# Patient Record
Sex: MALE | Race: WHITE | NOT HISPANIC OR LATINO | Employment: OTHER | URBAN - METROPOLITAN AREA
[De-identification: names, ages, dates, MRNs, and addresses within clinical notes are randomized per-mention and may not be internally consistent; named-entity substitution may affect disease eponyms.]

---

## 2017-08-18 ENCOUNTER — HOSPITAL ENCOUNTER (OUTPATIENT)
Dept: CARDIOLOGY | Facility: HOSPITAL | Age: 57
Discharge: HOME | End: 2017-08-18
Attending: PODIATRIST | Admitting: PODIATRIST

## 2019-07-08 PROBLEM — R07.9 CHEST PAIN: Status: ACTIVE | Noted: 2017-02-20

## 2019-07-08 PROBLEM — F31.9 BIPOLAR AFFECTIVE DISORDER (HCC): Status: ACTIVE | Noted: 2019-07-08

## 2019-07-08 RX ORDER — ROPINIROLE 0.5 MG/1
TABLET, FILM COATED ORAL
Status: ON HOLD | COMMUNITY
Start: 2015-09-23 | End: 2020-07-14

## 2019-07-08 RX ORDER — LORATADINE 10 MG/1
TABLET ORAL
Status: ON HOLD | COMMUNITY
Start: 2015-09-23 | End: 2020-07-14

## 2019-07-08 RX ORDER — ESCITALOPRAM OXALATE 10 MG/1
TABLET ORAL
Status: ON HOLD | COMMUNITY
Start: 2017-02-20 | End: 2020-07-14

## 2019-07-08 RX ORDER — HYDROXYZINE HYDROCHLORIDE 25 MG/1
TABLET, FILM COATED ORAL
Status: ON HOLD | COMMUNITY
Start: 2016-12-01 | End: 2020-07-14

## 2019-07-08 RX ORDER — PREGABALIN 50 MG/1
150 CAPSULE ORAL 3 TIMES DAILY
COMMUNITY
Start: 2016-05-04 | End: 2021-03-01 | Stop reason: SDUPTHER

## 2019-07-08 RX ORDER — GABAPENTIN 600 MG/1
TABLET ORAL
Status: ON HOLD | COMMUNITY
Start: 2015-07-15 | End: 2020-07-14

## 2019-07-08 RX ORDER — CLOPIDOGREL BISULFATE 75 MG/1
75 TABLET ORAL DAILY
COMMUNITY
Start: 2015-09-23

## 2019-07-08 RX ORDER — PANTOPRAZOLE SODIUM 40 MG/1
40 TABLET, DELAYED RELEASE ORAL DAILY
COMMUNITY
Start: 2017-02-20

## 2019-07-08 RX ORDER — ZOLPIDEM TARTRATE 10 MG/1
TABLET ORAL
Status: ON HOLD | COMMUNITY
Start: 2016-12-01 | End: 2020-07-14

## 2019-07-08 RX ORDER — HYDROCODONE BITARTRATE AND ACETAMINOPHEN 10; 325 MG/1; MG/1
1 TABLET ORAL EVERY 6 HOURS PRN
Status: ON HOLD | COMMUNITY
Start: 2016-05-04 | End: 2020-07-17 | Stop reason: SDUPTHER

## 2019-07-08 RX ORDER — CLONIDINE HYDROCHLORIDE 0.2 MG/1
0.1 TABLET ORAL 3 TIMES DAILY PRN
Status: ON HOLD | COMMUNITY
Start: 2015-07-15 | End: 2022-01-28

## 2019-07-08 RX ORDER — ASPIRIN 81 MG/1
TABLET ORAL
Status: ON HOLD | COMMUNITY
Start: 2017-07-19 | End: 2020-07-14

## 2019-07-08 RX ORDER — ATORVASTATIN CALCIUM 80 MG/1
80 TABLET, FILM COATED ORAL NIGHTLY
COMMUNITY
Start: 2017-07-19

## 2019-07-08 RX ORDER — LISINOPRIL 10 MG/1
TABLET ORAL
Status: ON HOLD | COMMUNITY
Start: 2015-07-15 | End: 2020-07-14

## 2019-07-08 RX ORDER — NITROGLYCERIN 400 UG/1
SPRAY ORAL
Status: ON HOLD | COMMUNITY
Start: 2018-03-14 | End: 2022-01-28

## 2019-07-08 RX ORDER — FENOFIBRATE 145 MG/1
145 TABLET, COATED ORAL DAILY
COMMUNITY
Start: 2015-09-23

## 2019-07-08 RX ORDER — PENTOXIFYLLINE 400 MG/1
TABLET, EXTENDED RELEASE ORAL
Status: ON HOLD | COMMUNITY
Start: 2018-03-14 | End: 2020-07-14

## 2019-07-08 RX ORDER — LANOLIN ALCOHOL/MO/W.PET/CERES
CREAM (GRAM) TOPICAL
Status: ON HOLD | COMMUNITY
Start: 2015-07-15 | End: 2020-07-14

## 2019-07-08 RX ORDER — OXCARBAZEPINE 300 MG/1
150 TABLET, FILM COATED ORAL NIGHTLY
COMMUNITY
Start: 2015-09-23

## 2019-07-08 RX ORDER — DOCUSATE SODIUM 100 MG/1
100 CAPSULE, LIQUID FILLED ORAL 2 TIMES DAILY
Status: ON HOLD | COMMUNITY
Start: 2015-09-23 | End: 2022-01-28

## 2019-07-08 RX ORDER — GUAIFENESIN/DEXTROMETHORPHAN 100-10MG/5
SYRUP ORAL
Status: ON HOLD | COMMUNITY
Start: 2016-12-01 | End: 2020-07-14

## 2019-07-08 RX ORDER — CYCLOBENZAPRINE HCL 10 MG
TABLET ORAL
Status: ON HOLD | COMMUNITY
Start: 2016-12-01 | End: 2020-07-14

## 2019-07-08 RX ORDER — LEVOCETIRIZINE DIHYDROCHLORIDE 5 MG/1
TABLET, FILM COATED ORAL
Status: ON HOLD | COMMUNITY
Start: 2018-03-14 | End: 2020-07-14

## 2019-07-31 ENCOUNTER — OFFICE VISIT (OUTPATIENT)
Dept: CARDIOLOGY | Facility: CLINIC | Age: 59
End: 2019-07-31

## 2019-07-31 VITALS
HEART RATE: 53 BPM | BODY MASS INDEX: 28.09 KG/M2 | HEIGHT: 67 IN | OXYGEN SATURATION: 90 % | SYSTOLIC BLOOD PRESSURE: 107 MMHG | DIASTOLIC BLOOD PRESSURE: 67 MMHG | WEIGHT: 179 LBS

## 2019-07-31 DIAGNOSIS — I10 ESSENTIAL HYPERTENSION: ICD-10-CM

## 2019-07-31 DIAGNOSIS — E78.00 PURE HYPERCHOLESTEROLEMIA: ICD-10-CM

## 2019-07-31 DIAGNOSIS — E11.9 TYPE 2 DIABETES MELLITUS WITHOUT COMPLICATION, WITHOUT LONG-TERM CURRENT USE OF INSULIN (HCC): ICD-10-CM

## 2019-07-31 DIAGNOSIS — I25.118 CORONARY ARTERY DISEASE OF NATIVE ARTERY OF NATIVE HEART WITH STABLE ANGINA PECTORIS (HCC): Primary | ICD-10-CM

## 2019-07-31 DIAGNOSIS — I73.9 PERIPHERAL ARTERIAL DISEASE (HCC): ICD-10-CM

## 2019-07-31 PROCEDURE — 93000 ELECTROCARDIOGRAM COMPLETE: CPT | Performed by: INTERNAL MEDICINE

## 2019-07-31 PROCEDURE — 99214 OFFICE O/P EST MOD 30 MIN: CPT | Performed by: INTERNAL MEDICINE

## 2019-07-31 RX ORDER — METOPROLOL SUCCINATE 25 MG/1
25 TABLET, EXTENDED RELEASE ORAL DAILY
COMMUNITY

## 2019-07-31 RX ORDER — RISPERIDONE 0.25 MG/1
0.25 TABLET ORAL 2 TIMES DAILY
COMMUNITY

## 2019-07-31 RX ORDER — AMLODIPINE BESYLATE 5 MG/1
5 TABLET ORAL DAILY
COMMUNITY

## 2019-07-31 RX ORDER — DULOXETIN HYDROCHLORIDE 20 MG/1
20 CAPSULE, DELAYED RELEASE ORAL DAILY
COMMUNITY
End: 2022-01-29 | Stop reason: HOSPADM

## 2019-07-31 RX ORDER — TRAZODONE HYDROCHLORIDE 50 MG/1
100 TABLET ORAL NIGHTLY
COMMUNITY

## 2019-07-31 RX ORDER — LOPERAMIDE HYDROCHLORIDE 2 MG/1
2 CAPSULE ORAL EVERY 4 HOURS PRN
COMMUNITY

## 2019-07-31 RX ORDER — PREDNISONE 1 MG/1
5 TABLET ORAL DAILY
COMMUNITY
End: 2021-03-01

## 2019-07-31 RX ORDER — SODIUM CHLORIDE 1000 MG
1 TABLET, SOLUBLE MISCELLANEOUS 3 TIMES DAILY
COMMUNITY

## 2019-07-31 NOTE — PROGRESS NOTES
"    Subjective:     Encounter Date:07/31/2019      Patient ID: Emery Nesbitt is a 59 y.o. male.    Chief Complaint:  History of Present Illness 59-year-old white male with history of coronary artery disease peripheral artery disease diabetes hypertension hyperlipidemia presents to the office for follow-up.  Patient is currently stable without any symptoms of chest pain or shortness of breath at rest or exertion.  He uses a wheelchair most of the time.  No complaints of any PND orthopnea.  No palpitations dizziness syncope or swelling of the feet.  He lives in a nursing home and takes his medicines regularly.  He is on a strict cardiac diet.  He does not smoke.    The following portions of the patient's history were reviewed and updated as appropriate: allergies, current medications, past family history, past medical history, past social history, past surgical history and problem list.  Past Medical History:   Diagnosis Date   • Coronary artery disease    • Hyperlipidemia    • Hypertension      History reviewed. No pertinent surgical history.  /67   Pulse 53   Ht 170.2 cm (67\")   Wt 81.2 kg (179 lb)   SpO2 90%   BMI 28.04 kg/m²   Family History   Adopted: Yes       Current Outpatient Medications:   •  amLODIPine (NORVASC) 5 MG tablet, Take 5 mg by mouth Daily., Disp: , Rfl:   •  atorvastatin (LIPITOR) 80 MG tablet, ATORVASTATIN CALCIUM 80 MG TABS, Disp: , Rfl:   •  Chlorpheniramine-Acetaminophen (CORICIDIN) 2-325 MG tablet, Take  by mouth., Disp: , Rfl:   •  CloNIDine (CATAPRES) 0.2 MG tablet, CATAPRES 0.2 MG TABS, Disp: , Rfl:   •  clopidogrel (PLAVIX) 75 MG tablet, PLAVIX 75 MG TABS, Disp: , Rfl:   •  docusate sodium (COLACE) 100 MG capsule, COLACE 100 MG CAPS, Disp: , Rfl:   •  DULoxetine (CYMBALTA) 20 MG capsule, Take 20 mg by mouth Daily., Disp: , Rfl:   •  fenofibrate (TRICOR) 145 MG tablet, TRICOR 145 MG TABS, Disp: , Rfl:   •  HYDROcodone-acetaminophen (NORCO)  MG per tablet, NORCO  MG " TABS, Disp: , Rfl:   •  loperamide (IMODIUM) 2 MG capsule, Take 2 mg by mouth 4 (Four) Times a Day As Needed for Diarrhea., Disp: , Rfl:   •  metFORMIN (GLUCOPHAGE) 500 MG tablet, METFORMIN  MG TABS, Disp: , Rfl:   •  metoprolol succinate XL (TOPROL-XL) 25 MG 24 hr tablet, Take 25 mg by mouth Daily., Disp: , Rfl:   •  nitroglycerin (NITROLINGUAL) 0.4 MG/SPRAY spray, NITROLINGUAL 0.4 MG/SPRAY SOLN, Disp: , Rfl:   •  OXcarbazepine (TRILEPTAL) 300 MG tablet, OXCARBAZEPINE 300 MG TABS, Disp: , Rfl:   •  pantoprazole (PROTONIX) 40 MG EC tablet, PANTOPRAZOLE SODIUM 40 MG TBEC, Disp: , Rfl:   •  predniSONE (DELTASONE) 5 MG tablet, Take 5 mg by mouth Daily., Disp: , Rfl:   •  pregabalin (LYRICA) 50 MG capsule, LYRICA 50 MG CAPS, Disp: , Rfl:   •  risperiDONE (risperDAL) 0.5 MG tablet, Take 0.5 mg by mouth 2 (Two) Times a Day., Disp: , Rfl:   •  sodium chloride 1 g tablet, Take 1 g by mouth 3 (Three) Times a Day., Disp: , Rfl:   •  traZODone (DESYREL) 50 MG tablet, Take 100 mg by mouth Every Night., Disp: , Rfl:   •  aspirin (ASPIR-LOW) 81 MG EC tablet, ASPIR-LOW 81 MG TBEC, Disp: , Rfl:   •  cyclobenzaprine (FLEXERIL) 10 MG tablet, CYCLOBENZAPRINE HCL 10 MG TABS, Disp: , Rfl:   •  escitalopram (LEXAPRO) 10 MG tablet, ESCITALOPRAM OXALATE 10 MG TABS, Disp: , Rfl:   •  ferrous sulfate (FE TABS) 325 (65 FE) MG EC tablet, FE TABS 325 (65 Fe) MG TBEC, Disp: , Rfl:   •  gabapentin (NEURONTIN) 600 MG tablet, GABAPENTIN 600 MG TABS, Disp: , Rfl:   •  guaifenesin-dextromethorphan (ROBITUSSIN DM) 100-10 MG/5ML syrup, GUAIFENESIN--10 MG/5ML SYRP, Disp: , Rfl:   •  hydrOXYzine (ATARAX) 25 MG tablet, HYDROXYZINE HCL 25 MG TABS, Disp: , Rfl:   •  levocetirizine (XYZAL) 5 MG tablet, XYZAL 5 MG ORAL TABLET, Disp: , Rfl:   •  lisinopril (PRINIVIL,ZESTRIL) 10 MG tablet, LISINOPRIL 10 MG TABS, Disp: , Rfl:   •  loratadine (CLARITIN) 10 MG tablet, CLARITIN 10 MG TABS, Disp: , Rfl:   •  magnesium oxide (MAGNESIUM-OXIDE) 400 (241.3  Mg) MG tablet tablet, MAGNESIUM-OXIDE 400 (241.3 Mg) MG TABS, Disp: , Rfl:   •  pentoxifylline (TRENtal) 400 MG CR tablet, PENTOXIFYLLINE  MG CR-TABS, Disp: , Rfl:   •  Polyethylene Glycol 3350 (MIRALAX PO), MIRALAX POWD, Disp: , Rfl:   •  rOPINIRole (REQUIP) 0.5 MG tablet, REQUIP 0.5 MG TABS, Disp: , Rfl:   •  zolpidem (AMBIEN) 10 MG tablet, AMBIEN 10 MG TABS, Disp: , Rfl:   No Known Allergies  Social History     Socioeconomic History   • Marital status: Single     Spouse name: Not on file   • Number of children: Not on file   • Years of education: Not on file   • Highest education level: Not on file   Tobacco Use   • Smoking status: Former Smoker     Review of Systems   Constitution: Negative for fever and malaise/fatigue.   HENT: Negative for ear pain and nosebleeds.    Eyes: Negative for blurred vision and double vision.   Cardiovascular: Negative for chest pain, dyspnea on exertion, leg swelling and palpitations.   Respiratory: Negative for cough and shortness of breath.    Skin: Negative for rash.   Musculoskeletal: Negative for joint pain.   Gastrointestinal: Negative for abdominal pain, nausea and vomiting.   Neurological: Negative for focal weakness, headaches, light-headedness and numbness.   Psychiatric/Behavioral: Negative for depression. The patient is not nervous/anxious.    All other systems reviewed and are negative.             Objective:     Physical Exam   Constitutional: He appears well-developed and well-nourished.   HENT:   Head: Normocephalic and atraumatic.   Eyes: Conjunctivae and EOM are normal. Pupils are equal, round, and reactive to light. No scleral icterus.   Neck: Normal range of motion. Neck supple. No JVD present. Carotid bruit is not present.   Cardiovascular: Normal rate, regular rhythm, S1 normal, S2 normal, normal heart sounds and intact distal pulses. PMI is not displaced.   Pulmonary/Chest: Effort normal and breath sounds normal. He has no wheezes. He has no rales.    Abdominal: Soft. Bowel sounds are normal.   Musculoskeletal: Normal range of motion.   Neurological: He is alert. He has normal strength.   No focal deficits   Skin: Skin is warm and dry. No rash noted.   Psychiatric: He has a normal mood and affect.       ECG 12 Lead  Date/Time: 7/31/2019 10:17 AM  Performed by: Sharath Conklin MD  Authorized by: Sharath Conklin MD   Comments: Sinus rhythm with nonspecific ST segment normality  No previous EKGs to compare with            Lab Review:       Assessment:          Diagnosis Plan   1. Coronary artery disease of native artery of native heart with stable angina pectoris (CMS/[facility])     2. Essential hypertension     3. Pure hypercholesterolemia     4. Type 2 diabetes mellitus without complication, without long-term current use of insulin (CMS/[facility])     5. Peripheral arterial disease (CMS/[facility])            Plan:       Patient has nonobstructive arteries now but had a stent placement the past and is currently stable on medications per  Patient's sugar levels and lipid levels are followed by the primary care doctor.  Patient's blood pressure and heart rate are stable now.  Patient has significant diabetic neuropathy and is followed by a endocrinologist.  Patient has peripheral vascular disease and used to have significant claudication but is not ambulating very well at this time.  He is followed by vascular surgeon.  I will continue him on current medicines and follow in 6 months.

## 2020-05-14 ENCOUNTER — OFFICE VISIT (OUTPATIENT)
Dept: CARDIOLOGY | Facility: CLINIC | Age: 60
End: 2020-05-14

## 2020-05-14 VITALS — DIASTOLIC BLOOD PRESSURE: 68 MMHG | HEART RATE: 76 BPM | OXYGEN SATURATION: 96 % | SYSTOLIC BLOOD PRESSURE: 116 MMHG

## 2020-05-14 DIAGNOSIS — I25.118 CORONARY ARTERY DISEASE OF NATIVE ARTERY OF NATIVE HEART WITH STABLE ANGINA PECTORIS (HCC): ICD-10-CM

## 2020-05-14 DIAGNOSIS — I10 ESSENTIAL HYPERTENSION: ICD-10-CM

## 2020-05-14 DIAGNOSIS — E78.00 PURE HYPERCHOLESTEROLEMIA: ICD-10-CM

## 2020-05-14 DIAGNOSIS — I73.9 PERIPHERAL VASCULAR DISEASE (HCC): Primary | ICD-10-CM

## 2020-05-14 DIAGNOSIS — E11.9 TYPE 2 DIABETES MELLITUS WITHOUT COMPLICATION, WITHOUT LONG-TERM CURRENT USE OF INSULIN (HCC): ICD-10-CM

## 2020-05-14 PROCEDURE — 99214 OFFICE O/P EST MOD 30 MIN: CPT | Performed by: INTERNAL MEDICINE

## 2020-05-14 RX ORDER — HYDRALAZINE HYDROCHLORIDE 50 MG/1
50 TABLET, FILM COATED ORAL 3 TIMES DAILY
COMMUNITY
Start: 2020-05-04

## 2020-05-14 RX ORDER — ACETAMINOPHEN 325 MG/1
650 TABLET ORAL EVERY 6 HOURS PRN
COMMUNITY
Start: 2020-05-05 | End: 2021-03-01

## 2020-05-14 NOTE — PROGRESS NOTES
Subjective:     Encounter Date:2020      Patient ID: Emery Nesbitt is a 60 y.o. male.    Chief Complaint:  History of Present Illness 60-year-old white male with history of coronary artery disease history of peripheral vascular disease history of diabetes hypertension hyperlipidemia presents to my office for follow-up.  Patient is currently stable without evidence of chest pain or shortness of breath at rest on exertion.  No complains of any PND orthopnea.  No palpitation dizziness syncope or swelling of the feet.  He does not walk very well because of claudication and uses wheelchair.  He follows a good diet.  He is currently at a nursing home facility.  He is taking his medicines regularly.      .  /68   Pulse 76   SpO2 96%     The following portions of the patient's history were reviewed and updated as appropriate: allergies, current medications, past family history, past medical history, past social history, past surgical history and problem list.  Past Medical History:   Diagnosis Date   • Coronary artery disease    • Diabetes mellitus (CMS/HCC)    • Hyperlipidemia    • Hypertension    • Peripheral vascular disease (CMS/[facility])      Past Surgical History:   Procedure Laterality Date   • CARDIAC CATHETERIZATION     • CORONARY ANGIOPLASTY       Social History     Socioeconomic History   • Marital status: Single     Spouse name: Not on file   • Number of children: Not on file   • Years of education: Not on file   • Highest education level: Not on file   Tobacco Use   • Smoking status: Former Smoker     Last attempt to quit: 2016     Years since quittin.3   • Smokeless tobacco: Never Used   Substance and Sexual Activity   • Alcohol use: Never     Frequency: Never   • Drug use: Never     Family History   Adopted: Yes       Current Outpatient Medications:   •  amLODIPine (NORVASC) 5 MG tablet, Take 5 mg by mouth Daily., Disp: , Rfl:   •  atorvastatin (LIPITOR) 80 MG tablet, ATORVASTATIN CALCIUM 80  MG TABS, Disp: , Rfl:   •  CloNIDine (CATAPRES) 0.2 MG tablet, Take 0.1 mg by mouth 3 (Three) Times a Day As Needed., Disp: , Rfl:   •  clopidogrel (PLAVIX) 75 MG tablet, PLAVIX 75 MG TABS, Disp: , Rfl:   •  docusate sodium (COLACE) 100 MG capsule, COLACE 100 MG CAPS, Disp: , Rfl:   •  DULoxetine (CYMBALTA) 20 MG capsule, Take 20 mg by mouth Daily., Disp: , Rfl:   •  fenofibrate (TRICOR) 145 MG tablet, TRICOR 145 MG TABS, Disp: , Rfl:   •  HYDROcodone-acetaminophen (NORCO)  MG per tablet, NORCO  MG TABS, Disp: , Rfl:   •  levocetirizine (XYZAL) 5 MG tablet, XYZAL 5 MG ORAL TABLET, Disp: , Rfl:   •  magnesium oxide (MAGNESIUM-OXIDE) 400 (241.3 Mg) MG tablet tablet, MAGNESIUM-OXIDE 400 (241.3 Mg) MG TABS, Disp: , Rfl:   •  metFORMIN (GLUCOPHAGE) 500 MG tablet, METFORMIN  MG TABS, Disp: , Rfl:   •  metoprolol succinate XL (TOPROL-XL) 25 MG 24 hr tablet, Take 25 mg by mouth Daily., Disp: , Rfl:   •  nitroglycerin (NITROLINGUAL) 0.4 MG/SPRAY spray, NITROLINGUAL 0.4 MG/SPRAY SOLN, Disp: , Rfl:   •  OXcarbazepine (TRILEPTAL) 300 MG tablet, OXCARBAZEPINE 300 MG TABS, Disp: , Rfl:   •  pantoprazole (PROTONIX) 40 MG EC tablet, PANTOPRAZOLE SODIUM 40 MG TBEC, Disp: , Rfl:   •  Polyethylene Glycol 3350 (MIRALAX PO), MIRALAX POWD, Disp: , Rfl:   •  predniSONE (DELTASONE) 5 MG tablet, Take 5 mg by mouth Daily., Disp: , Rfl:   •  pregabalin (LYRICA) 50 MG capsule, 3 (Three) Times a Day., Disp: , Rfl:   •  risperiDONE (risperDAL) 0.5 MG tablet, Take 0.5 mg by mouth 2 (Two) Times a Day., Disp: , Rfl:   •  traZODone (DESYREL) 50 MG tablet, Take 100 mg by mouth Every Night., Disp: , Rfl:   •  acetaminophen (TYLENOL) 325 MG tablet, , Disp: , Rfl:   •  aspirin (ASPIR-LOW) 81 MG EC tablet, ASPIR-LOW 81 MG TBEC, Disp: , Rfl:   •  Chlorpheniramine-Acetaminophen (CORICIDIN) 2-325 MG tablet, Take  by mouth., Disp: , Rfl:   •  cyclobenzaprine (FLEXERIL) 10 MG tablet, CYCLOBENZAPRINE HCL 10 MG TABS, Disp: , Rfl:   •   escitalopram (LEXAPRO) 10 MG tablet, ESCITALOPRAM OXALATE 10 MG TABS, Disp: , Rfl:   •  ferrous sulfate (FE TABS) 325 (65 FE) MG EC tablet, FE TABS 325 (65 Fe) MG TBEC, Disp: , Rfl:   •  gabapentin (NEURONTIN) 600 MG tablet, GABAPENTIN 600 MG TABS, Disp: , Rfl:   •  guaifenesin-dextromethorphan (ROBITUSSIN DM) 100-10 MG/5ML syrup, GUAIFENESIN--10 MG/5ML SYRP, Disp: , Rfl:   •  hydrALAZINE (APRESOLINE) 50 MG tablet, 3 (Three) Times a Day., Disp: , Rfl:   •  hydrOXYzine (ATARAX) 25 MG tablet, HYDROXYZINE HCL 25 MG TABS, Disp: , Rfl:   •  lisinopril (PRINIVIL,ZESTRIL) 10 MG tablet, LISINOPRIL 10 MG TABS, Disp: , Rfl:   •  loperamide (IMODIUM) 2 MG capsule, Take 2 mg by mouth 4 (Four) Times a Day As Needed for Diarrhea., Disp: , Rfl:   •  loratadine (CLARITIN) 10 MG tablet, CLARITIN 10 MG TABS, Disp: , Rfl:   •  pentoxifylline (TRENtal) 400 MG CR tablet, PENTOXIFYLLINE  MG CR-TABS, Disp: , Rfl:   •  rOPINIRole (REQUIP) 0.5 MG tablet, REQUIP 0.5 MG TABS, Disp: , Rfl:   •  sodium chloride 1 g tablet, Take 1 g by mouth 3 (Three) Times a Day., Disp: , Rfl:   •  zolpidem (AMBIEN) 10 MG tablet, AMBIEN 10 MG TABS, Disp: , Rfl:   No Known Allergies    Review of Systems   Constitution: Negative for fever and malaise/fatigue.   HENT: Negative for congestion and hearing loss.    Eyes: Negative for double vision and visual disturbance.   Cardiovascular: Negative for chest pain, claudication, dyspnea on exertion, leg swelling, palpitations and syncope.   Respiratory: Negative for cough and shortness of breath.    Endocrine: Negative for cold intolerance.   Skin: Negative for color change and rash.   Musculoskeletal: Negative for arthritis and joint pain.   Gastrointestinal: Negative for abdominal pain and heartburn.   Genitourinary: Negative for hematuria.   Neurological: Negative for excessive daytime sleepiness and dizziness.   Psychiatric/Behavioral: Negative for depression. The patient is not nervous/anxious.    All  other systems reviewed and are negative.             Objective:     Physical Exam   Constitutional: He appears well-developed and well-nourished.   HENT:   Head: Normocephalic and atraumatic.   Eyes: Pupils are equal, round, and reactive to light. Conjunctivae and EOM are normal. No scleral icterus.   Neck: Normal range of motion. Neck supple. No JVD present. Carotid bruit is not present.   Cardiovascular: Normal rate, regular rhythm, S1 normal, S2 normal and intact distal pulses. PMI is not displaced.   Murmur heard.  Pulmonary/Chest: Effort normal and breath sounds normal. He has no wheezes. He has no rales.   Abdominal: Soft. Bowel sounds are normal.   Musculoskeletal: Normal range of motion.   Neurological: He is alert. He has normal strength.   No focal deficits   Skin: Skin is warm and dry. No rash noted.   Psychiatric: He has a normal mood and affect.       Procedures    Lab Review:       Assessment:          Diagnosis Plan   1. Peripheral vascular disease (CMS/[facility])     2. Essential hypertension     3. Pure hypercholesterolemia     4. Coronary artery disease of native artery of native heart with stable angina pectoris (CMS/[facility])     5. Type 2 diabetes mellitus without complication, without long-term current use of insulin (CMS/[facility])            Plan:     Denies history of coronary disease and is currently stable on medical therapy without any symptoms  Patient has history of peripheral vascular disease and has claudication and does not ambulate very well  Patient blood pressure and heart rate are stable  Patient's lipid levels are followed by the primary care doctor  Patient has diabetes and is on medical therapy  Continue current medicines and follow him in 6 months

## 2020-07-14 ENCOUNTER — APPOINTMENT (OUTPATIENT)
Dept: CARDIOLOGY | Facility: HOSPITAL | Age: 60
End: 2020-07-14

## 2020-07-14 ENCOUNTER — APPOINTMENT (OUTPATIENT)
Dept: CT IMAGING | Facility: HOSPITAL | Age: 60
End: 2020-07-14

## 2020-07-14 ENCOUNTER — HOSPITAL ENCOUNTER (INPATIENT)
Facility: HOSPITAL | Age: 60
LOS: 3 days | Discharge: NURSING, REHAB OR LONG-TERM CARE | End: 2020-07-17
Attending: INTERNAL MEDICINE | Admitting: PODIATRIST

## 2020-07-14 ENCOUNTER — APPOINTMENT (OUTPATIENT)
Dept: VASCULAR SURGERY | Facility: HOSPITAL | Age: 60
End: 2020-07-14

## 2020-07-14 ENCOUNTER — APPOINTMENT (OUTPATIENT)
Dept: GENERAL RADIOLOGY | Facility: HOSPITAL | Age: 60
End: 2020-07-14

## 2020-07-14 ENCOUNTER — APPOINTMENT (OUTPATIENT)
Dept: MRI IMAGING | Facility: HOSPITAL | Age: 60
End: 2020-07-14

## 2020-07-14 DIAGNOSIS — E11.621 DIABETIC ULCER OF TOE OF RIGHT FOOT ASSOCIATED WITH TYPE 2 DIABETES MELLITUS, WITH BONE INVOLVEMENT WITHOUT EVIDENCE OF NECROSIS (HCC): ICD-10-CM

## 2020-07-14 DIAGNOSIS — I73.9 PERIPHERAL VASCULAR DISEASE (HCC): Primary | ICD-10-CM

## 2020-07-14 DIAGNOSIS — L97.516 DIABETIC ULCER OF TOE OF RIGHT FOOT ASSOCIATED WITH TYPE 2 DIABETES MELLITUS, WITH BONE INVOLVEMENT WITHOUT EVIDENCE OF NECROSIS (HCC): ICD-10-CM

## 2020-07-14 PROBLEM — F31.9 BIPOLAR AFFECTIVE DISORDER (HCC): Chronic | Status: ACTIVE | Noted: 2019-07-08

## 2020-07-14 PROBLEM — G25.81 RLS (RESTLESS LEGS SYNDROME): Chronic | Status: ACTIVE | Noted: 2020-07-14

## 2020-07-14 PROBLEM — Z86.73 HISTORY OF CVA (CEREBROVASCULAR ACCIDENT): Chronic | Status: ACTIVE | Noted: 2020-07-14

## 2020-07-14 PROBLEM — E83.42 HYPOMAGNESEMIA: Chronic | Status: ACTIVE | Noted: 2020-07-14

## 2020-07-14 PROBLEM — G62.9 PERIPHERAL NEUROPATHY: Chronic | Status: ACTIVE | Noted: 2020-07-14

## 2020-07-14 PROBLEM — E87.1 HYPONATREMIA: Chronic | Status: ACTIVE | Noted: 2020-07-14

## 2020-07-14 PROBLEM — F41.9 ANXIETY DISORDER: Chronic | Status: ACTIVE | Noted: 2020-07-14

## 2020-07-14 PROBLEM — J30.2 SEASONAL ALLERGIES: Chronic | Status: ACTIVE | Noted: 2020-07-14

## 2020-07-14 PROBLEM — L03.031 CELLULITIS OF RIGHT TOE: Status: ACTIVE | Noted: 2020-07-14

## 2020-07-14 LAB
ALBUMIN SERPL-MCNC: 4 G/DL (ref 3.5–5.2)
ALBUMIN/GLOB SERPL: 2.1 G/DL
ALP SERPL-CCNC: 43 U/L (ref 39–117)
ALT SERPL W P-5'-P-CCNC: 21 U/L (ref 1–41)
ANION GAP SERPL CALCULATED.3IONS-SCNC: 10 MMOL/L (ref 5–15)
AST SERPL-CCNC: 19 U/L (ref 1–40)
BASOPHILS # BLD AUTO: 0 10*3/MM3 (ref 0–0.2)
BASOPHILS NFR BLD AUTO: 0.5 % (ref 0–1.5)
BH CV LOWER ARTERIAL LEFT ABI RATIO: 0.63
BH CV LOWER ARTERIAL LEFT DORSALIS PEDIS SYS MAX: 87 MMHG
BH CV LOWER ARTERIAL LEFT GREAT TOE SYS MAX: 75 MMHG
BH CV LOWER ARTERIAL LEFT POST TIBIAL SYS MAX: 95 MMHG
BH CV LOWER ARTERIAL LEFT TBI RATIO: 0.5
BH CV LOWER ARTERIAL RIGHT ABI RATIO: 0.73
BH CV LOWER ARTERIAL RIGHT DORSALIS PEDIS SYS MAX: 76 MMHG
BH CV LOWER ARTERIAL RIGHT GREAT TOE SYS MAX: 71 MMHG
BH CV LOWER ARTERIAL RIGHT POST TIBIAL SYS MAX: 110 MMHG
BH CV LOWER ARTERIAL RIGHT TBI RATIO: 0.47
BILIRUB SERPL-MCNC: 0.2 MG/DL (ref 0–1.2)
BUN SERPL-MCNC: 16 MG/DL (ref 8–23)
BUN SERPL-MCNC: ABNORMAL MG/DL
BUN/CREAT SERPL: ABNORMAL
CALCIUM SPEC-SCNC: 9.2 MG/DL (ref 8.6–10.5)
CHLORIDE SERPL-SCNC: 95 MMOL/L (ref 98–107)
CO2 SERPL-SCNC: 26 MMOL/L (ref 22–29)
CREAT SERPL-MCNC: 0.73 MG/DL (ref 0.76–1.27)
CRP SERPL-MCNC: 0.69 MG/DL (ref 0–0.5)
D-LACTATE SERPL-SCNC: 0.7 MMOL/L (ref 0.5–2)
DEPRECATED RDW RBC AUTO: 50.3 FL (ref 37–54)
EOSINOPHIL # BLD AUTO: 0.1 10*3/MM3 (ref 0–0.4)
EOSINOPHIL NFR BLD AUTO: 1.2 % (ref 0.3–6.2)
ERYTHROCYTE [DISTWIDTH] IN BLOOD BY AUTOMATED COUNT: 16.7 % (ref 12.3–15.4)
ERYTHROCYTE [SEDIMENTATION RATE] IN BLOOD: 10 MM/HR (ref 0–20)
GFR SERPL CREATININE-BSD FRML MDRD: 109 ML/MIN/1.73
GLOBULIN UR ELPH-MCNC: 1.9 GM/DL
GLUCOSE SERPL-MCNC: 80 MG/DL (ref 65–99)
HBA1C MFR BLD: 5.4 % (ref 3.5–5.6)
HCT VFR BLD AUTO: 31.8 % (ref 37.5–51)
HGB BLD-MCNC: 11 G/DL (ref 13–17.7)
LYMPHOCYTES # BLD AUTO: 1.6 10*3/MM3 (ref 0.7–3.1)
LYMPHOCYTES NFR BLD AUTO: 22 % (ref 19.6–45.3)
MAGNESIUM SERPL-MCNC: 1.2 MG/DL (ref 1.6–2.4)
MCH RBC QN AUTO: 29.6 PG (ref 26.6–33)
MCHC RBC AUTO-ENTMCNC: 34.7 G/DL (ref 31.5–35.7)
MCV RBC AUTO: 85.3 FL (ref 79–97)
MONOCYTES # BLD AUTO: 0.8 10*3/MM3 (ref 0.1–0.9)
MONOCYTES NFR BLD AUTO: 10.6 % (ref 5–12)
NEUTROPHILS NFR BLD AUTO: 4.9 10*3/MM3 (ref 1.7–7)
NEUTROPHILS NFR BLD AUTO: 65.7 % (ref 42.7–76)
NRBC BLD AUTO-RTO: 0 /100 WBC (ref 0–0.2)
PLATELET # BLD AUTO: 157 10*3/MM3 (ref 140–450)
PMV BLD AUTO: 9.2 FL (ref 6–12)
POTASSIUM SERPL-SCNC: 4 MMOL/L (ref 3.5–5.2)
PROT SERPL-MCNC: 5.9 G/DL (ref 6–8.5)
RBC # BLD AUTO: 3.72 10*6/MM3 (ref 4.14–5.8)
SODIUM SERPL-SCNC: 131 MMOL/L (ref 136–145)
UPPER ARTERIAL LEFT ARM BRACHIAL SYS MAX: 150 MMHG
UPPER ARTERIAL RIGHT ARM BRACHIAL SYS MAX: 140 MMHG
WBC # BLD AUTO: 7.5 10*3/MM3 (ref 3.4–10.8)

## 2020-07-14 PROCEDURE — G0463 HOSPITAL OUTPT CLINIC VISIT: HCPCS

## 2020-07-14 PROCEDURE — 83735 ASSAY OF MAGNESIUM: CPT | Performed by: NURSE PRACTITIONER

## 2020-07-14 PROCEDURE — A9579 GAD-BASE MR CONTRAST NOS,1ML: HCPCS | Performed by: INTERNAL MEDICINE

## 2020-07-14 PROCEDURE — 0 IOPAMIDOL PER 1 ML: Performed by: INTERNAL MEDICINE

## 2020-07-14 PROCEDURE — 75635 CT ANGIO ABDOMINAL ARTERIES: CPT

## 2020-07-14 PROCEDURE — 86140 C-REACTIVE PROTEIN: CPT | Performed by: INTERNAL MEDICINE

## 2020-07-14 PROCEDURE — 25010000002 VANCOMYCIN 10 G RECONSTITUTED SOLUTION: Performed by: NURSE PRACTITIONER

## 2020-07-14 PROCEDURE — 83605 ASSAY OF LACTIC ACID: CPT | Performed by: INTERNAL MEDICINE

## 2020-07-14 PROCEDURE — 93005 ELECTROCARDIOGRAM TRACING: CPT | Performed by: INTERNAL MEDICINE

## 2020-07-14 PROCEDURE — 80053 COMPREHEN METABOLIC PANEL: CPT | Performed by: INTERNAL MEDICINE

## 2020-07-14 PROCEDURE — 25010000002 GADOTERIDOL PER 1 ML: Performed by: INTERNAL MEDICINE

## 2020-07-14 PROCEDURE — 99223 1ST HOSP IP/OBS HIGH 75: CPT | Performed by: INTERNAL MEDICINE

## 2020-07-14 PROCEDURE — 83036 HEMOGLOBIN GLYCOSYLATED A1C: CPT | Performed by: NURSE PRACTITIONER

## 2020-07-14 PROCEDURE — 87205 SMEAR GRAM STAIN: CPT | Performed by: NURSE PRACTITIONER

## 2020-07-14 PROCEDURE — 93922 UPR/L XTREMITY ART 2 LEVELS: CPT

## 2020-07-14 PROCEDURE — 71045 X-RAY EXAM CHEST 1 VIEW: CPT

## 2020-07-14 PROCEDURE — 87076 CULTURE ANAEROBE IDENT EACH: CPT | Performed by: NURSE PRACTITIONER

## 2020-07-14 PROCEDURE — 87070 CULTURE OTHR SPECIMN AEROBIC: CPT | Performed by: NURSE PRACTITIONER

## 2020-07-14 PROCEDURE — 85652 RBC SED RATE AUTOMATED: CPT | Performed by: INTERNAL MEDICINE

## 2020-07-14 PROCEDURE — 25010000002 CEFEPIME PER 500 MG: Performed by: NURSE PRACTITIONER

## 2020-07-14 PROCEDURE — 85025 COMPLETE CBC W/AUTO DIFF WBC: CPT | Performed by: INTERNAL MEDICINE

## 2020-07-14 PROCEDURE — 73720 MRI LWR EXTREMITY W/O&W/DYE: CPT

## 2020-07-14 RX ORDER — OXCARBAZEPINE 150 MG/1
300 TABLET, FILM COATED ORAL
Status: DISCONTINUED | OUTPATIENT
Start: 2020-07-14 | End: 2020-07-17 | Stop reason: HOSPADM

## 2020-07-14 RX ORDER — HYDRALAZINE HYDROCHLORIDE 25 MG/1
50 TABLET, FILM COATED ORAL 3 TIMES DAILY
Status: DISCONTINUED | OUTPATIENT
Start: 2020-07-14 | End: 2020-07-17 | Stop reason: HOSPADM

## 2020-07-14 RX ORDER — VANCOMYCIN 1.75 GRAM/500 ML IN 0.9 % SODIUM CHLORIDE INTRAVENOUS
1750 ONCE
Status: COMPLETED | OUTPATIENT
Start: 2020-07-14 | End: 2020-07-14

## 2020-07-14 RX ORDER — OXCARBAZEPINE 150 MG/1
150 TABLET, FILM COATED ORAL NIGHTLY
Status: DISCONTINUED | OUTPATIENT
Start: 2020-07-14 | End: 2020-07-17 | Stop reason: HOSPADM

## 2020-07-14 RX ORDER — RISPERIDONE 0.25 MG/1
0.5 TABLET ORAL 2 TIMES DAILY
Status: DISCONTINUED | OUTPATIENT
Start: 2020-07-14 | End: 2020-07-17 | Stop reason: HOSPADM

## 2020-07-14 RX ORDER — CLOPIDOGREL BISULFATE 75 MG/1
75 TABLET ORAL DAILY
Status: DISCONTINUED | OUTPATIENT
Start: 2020-07-15 | End: 2020-07-17 | Stop reason: HOSPADM

## 2020-07-14 RX ORDER — PROMETHAZINE HYDROCHLORIDE 12.5 MG/1
25 TABLET ORAL EVERY 6 HOURS PRN
COMMUNITY

## 2020-07-14 RX ORDER — CETIRIZINE HYDROCHLORIDE 10 MG/1
10 TABLET ORAL NIGHTLY
Status: DISCONTINUED | OUTPATIENT
Start: 2020-07-14 | End: 2020-07-17 | Stop reason: HOSPADM

## 2020-07-14 RX ORDER — HYDROCODONE BITARTRATE AND ACETAMINOPHEN 10; 325 MG/1; MG/1
1 TABLET ORAL EVERY 6 HOURS PRN
Status: DISCONTINUED | OUTPATIENT
Start: 2020-07-14 | End: 2020-07-17 | Stop reason: HOSPADM

## 2020-07-14 RX ORDER — METOPROLOL SUCCINATE 25 MG/1
25 TABLET, EXTENDED RELEASE ORAL DAILY
Status: DISCONTINUED | OUTPATIENT
Start: 2020-07-15 | End: 2020-07-17 | Stop reason: HOSPADM

## 2020-07-14 RX ORDER — MULTIVITAMIN,THERAPEUTIC
1 TABLET ORAL DAILY
Status: DISCONTINUED | OUTPATIENT
Start: 2020-07-14 | End: 2020-07-17 | Stop reason: HOSPADM

## 2020-07-14 RX ORDER — CETIRIZINE HYDROCHLORIDE 10 MG/1
10 TABLET ORAL NIGHTLY
COMMUNITY

## 2020-07-14 RX ORDER — SODIUM CHLORIDE 1000 MG
1 TABLET, SOLUBLE MISCELLANEOUS 2 TIMES DAILY
Status: DISCONTINUED | OUTPATIENT
Start: 2020-07-14 | End: 2020-07-17 | Stop reason: HOSPADM

## 2020-07-14 RX ORDER — MAGNESIUM SULFATE 1 G/100ML
1 INJECTION INTRAVENOUS AS NEEDED
Status: DISCONTINUED | OUTPATIENT
Start: 2020-07-14 | End: 2020-07-17 | Stop reason: HOSPADM

## 2020-07-14 RX ORDER — ONDANSETRON 4 MG/1
4 TABLET, FILM COATED ORAL EVERY 6 HOURS PRN
Status: DISCONTINUED | OUTPATIENT
Start: 2020-07-14 | End: 2020-07-17 | Stop reason: HOSPADM

## 2020-07-14 RX ORDER — OXCARBAZEPINE 300 MG/1
300 TABLET, FILM COATED ORAL 3 TIMES DAILY
COMMUNITY

## 2020-07-14 RX ORDER — ACETAMINOPHEN 325 MG/1
650 TABLET ORAL EVERY 4 HOURS PRN
Status: DISCONTINUED | OUTPATIENT
Start: 2020-07-14 | End: 2020-07-17 | Stop reason: HOSPADM

## 2020-07-14 RX ORDER — SODIUM CHLORIDE 0.9 % (FLUSH) 0.9 %
10 SYRINGE (ML) INJECTION AS NEEDED
Status: DISCONTINUED | OUTPATIENT
Start: 2020-07-14 | End: 2020-07-17 | Stop reason: HOSPADM

## 2020-07-14 RX ORDER — DICYCLOMINE HYDROCHLORIDE 10 MG/1
10 CAPSULE ORAL EVERY 6 HOURS PRN
COMMUNITY

## 2020-07-14 RX ORDER — FENOFIBRATE 145 MG/1
145 TABLET, COATED ORAL DAILY
Status: DISCONTINUED | OUTPATIENT
Start: 2020-07-15 | End: 2020-07-17 | Stop reason: HOSPADM

## 2020-07-14 RX ORDER — DOCUSATE SODIUM 100 MG/1
100 CAPSULE, LIQUID FILLED ORAL 2 TIMES DAILY
Status: DISCONTINUED | OUTPATIENT
Start: 2020-07-14 | End: 2020-07-17 | Stop reason: HOSPADM

## 2020-07-14 RX ORDER — PREDNISONE 1 MG/1
5 TABLET ORAL DAILY
Status: DISCONTINUED | OUTPATIENT
Start: 2020-07-14 | End: 2020-07-14

## 2020-07-14 RX ORDER — TRAZODONE HYDROCHLORIDE 100 MG/1
100 TABLET ORAL NIGHTLY
Status: DISCONTINUED | OUTPATIENT
Start: 2020-07-14 | End: 2020-07-17 | Stop reason: HOSPADM

## 2020-07-14 RX ORDER — AMLODIPINE BESYLATE 5 MG/1
5 TABLET ORAL DAILY
Status: DISCONTINUED | OUTPATIENT
Start: 2020-07-15 | End: 2020-07-17 | Stop reason: HOSPADM

## 2020-07-14 RX ORDER — MAGNESIUM SULFATE HEPTAHYDRATE 40 MG/ML
2 INJECTION, SOLUTION INTRAVENOUS AS NEEDED
Status: DISCONTINUED | OUTPATIENT
Start: 2020-07-14 | End: 2020-07-17 | Stop reason: HOSPADM

## 2020-07-14 RX ORDER — ACETAMINOPHEN 160 MG/5ML
650 SOLUTION ORAL EVERY 4 HOURS PRN
Status: DISCONTINUED | OUTPATIENT
Start: 2020-07-14 | End: 2020-07-17 | Stop reason: HOSPADM

## 2020-07-14 RX ORDER — SODIUM CHLORIDE 0.9 % (FLUSH) 0.9 %
10 SYRINGE (ML) INJECTION EVERY 12 HOURS SCHEDULED
Status: DISCONTINUED | OUTPATIENT
Start: 2020-07-14 | End: 2020-07-17 | Stop reason: HOSPADM

## 2020-07-14 RX ORDER — PANTOPRAZOLE SODIUM 40 MG/1
40 TABLET, DELAYED RELEASE ORAL DAILY
Status: DISCONTINUED | OUTPATIENT
Start: 2020-07-15 | End: 2020-07-17 | Stop reason: HOSPADM

## 2020-07-14 RX ORDER — ATORVASTATIN CALCIUM 40 MG/1
80 TABLET, FILM COATED ORAL NIGHTLY
Status: DISCONTINUED | OUTPATIENT
Start: 2020-07-14 | End: 2020-07-17 | Stop reason: HOSPADM

## 2020-07-14 RX ORDER — ACETAMINOPHEN 650 MG/1
650 SUPPOSITORY RECTAL EVERY 4 HOURS PRN
Status: DISCONTINUED | OUTPATIENT
Start: 2020-07-14 | End: 2020-07-17 | Stop reason: HOSPADM

## 2020-07-14 RX ORDER — PREGABALIN 75 MG/1
150 CAPSULE ORAL 3 TIMES DAILY
Status: DISCONTINUED | OUTPATIENT
Start: 2020-07-14 | End: 2020-07-17 | Stop reason: HOSPADM

## 2020-07-14 RX ORDER — HYDRALAZINE HYDROCHLORIDE 20 MG/ML
10 INJECTION INTRAMUSCULAR; INTRAVENOUS EVERY 6 HOURS PRN
Status: DISCONTINUED | OUTPATIENT
Start: 2020-07-14 | End: 2020-07-17 | Stop reason: HOSPADM

## 2020-07-14 RX ORDER — DULOXETIN HYDROCHLORIDE 20 MG/1
20 CAPSULE, DELAYED RELEASE ORAL DAILY
Status: DISCONTINUED | OUTPATIENT
Start: 2020-07-15 | End: 2020-07-17 | Stop reason: HOSPADM

## 2020-07-14 RX ORDER — ONDANSETRON 2 MG/ML
4 INJECTION INTRAMUSCULAR; INTRAVENOUS EVERY 6 HOURS PRN
Status: DISCONTINUED | OUTPATIENT
Start: 2020-07-14 | End: 2020-07-17 | Stop reason: HOSPADM

## 2020-07-14 RX ORDER — POTASSIUM CHLORIDE 20 MEQ/1
40 TABLET, EXTENDED RELEASE ORAL AS NEEDED
Status: DISCONTINUED | OUTPATIENT
Start: 2020-07-14 | End: 2020-07-17 | Stop reason: HOSPADM

## 2020-07-14 RX ORDER — MULTIPLE VITAMINS W/ MINERALS TAB 9MG-400MCG
1 TAB ORAL DAILY
Status: ON HOLD | COMMUNITY
End: 2022-01-28

## 2020-07-14 RX ADMIN — Medication 10 ML: at 21:08

## 2020-07-14 RX ADMIN — Medication 10 ML: at 17:51

## 2020-07-14 RX ADMIN — ATORVASTATIN CALCIUM 80 MG: 40 TABLET, FILM COATED ORAL at 21:06

## 2020-07-14 RX ADMIN — OXCARBAZEPINE 150 MG: 150 TABLET, FILM COATED ORAL at 21:08

## 2020-07-14 RX ADMIN — IOPAMIDOL 100 ML: 755 INJECTION, SOLUTION INTRAVENOUS at 17:30

## 2020-07-14 RX ADMIN — TRAZODONE HYDROCHLORIDE 100 MG: 100 TABLET ORAL at 21:08

## 2020-07-14 RX ADMIN — RISPERIDONE 0.5 MG: 0.25 TABLET ORAL at 21:08

## 2020-07-14 RX ADMIN — HYDRALAZINE HYDROCHLORIDE 50 MG: 25 TABLET, FILM COATED ORAL at 16:55

## 2020-07-14 RX ADMIN — GADOTERIDOL 16 ML: 279.3 INJECTION, SOLUTION INTRAVENOUS at 15:30

## 2020-07-14 RX ADMIN — HYDROCODONE BITARTRATE AND ACETAMINOPHEN 1 TABLET: 10; 325 TABLET ORAL at 21:24

## 2020-07-14 RX ADMIN — OXCARBAZEPINE 300 MG: 150 TABLET, FILM COATED ORAL at 18:13

## 2020-07-14 RX ADMIN — THERA TABS 1 TABLET: TAB at 16:55

## 2020-07-14 RX ADMIN — HYDRALAZINE HYDROCHLORIDE 50 MG: 25 TABLET, FILM COATED ORAL at 21:06

## 2020-07-14 RX ADMIN — VANCOMYCIN HYDROCHLORIDE 1750 MG: 10 INJECTION, POWDER, LYOPHILIZED, FOR SOLUTION INTRAVENOUS at 17:51

## 2020-07-14 RX ADMIN — PREGABALIN 150 MG: 75 CAPSULE ORAL at 21:06

## 2020-07-14 RX ADMIN — CEFEPIME HYDROCHLORIDE 2 G: 2 INJECTION, POWDER, FOR SOLUTION INTRAVENOUS at 16:45

## 2020-07-14 RX ADMIN — SODIUM CHLORIDE TAB 1 GM 1 G: 1 TAB at 21:08

## 2020-07-14 RX ADMIN — DOCUSATE SODIUM 100 MG: 100 CAPSULE, LIQUID FILLED ORAL at 21:06

## 2020-07-14 RX ADMIN — PREGABALIN 150 MG: 75 CAPSULE ORAL at 16:55

## 2020-07-14 RX ADMIN — CETIRIZINE HYDROCHLORIDE 10 MG: 10 TABLET, FILM COATED ORAL at 21:08

## 2020-07-14 NOTE — H&P
[facility] Medicine Services      Patient Name: Emery Nesbitt  : 1960  MRN: [id number]  Primary Care Physician: Wang Johnson MD  Date of admission: 2020    Patient Care Team:  Wang Johnson MD as PCP - General  Sharath Conklin MD as Consulting Physician (Cardiology)          Subjective   History Present Illness     Chief Complaint: Right great toe wound    Mr. Nesbitt is a 60 y.o. male with a past medical history of CAD, hypertension, hyperlipidemia, PVD status post bilateral bypass grafts, CVA, bipolar, anemia, and diabetes mellitus type 2 who was directly admitted to [facility] by vascular due to patient having a right great toe wound/cellulitis.  Hospitalist accepted the patient for further care and management.  Patient states he developed an ulcer on his right big toe 2 months ago.  He states it was due to his toe rubbing on his shoe.  He has been following up with Dr. Garcia outpatient and was given a new pair shoes.  Today he followed up with Dr. Olson and patient was found to have right great toe cellulitis.  Patient denies any pain.  He denies any recent nausea, vomiting, diarrhea, fever, chills, cough, shortness of breath, or chest pain.  He currently resides at [facility].           Review of Systems   Constitution: Negative.   HENT: Negative.    Cardiovascular: Negative.    Respiratory: Negative.    Skin:        Right great toe wound   Musculoskeletal:        Right great toe wound   Gastrointestinal: Negative.    Genitourinary: Negative.    Neurological: Negative.    Psychiatric/Behavioral: Negative.            Personal History     Past Medical History:   Past Medical History:   Diagnosis Date   • Bipolar disorder (CMS/[facility]) 2020   • Cellulitis 2020   • Coronary artery disease    • CVA (cerebral vascular accident) (CMS/[facility])    • Diabetes mellitus (CMS/[facility])    • Hyperlipidemia    • Hypertension    • Neuropathy    • Peripheral  vascular disease (CMS/[facility])    • Schizophrenia (CMS/[facility])        Surgical History:      Past Surgical History:   Procedure Laterality Date   • CARDIAC CATHETERIZATION     • CORONARY ANGIOPLASTY             Family History: family history is not on file. He was adopted. Otherwise pertinent FHx was reviewed and unremarkable.     Social History:  reports that he quit smoking about 4 years ago. He has never used smokeless tobacco. He reports that he does not drink alcohol or use drugs.      Medications:  Prior to Admission medications    Medication Sig Start Date End Date Taking? Authorizing Provider   acetaminophen (TYLENOL) 325 MG tablet  5/5/20   Arjun Yousif MD   amLODIPine (NORVASC) 5 MG tablet Take 5 mg by mouth Daily.    Arjun Yousif MD   aspirin (ASPIR-LOW) 81 MG EC tablet ASPIR-LOW 81 MG TBEC 7/19/17   Arjun Yousif MD   atorvastatin (LIPITOR) 80 MG tablet ATORVASTATIN CALCIUM 80 MG TABS 7/19/17   Arjun Yousif MD   Chlorpheniramine-Acetaminophen (CORICIDIN) 2-325 MG tablet Take  by mouth.    Arjun Yousif MD   CloNIDine (CATAPRES) 0.2 MG tablet Take 0.1 mg by mouth 3 (Three) Times a Day As Needed. 7/15/15   Arjun Yousif MD   clopidogrel (PLAVIX) 75 MG tablet PLAVIX 75 MG TABS 9/23/15   Arjun Yousif MD   cyclobenzaprine (FLEXERIL) 10 MG tablet CYCLOBENZAPRINE HCL 10 MG TABS 12/1/16   Arjun Yousif MD   docusate sodium (COLACE) 100 MG capsule COLACE 100 MG CAPS 9/23/15   Arjun Yousif MD   DULoxetine (CYMBALTA) 20 MG capsule Take 20 mg by mouth Daily.    Arjun Yousif MD   escitalopram (LEXAPRO) 10 MG tablet ESCITALOPRAM OXALATE 10 MG TABS 2/20/17   Arjun Yousif MD   fenofibrate (TRICOR) 145 MG tablet TRICOR 145 MG TABS 9/23/15   Arjun Yousif MD   ferrous sulfate (FE TABS) 325 (65 FE) MG EC tablet FE TABS 325 (65 Fe) MG TBEC 7/15/15   Arjun Yousif MD   gabapentin (NEURONTIN) 600 MG tablet GABAPENTIN 600 MG  TABS 7/15/15   Arjun Yousif MD   guaifenesin-dextromethorphan (ROBITUSSIN DM) 100-10 MG/5ML syrup GUAIFENESIN--10 MG/5ML SYRP 12/1/16   Arjun Yousif MD   hydrALAZINE (APRESOLINE) 50 MG tablet 3 (Three) Times a Day. 5/4/20   Arjun Yousif MD   HYDROcodone-acetaminophen (NORCO)  MG per tablet NORCO  MG TABS 5/4/16   Arjun Yousif MD   hydrOXYzine (ATARAX) 25 MG tablet HYDROXYZINE HCL 25 MG TABS 12/1/16   Arjun Yousif MD   levocetirizine (XYZAL) 5 MG tablet XYZAL 5 MG ORAL TABLET 3/14/18   Arjun Yousif MD   lisinopril (PRINIVIL,ZESTRIL) 10 MG tablet LISINOPRIL 10 MG TABS 7/15/15   Arjun Yousif MD   loperamide (IMODIUM) 2 MG capsule Take 2 mg by mouth 4 (Four) Times a Day As Needed for Diarrhea.    Arjun Yousif MD   loratadine (CLARITIN) 10 MG tablet CLARITIN 10 MG TABS 9/23/15   Arjun Yousif MD   magnesium oxide (MAGNESIUM-OXIDE) 400 (241.3 Mg) MG tablet tablet MAGNESIUM-OXIDE 400 (241.3 Mg) MG TABS 7/15/15   Arjun Yousif MD   metFORMIN (GLUCOPHAGE) 500 MG tablet METFORMIN  MG TABS 9/23/15   Arjun Yousif MD   metoprolol succinate XL (TOPROL-XL) 25 MG 24 hr tablet Take 25 mg by mouth Daily.    Arjun Yousif MD   nitroglycerin (NITROLINGUAL) 0.4 MG/SPRAY spray NITROLINGUAL 0.4 MG/SPRAY SOLN 3/14/18   Arjun Yousif MD   OXcarbazepine (TRILEPTAL) 300 MG tablet OXCARBAZEPINE 300 MG TABS 9/23/15   Arjun Yousif MD   pantoprazole (PROTONIX) 40 MG EC tablet PANTOPRAZOLE SODIUM 40 MG TBEC 2/20/17   Arjun Yousif MD   pentoxifylline (TRENtal) 400 MG CR tablet PENTOXIFYLLINE  MG CR-TABS 3/14/18   Arjun Yousif MD   Polyethylene Glycol 3350 (MIRALAX PO) MIRALAX POWD 12/1/16   Provider, MD Arjun   predniSONE (DELTASONE) 5 MG tablet Take 5 mg by mouth Daily.    Provider, Arjun, MD   pregabalin (LYRICA) 50 MG capsule 3 (Three) Times a Day. 5/4/16   Provider  MD Arjun   risperiDONE (risperDAL) 0.5 MG tablet Take 0.5 mg by mouth 2 (Two) Times a Day.    Arjun Yousif MD   rOPINIRole (REQUIP) 0.5 MG tablet REQUIP 0.5 MG TABS 9/23/15   Arjun Yousif MD   sodium chloride 1 g tablet Take 1 g by mouth 3 (Three) Times a Day.    Arjun Yousif MD   traZODone (DESYREL) 50 MG tablet Take 100 mg by mouth Every Night.    Arjun Yousif MD   zolpidem (AMBIEN) 10 MG tablet AMBIEN 10 MG TABS 12/1/16   Arjun Yousif MD       Allergies:  No Known Allergies    Objective   Objective     Vital Signs  Temp:  [97.7 °F (36.5 °C)] 97.7 °F (36.5 °C)  Heart Rate:  [66] 66  Resp:  [17] 17  BP: (162)/(84) 162/84  SpO2:  [99 %] 99 %  on   ;   Device (Oxygen Therapy): room air  Body mass index is 26.7 kg/m².    Physical Exam   Constitutional: He is oriented to person, place, and time. He appears well-developed and well-nourished.   HENT:   Head: Normocephalic and atraumatic.   Right Ear: External ear normal.   Left Ear: External ear normal.   Nose: Nose normal.   Mouth/Throat: Oropharynx is clear and moist.   Eyes: Pupils are equal, round, and reactive to light. Conjunctivae and EOM are normal.   Neck: Normal range of motion.   Cardiovascular: Normal rate, regular rhythm and normal heart sounds.   S1, S2 audible   Pulmonary/Chest: Effort normal and breath sounds normal.   On room air    Abdominal: Soft. Bowel sounds are normal.   Musculoskeletal: Normal range of motion.   Neurological: He is alert and oriented to person, place, and time.   Skin: Skin is warm. There is erythema.   Erythema, slight swelling and open wound on right great toe with bloody drainage    Psychiatric: He has a normal mood and affect. His behavior is normal. Judgment and thought content normal.   Vitals reviewed.      Results Review:      Results from last 7 days   Lab Units 07/14/20  1216   WBC 10*3/mm3 7.50   HEMOGLOBIN g/dL 11.0*   HEMATOCRIT % 31.8*   PLATELETS 10*3/mm3 157          Results from last 7 days   Lab Units 07/14/20  1216   SODIUM mmol/L 131*   POTASSIUM mmol/L 4.0   CHLORIDE mmol/L 95*   CO2 mmol/L 26.0   CREATININE mg/dL 0.73*   GLUCOSE mg/dL 80   CALCIUM mg/dL 9.2   ALT (SGPT) U/L 21   AST (SGOT) U/L 19   LACTATE mmol/L 0.7     Estimated Creatinine Clearance: 121.7 mL/min (A) (by C-G formula based on SCr of 0.73 mg/dL (L)).  Brief Urine Lab Results     None          Microbiology Results (last 10 days)     ** No results found for the last 240 hours. **          ECG/EMG Results (most recent)     Procedure Component Value Units Date/Time    ECG 12 Lead [[id number]] Collected:  07/14/20 1155     Updated:  07/14/20 1157    Narrative:       HEART RATE= 62  bpm  RR Interval= 968  ms  WV Interval= 77  ms  P Horizontal Axis=   deg  P Front Axis= 0  deg  QRSD Interval= 95  ms  QT Interval= 368  ms  QRS Axis= 37  deg  T Wave Axis= 5  deg  - NORMAL ECG -  Sinus rhythm  When compared with ECG of 29-Aug-2015 3:16:38,  No significant change  Electronically Signed By:   Date and Time of Study: 2020-07-14 11:55:26                    Xr Chest 1 View    Result Date: 7/14/2020  Old healed granulomatous disease. No acute process identified in the chest  Electronically Signed By-Melo Sims On:7/14/2020 12:06 PM This report was finalized on [id number] by  Melo Sims, .        Estimated Creatinine Clearance: 121.7 mL/min (A) (by C-G formula based on SCr of 0.73 mg/dL (L)).    Assessment/Plan   Assessment/Plan       Active Hospital Problems    Diagnosis  POA   • **Diabetic foot ulcer (CMS/HCC) [E11.621, L97.509]  Yes     Priority: High   • Cellulitis of right toe [L03.031]  Yes     Priority: High   • Hyponatremia [E87.1]  Yes   • Hypomagnesemia [E83.42]  Yes   • Seasonal allergies [J30.2]  Yes   • Anxiety disorder [F41.9]  Yes   • RLS (restless legs syndrome) [G25.81]  Yes   • Peripheral neuropathy [G62.9]  Yes   • History of CVA (cerebrovascular accident) [Z86.73]  Not Applicable   • Bipolar  affective disorder (CMS/[facility]) [F31.9]  Yes   • Peripheral vascular disease (CMS/[facility]) [I73.9]  Yes   • Anemia [D64.9]  Yes   • Coronary artery disease [I25.10]  Yes   • Diabetes mellitus (CMS/[facility]) [E11.9]  Yes   • Hyperlipidemia [E78.5]  Yes   • Hypertension [I10]  Yes      Resolved Hospital Problems   No resolved problems to display.     Right first toe cellulitis/Diabetic ulcer   Rule out osteomyelitis   Check CBC, CMP, CRP, ESR, EKG, and CXR   MRI right foot ordered   Wound culture ordered   Start cefepime and vancomycin   Continue home hydrocodone (INSPECT verified)  Consult podiatry, vascular, and ID    CAD   Continue Plavix, metoprolol , and statin    HLD   Continue statin and tricor    Essential HTN   Monitor blood pressure   Uncontrolled   Continue hydralazine and amlodipine  IV hydralazine ordered PRN     Diabetes Mellitus Type II with peripheral neuropathy   Start sliding scale, Accuchecks   Check hbgA1C   Holding home metformin   Continue lyrica (INSPECT verified)     PVD with history of bilateral bypass grafts   Continue trental     Restless leg syndrome   Continue requip     History of CVA   - Patient is wheelchair bound to BLE weakness     GERD   Continue PPI    Chronic hypomagenesemia   Check mag   Continue mag oxide     Chronic hyponatremia  -Monitor sodium  - Continue home sodium chloride     Chronic anemia   Check CBC   Continue ferrous sulfate  Small amount of bleeding noted on right big toe dressing- minimal     Anxiety/bipolar   Continue lexpro, cymbalta, trileptal,  and respiradol   Stable    Insomnia   Continue trazodone    Seasonal allergies  - Continue zyrtec               VTE Prophylaxis - SCD's      CODE STATUS:    Code Status and Medical Interventions:   Ordered at: 07/14/20 1157     Level Of Support Discussed With:    Patient     Code Status:    CPR     Medical Interventions (Level of Support Prior to Arrest):    Full       This patient has been examined wearing appropriate Personal  Protective Equipment . 07/14/20      I discussed the patient's findings and my recommendations with patient and nursing staff.        Electronically signed by PRERNA Carballo, 07/14/20, 11:21 AM.  [facility], [place], [facility]

## 2020-07-14 NOTE — PLAN OF CARE
Problem: Patient Care Overview  Goal: Plan of Care Review  Outcome: Ongoing (interventions implemented as appropriate)  Flowsheets  Taken 7/14/2020 1515  Progress: no change  Outcome Summary: Patient a direct admit of cellulitis, diabetic ulcer of left grand toe. Culture sent down for testing. CT, doppler, and MRI ordered. Pt from [place]. Wheelchair bound. podiatry and vascular consulted. IV antibiotics started. Will continue to monitor.  Taken 7/14/2020 1115  Plan of Care Reviewed With: patient

## 2020-07-14 NOTE — PROGRESS NOTES
Attempted to see this patient today but he was down getting multiple imaging studies.  Apparently the patient has cellulitis and a wound that probes to the bone on the great right toe.  Vancomycin and cefepime already started.  Will attempt to see the patient again tomorrow.

## 2020-07-14 NOTE — PROGRESS NOTES
"Pharmacy Antimicrobial Dosing Service    Subjective:  Emery Nesbitt is a 60 y.o.male admitted with diabetic foot ulcer. Pharmacy has been consulted to dose Vancomycin for possible SSTI.  R/o osteomyelitis; vascular progress note states right great toe wound that easily probes to bone.    PMH: DM, PVD, s/p fem-pop bypass, multiple strokes (wheelchair bound 2/2 BLE weakness)      Assessment/Plan    1. Day #1 Vancomycin: Goal -600 mcg*h/mL.   Will give load dose of 1750mg x1 (~21 mg/kg) followed by maintenance dosing of 1250mg Q12h (~15 mg/kg). Obtain trough 7/16 04:00, at steady state prior to 4th total dose (or sooner if clinically warranted). Obtain random level 7/15 21:00, for pharmacist to calculate patient-specific pharmacokinetic parameters and AUC.    2. Day #1 Cefepime:  2gm IV Q8h for estCrCl > 60 mL/min.    Will continue to monitor drug levels, renal function, culture and sensitivities, and patient clinical status.       Objective:  Relevant clinical data and objective history reviewed:  175.3 cm (69\")   82 kg (180 lb 12.4 oz)   Ideal body weight: 70.7 kg (155 lb 13.8 oz)  Adjusted ideal body weight: 75.2 kg (165 lb 13.3 oz)  Body mass index is 26.7 kg/m².        Results from last 7 days   Lab Units 07/14/20  1216   CREATININE mg/dL 0.73*     Estimated Creatinine Clearance: 121.7 mL/min (A) (by C-G formula based on SCr of 0.73 mg/dL (L)).  No intake/output data recorded.    Results from last 7 days   Lab Units 07/14/20  1216   WBC 10*3/mm3 7.50     Temperature    07/14/20 1104   Temp: 97.7 °F (36.5 °C)     Baseline culture/source/susceptibility:  Microbiology Results (last 10 days)       ** No results found for the last 240 hours. **             Anti-Infectives (From admission, onward)      Ordered     Dose/Rate Route Frequency Start Stop    07/14/20 1204  vancomycin IVPB 1750 mg in 0.9% Sodium Chloride (premix) 500 mL     Ordering Provider:  Pastora Bowman APRN    1,750 mg  over 120 Minutes " Intravenous Once 07/14/20 1300      07/14/20 1155  cefepime 2 gm IVPB in 100 ml NS (MBP)     Ordering Provider:  Pastora Bowman APRN    2 g  over 30 Minutes Intravenous Once 07/14/20 1215      07/14/20 1155  Pharmacy to dose vancomycin  Review   Ordering Provider:  Pastora Bowman APRN     Does not apply Continuous PRN 07/14/20 1155 07/21/20 1154            Scarlett Oates PharmD, BCPS  07/14/20 13:42

## 2020-07-14 NOTE — CONSULTS
20   Foot and Ankle Surgery - Consult  Provider: Dr. Jimbo DPM  Location: [facility]    Subjective:  Emery Nesbitt is a 60 y.o. male.  That has a longstanding history of an ulceration to his right great toe.  Patient [facility] done at [facility] that showed monophasic waveform in the right foot.  Patient has a vascular appointment which may have been today or sometime this month for evaluation in the meantime patient was doing wound care consisting of Betadine wet-to-dry.        Consulting Physician: Dr. Sheikh  Reason for Consult: Wound right great toe    HPI: Patient developed a wound to his right great toe and was doing offloading with a surgical shoe Betadine wet-to-dry and a vascular appointment was made based upon his poor pulses.  The report was done at [facility] which showed monophasic waveform.  A vascular consult was made on an outpatient basis since patient was stable and there was no sign of infection at the time.  Patient saw vascular surgery today and he was admitted for further work-up    No Known Allergies    Past Medical History:   Diagnosis Date   • Bipolar disorder (CMS/[facility]) 2020   • Cellulitis 2020   • Coronary artery disease    • CVA (cerebral vascular accident) (CMS/[facility])    • Diabetes mellitus (CMS/[facility])    • Hyperlipidemia    • Hypertension    • Neuropathy    • Peripheral vascular disease (CMS/[facility])    • Schizophrenia (CMS/[facility])        Past Surgical History:   Procedure Laterality Date   • CARDIAC CATHETERIZATION     • CORONARY ANGIOPLASTY         Family History   Adopted: Yes       Social History     Socioeconomic History   • Marital status: Single     Spouse name: Not on file   • Number of children: Not on file   • Years of education: Not on file   • Highest education level: Not on file   Tobacco Use   • Smoking status: Former Smoker     Last attempt to quit: 2016     Years since quittin.5   • Smokeless tobacco: Never Used    Substance and Sexual Activity   • Alcohol use: Never     Frequency: Never   • Drug use: Never          Current Facility-Administered Medications:   •  [START ON 7/15/2020] !Vancomycin Level Draw Needed, , Does not apply, Once, Jac Sheikh,   •  [START ON 7/16/2020] !Vancomycin Level Draw Needed, , Does not apply, Once, Jac Sheikh, DO  •  acetaminophen (TYLENOL) tablet 650 mg, 650 mg, Oral, Q4H PRN **OR** acetaminophen (TYLENOL) 160 MG/5ML solution 650 mg, 650 mg, Oral, Q4H PRN **OR** acetaminophen (TYLENOL) suppository 650 mg, 650 mg, Rectal, Q4H PRN, Pastora Bowman APRN  •  [START ON 7/15/2020] amLODIPine (NORVASC) tablet 5 mg, 5 mg, Oral, Daily, Pastora Bowman APRN  •  atorvastatin (LIPITOR) tablet 80 mg, 80 mg, Oral, Nightly, Pastora Bowman APRN  •  cefepime 2 gm IVPB in 100 ml NS (MBP), 2 g, Intravenous, Once, Pastora Bowman APRELOY  •  [START ON 7/15/2020] cefepime 2 gm IVPB in 100 ml NS (MBP), 2 g, Intravenous, Q8H, Jac Sheikh, DO  •  cetirizine (zyrTEC) tablet 10 mg, 10 mg, Oral, Nightly, Pastora Bowman APRN  •  [START ON 7/15/2020] clopidogrel (PLAVIX) tablet 75 mg, 75 mg, Oral, Daily, Pastora Bowman APRN  •  docusate sodium (COLACE) capsule 100 mg, 100 mg, Oral, BID, Pastora Bowman APRN  •  [START ON 7/15/2020] DULoxetine (CYMBALTA) DR capsule 20 mg, 20 mg, Oral, Daily, Pastora Bowman APRN  •  [START ON 7/15/2020] fenofibrate (TRICOR) tablet 145 mg, 145 mg, Oral, Daily, Pastora Bowman APRN  •  hydrALAZINE (APRESOLINE) injection 10 mg, 10 mg, Intravenous, Q6H PRN, Pastora Bowman APRN  •  hydrALAZINE (APRESOLINE) tablet 50 mg, 50 mg, Oral, TID, Pastora Bowman APRN  •  HYDROcodone-acetaminophen (NORCO)  MG per tablet 1 tablet, 1 tablet, Oral, Q6H PRN, Pastora Bowman APRN  •  [START ON 7/15/2020] magnesium oxide (MAGOX) tablet 400 mg, 400 mg, Oral, Daily, Pastora Bowman APRN  •  Magnesium Sulfate 2 gram infusion - Mg less than or  equal to 1.5 mg/dL, 2 g, Intravenous, PRN **OR** Magnesium Sulfate 1 gram infusion - Mg 1.6-1.9 mg/dL, 1 g, Intravenous, PRN, Pastora Bowman APRN  •  [START ON 7/15/2020] metoprolol succinate XL (TOPROL-XL) 24 hr tablet 25 mg, 25 mg, Oral, Daily, Pastora Bowman APRN  •  ondansetron (ZOFRAN) tablet 4 mg, 4 mg, Oral, Q6H PRN **OR** ondansetron (ZOFRAN) injection 4 mg, 4 mg, Intravenous, Q6H PRN, Pastora Bowman APRELOY  •  OXcarbazepine (TRILEPTAL) tablet 150 mg, 150 mg, Oral, Nightly, Pastora Bowman APRN  •  OXcarbazepine (TRILEPTAL) tablet 300 mg, 300 mg, Oral, TID - Nitrates, Pastora Bowman APRN  •  [START ON 7/15/2020] pantoprazole (PROTONIX) EC tablet 40 mg, 40 mg, Oral, Daily, Pastora Bowman APRN  •  Pharmacy to dose vancomycin, , Does not apply, Continuous PRN, Pastora Bowman APRN  •  potassium chloride (K-DUR,KLOR-CON) CR tablet 40 mEq, 40 mEq, Oral, PRN, Pastora Bowman APRN  •  pregabalin (LYRICA) capsule 150 mg, 150 mg, Oral, TID, Pastora Bowman APRN  •  risperiDONE (risperDAL) tablet 0.5 mg, 0.5 mg, Oral, BID, Pastora Bowman APRN  •  sodium chloride 0.9 % flush 10 mL, 10 mL, Intravenous, Q12H, Pastora Bowman APRN  •  sodium chloride 0.9 % flush 10 mL, 10 mL, Intravenous, PRN, Pastora Bowman, APRN  •  sodium chloride tablet 1 g, 1 g, Oral, BID, Pastora Bowman APRN  •  Thera tablet 1 tablet, 1 tablet, Oral, Daily, Pastora Bowman, PRRENA  •  traZODone (DESYREL) tablet 100 mg, 100 mg, Oral, Nightly, Pastora Bowman APRN  •  [START ON 7/15/2020] vancomycin 1250 mg/250 mL 0.9% NS IVPB ([facility]), 1,250 mg, Intravenous, Q12H, Jac Sheikh, DO  •  vancomycin IVPB 1750 mg in 0.9% Sodium Chloride (premix) 500 mL, 1,750 mg, Intravenous, Once, Pastora Bowman, PRERNA    Review of Systems:  General: Denies fever, chills, fatigue, and weakness.  Eyes: Denies vision loss, blurry vision, and excessive redness.  ENT: Denies hearing issues and difficulty  "swallowing.  Cardiovascular: Denies palpitations, chest pain, or syncopal episodes.  Respiratory: Denies shortness of breath, wheezing, and coughing.  GI: Denies abdominal pain, nausea, and vomiting.   : Denies frequency, hematuria, and urgency.  Musculoskeletal: Denies muscle cramps, joint pains, and stiffness.  Derm: Denies rash, open wounds, or suspicious lesions.  Neuro: Denies headaches, numbness, loss of coordination, and tremors.  Psych: Denies anxiety and depression.  Endocrine: Denies temperature intolerance and changes in appetite.  Heme: Denies bleeding disorders or abnormal bruising.     Objective   /84 (BP Location: Left arm, Patient Position: Lying)   Pulse 66   Temp 97.7 °F (36.5 °C) (Oral)   Resp 17   Ht 175.3 cm (69\")   Wt 82 kg (180 lb 12.4 oz)   SpO2 99%   BMI 26.70 kg/m²     Foot/Ankle Exam right lower extremity  Vascular: Pulses are difficult to palpate capillary fill times delayed in digits 1 through 5 on the right skin is noted to be shiny and taut no hair growth is present  Neurological: Light touch and protective sensation is diminished in the right lower extremity  Skin: Right toe is erythematous and edematous to the base of the proximal phalanx of the great toe.  There is noted to be a wound at the IPJ joint, has a moderate amount of serous drainage.  There is noted to be granular tissue present along with what appears to be some bone exposure.  Everything appears to be very localized  Muscular skeletal strength: Patient can dorsiflex and plantarflex his foot however he is not very ambulatory but he does transfer.         Results from last 7 days   Lab Units 07/14/20  1216   WBC 10*3/mm3 7.50   HEMOGLOBIN g/dL 11.0*   HEMATOCRIT % 31.8*   PLATELETS 10*3/mm3 157       Assessment/Plan   Patient Active Problem List   Diagnosis   • Anemia   • Pre-operative cardiovascular examination   • Peripheral vascular disease (CMS/HCC)   • Hypertension   • Hyperlipidemia   • Diabetic foot " ulcer (CMS/HCC)   • Diabetes mellitus (CMS/HCC)   • Coronary artery disease   • Coronary angioplasty status   • Claudication (CMS/HCC)   • Chest pain   • Bipolar affective disorder (CMS/HCC)   • Skin ulcer (CMS/HCC)   • Cellulitis of right toe   • Hyponatremia   • Hypomagnesemia   • Seasonal allergies   • Anxiety disorder   • RLS (restless legs syndrome)   • Peripheral neuropathy   • History of CVA (cerebrovascular accident)     Study Result     MRI FOOT RIGHT W WO CONTRAST     Date of Exam: 7/14/2020 15:10 EDT     Indication: Osteomyelitis suspected, foot swelling, diabetic.  Diabetic ulcer dorsal right great toe.      Comparison Exams: None available.     Technique: Prior to and after uneventful administration of 16 cc ProHance IV contrast, multiplanar multisequence images of the foot performed according to routine foot MRI protocol.     FINDINGS:  There is homogeneous low T1 high T2 signal intensity involving the distal one half of the proximal phalanx of the first digit. This involves the distal 1.8 cm. There is some possible loss of cortex along the medial aspect of the distal proximal phalanx.   There is an ulceration along the medial dorsal aspect of the toe in this location. This is consistent with osteomyelitis. There is more heterogeneous low T1 high T2 signal intensity involving the base of the distal phalanx of the great toe measuring   about 7 mm from proximal to distal. No drainable fluid collection to suggest abscess is seen. There is diffuse subcutaneous edema of the foot more pronounced lateral than medial.     Mild muscular atrophy is present. No intermetatarsal masses are seen. Mild joint space narrowing first metatarsophalangeal joint. Hallux sesamoid bones have normal size, position and signal intensity. There is increased enhancement of the marrow   involving the proximal phalanx and base of distal phalanx of the great toe. The remainder of the enhancement is physiologic.      IMPRESSION:  1.Osteomyelitis of the proximal phalanx of the great toe and proximal one half of the distal phalanx great toe with ulceration along the medial dorsal cortex.  2.No drainable fluid collection to suggest osteomyelitis.  3.Cellulitis of the great toe.     Electronically Signed: Jessica Morales MD 7/14/2020 16:14 EDT   Imaging     MRI is suggestive of osteomyelitis in the right great toe.  Patient would benefit from amputation of the right great toe.  It appears to be his pressure in his right great toe was 71 mmHg with an CESAR of 0.73 in the right lower extremity.  We will see if vascular recommends any possible revascularization or if they recommend to proceed with surgical intervention.  If they recommend proceed with surgical intervention without any revascularization then probably will shoot for Friday morning depending on the schedule if full may try to do earlier in the evening.  We will have a better picture of a plan when I see him tomorrow afternoon.  At this point continue with dressing changes which were going to start Betadine wet-to-dry and 1 was placed on his right great toe today we will do this twice daily.  Antibiotics per infectious disease.  We will see tomorrow.  Continue with current care    Thank you for the consultation and allowing me to participate in this patient's care. Please call with any additional questions or concerns.     Note is dictated utilizing voice recognition software. Unfortunately this leads to occasional typographical errors. I apologize in advance if the situation occurs. If questions occur please do not hesitate to call our office.

## 2020-07-15 LAB
ALBUMIN SERPL-MCNC: 3.6 G/DL (ref 3.5–5.2)
ALBUMIN/GLOB SERPL: 2.3 G/DL
ALP SERPL-CCNC: 37 U/L (ref 39–117)
ALT SERPL W P-5'-P-CCNC: 17 U/L (ref 1–41)
ANION GAP SERPL CALCULATED.3IONS-SCNC: 10 MMOL/L (ref 5–15)
AST SERPL-CCNC: 14 U/L (ref 1–40)
BASOPHILS # BLD AUTO: 0 10*3/MM3 (ref 0–0.2)
BASOPHILS NFR BLD AUTO: 0.6 % (ref 0–1.5)
BILIRUB SERPL-MCNC: 0.2 MG/DL (ref 0–1.2)
BUN SERPL-MCNC: 12 MG/DL (ref 8–23)
BUN SERPL-MCNC: ABNORMAL MG/DL
BUN/CREAT SERPL: ABNORMAL
CALCIUM SPEC-SCNC: 9.2 MG/DL (ref 8.6–10.5)
CHLORIDE SERPL-SCNC: 96 MMOL/L (ref 98–107)
CO2 SERPL-SCNC: 26 MMOL/L (ref 22–29)
CREAT SERPL-MCNC: 0.79 MG/DL (ref 0.76–1.27)
DEPRECATED RDW RBC AUTO: 50.3 FL (ref 37–54)
EOSINOPHIL # BLD AUTO: 0.1 10*3/MM3 (ref 0–0.4)
EOSINOPHIL NFR BLD AUTO: 1.8 % (ref 0.3–6.2)
ERYTHROCYTE [DISTWIDTH] IN BLOOD BY AUTOMATED COUNT: 16.8 % (ref 12.3–15.4)
GFR SERPL CREATININE-BSD FRML MDRD: 99 ML/MIN/1.73
GLOBULIN UR ELPH-MCNC: 1.6 GM/DL
GLUCOSE SERPL-MCNC: 104 MG/DL (ref 65–99)
HCT VFR BLD AUTO: 30.4 % (ref 37.5–51)
HGB BLD-MCNC: 10.5 G/DL (ref 13–17.7)
LYMPHOCYTES # BLD AUTO: 1.5 10*3/MM3 (ref 0.7–3.1)
LYMPHOCYTES NFR BLD AUTO: 25 % (ref 19.6–45.3)
MAGNESIUM SERPL-MCNC: 1.2 MG/DL (ref 1.6–2.4)
MCH RBC QN AUTO: 29.5 PG (ref 26.6–33)
MCHC RBC AUTO-ENTMCNC: 34.4 G/DL (ref 31.5–35.7)
MCV RBC AUTO: 85.7 FL (ref 79–97)
MONOCYTES # BLD AUTO: 0.8 10*3/MM3 (ref 0.1–0.9)
MONOCYTES NFR BLD AUTO: 12.7 % (ref 5–12)
NEUTROPHILS NFR BLD AUTO: 3.6 10*3/MM3 (ref 1.7–7)
NEUTROPHILS NFR BLD AUTO: 59.9 % (ref 42.7–76)
NRBC BLD AUTO-RTO: 0 /100 WBC (ref 0–0.2)
PLATELET # BLD AUTO: 157 10*3/MM3 (ref 140–450)
PMV BLD AUTO: 9.4 FL (ref 6–12)
POTASSIUM SERPL-SCNC: 3.9 MMOL/L (ref 3.5–5.2)
PROT SERPL-MCNC: 5.2 G/DL (ref 6–8.5)
RBC # BLD AUTO: 3.55 10*6/MM3 (ref 4.14–5.8)
SODIUM SERPL-SCNC: 132 MMOL/L (ref 136–145)
VANCOMYCIN PEAK SERPL-MCNC: 9.6 MCG/ML (ref 20–40)
WBC # BLD AUTO: 6 10*3/MM3 (ref 3.4–10.8)

## 2020-07-15 PROCEDURE — 80053 COMPREHEN METABOLIC PANEL: CPT | Performed by: NURSE PRACTITIONER

## 2020-07-15 PROCEDURE — 99232 SBSQ HOSP IP/OBS MODERATE 35: CPT | Performed by: INTERNAL MEDICINE

## 2020-07-15 PROCEDURE — 25010000002 CEFEPIME PER 500 MG: Performed by: INTERNAL MEDICINE

## 2020-07-15 PROCEDURE — 25010000002 VANCOMYCIN 10 G RECONSTITUTED SOLUTION: Performed by: INTERNAL MEDICINE

## 2020-07-15 PROCEDURE — 83735 ASSAY OF MAGNESIUM: CPT | Performed by: NURSE PRACTITIONER

## 2020-07-15 PROCEDURE — 80202 ASSAY OF VANCOMYCIN: CPT | Performed by: INTERNAL MEDICINE

## 2020-07-15 PROCEDURE — 85025 COMPLETE CBC W/AUTO DIFF WBC: CPT | Performed by: NURSE PRACTITIONER

## 2020-07-15 RX ADMIN — AMLODIPINE BESYLATE 5 MG: 5 TABLET ORAL at 08:26

## 2020-07-15 RX ADMIN — MAGNESIUM OXIDE TAB 400 MG (241.3 MG ELEMENTAL MG) 400 MG: 400 (241.3 MG) TAB at 08:25

## 2020-07-15 RX ADMIN — PREGABALIN 150 MG: 75 CAPSULE ORAL at 21:16

## 2020-07-15 RX ADMIN — PANTOPRAZOLE SODIUM 40 MG: 40 TABLET, DELAYED RELEASE ORAL at 08:26

## 2020-07-15 RX ADMIN — CETIRIZINE HYDROCHLORIDE 10 MG: 10 TABLET, FILM COATED ORAL at 21:16

## 2020-07-15 RX ADMIN — SODIUM CHLORIDE TAB 1 GM 1 G: 1 TAB at 21:16

## 2020-07-15 RX ADMIN — THERA TABS 1 TABLET: TAB at 08:25

## 2020-07-15 RX ADMIN — DULOXETINE 20 MG: 20 CAPSULE, DELAYED RELEASE PELLETS ORAL at 08:25

## 2020-07-15 RX ADMIN — PREGABALIN 150 MG: 75 CAPSULE ORAL at 17:31

## 2020-07-15 RX ADMIN — RISPERIDONE 0.5 MG: 0.25 TABLET ORAL at 08:24

## 2020-07-15 RX ADMIN — HYDROCODONE BITARTRATE AND ACETAMINOPHEN 1 TABLET: 10; 325 TABLET ORAL at 18:23

## 2020-07-15 RX ADMIN — SODIUM CHLORIDE TAB 1 GM 1 G: 1 TAB at 08:25

## 2020-07-15 RX ADMIN — OXCARBAZEPINE 300 MG: 150 TABLET, FILM COATED ORAL at 08:25

## 2020-07-15 RX ADMIN — SODIUM CHLORIDE 1250 MG: 9 INJECTION, SOLUTION INTRAVENOUS at 17:31

## 2020-07-15 RX ADMIN — HYDRALAZINE HYDROCHLORIDE 50 MG: 25 TABLET, FILM COATED ORAL at 21:16

## 2020-07-15 RX ADMIN — OXCARBAZEPINE 300 MG: 150 TABLET, FILM COATED ORAL at 13:09

## 2020-07-15 RX ADMIN — ATORVASTATIN CALCIUM 80 MG: 40 TABLET, FILM COATED ORAL at 21:15

## 2020-07-15 RX ADMIN — CEFEPIME HYDROCHLORIDE 2 G: 2 INJECTION, POWDER, FOR SOLUTION INTRAVENOUS at 08:24

## 2020-07-15 RX ADMIN — HYDRALAZINE HYDROCHLORIDE 50 MG: 25 TABLET, FILM COATED ORAL at 17:31

## 2020-07-15 RX ADMIN — TRAZODONE HYDROCHLORIDE 100 MG: 100 TABLET ORAL at 21:16

## 2020-07-15 RX ADMIN — SODIUM CHLORIDE 1250 MG: 9 INJECTION, SOLUTION INTRAVENOUS at 06:01

## 2020-07-15 RX ADMIN — PREGABALIN 150 MG: 75 CAPSULE ORAL at 08:24

## 2020-07-15 RX ADMIN — Medication: at 21:17

## 2020-07-15 RX ADMIN — Medication 10 ML: at 08:26

## 2020-07-15 RX ADMIN — RISPERIDONE 0.5 MG: 0.25 TABLET ORAL at 21:16

## 2020-07-15 RX ADMIN — OXCARBAZEPINE 150 MG: 150 TABLET, FILM COATED ORAL at 21:16

## 2020-07-15 RX ADMIN — METOPROLOL SUCCINATE 25 MG: 25 TABLET, EXTENDED RELEASE ORAL at 08:26

## 2020-07-15 RX ADMIN — Medication 10 ML: at 21:16

## 2020-07-15 RX ADMIN — HYDRALAZINE HYDROCHLORIDE 50 MG: 25 TABLET, FILM COATED ORAL at 08:25

## 2020-07-15 RX ADMIN — CLOPIDOGREL BISULFATE 75 MG: 75 TABLET ORAL at 08:26

## 2020-07-15 RX ADMIN — FENOFIBRATE 145 MG: 145 TABLET ORAL at 08:27

## 2020-07-15 RX ADMIN — CEFEPIME HYDROCHLORIDE 2 G: 2 INJECTION, POWDER, FOR SOLUTION INTRAVENOUS at 17:30

## 2020-07-15 RX ADMIN — DOCUSATE SODIUM 100 MG: 100 CAPSULE, LIQUID FILLED ORAL at 08:26

## 2020-07-15 RX ADMIN — OXCARBAZEPINE 300 MG: 150 TABLET, FILM COATED ORAL at 17:31

## 2020-07-15 RX ADMIN — CEFEPIME HYDROCHLORIDE 2 G: 2 INJECTION, POWDER, FOR SOLUTION INTRAVENOUS at 00:55

## 2020-07-15 NOTE — PROGRESS NOTES
Discharge Planning Assessment   [facility]     Patient Name: Emery Nesbitt  MRN: [id number]  Today's Date: 7/15/2020    Admit Date: 7/14/2020    Discharge Needs Assessment     Row Name 07/15/20 1439       Living Environment    Lives With  facility resident    Current Living Arrangements  extended care facility    Primary Care Provided by  self    Provides Primary Care For  no one, unable/limited ability to care for self    Able to Return to Prior Arrangements  yes       Resource/Environmental Concerns    Resource/Environmental Concerns  none    Transportation Concerns  car, none       Transition Planning    Patient/Family Anticipates Transition to  long term care facility    Patient/Family Anticipated Services at Transition  rehabilitation services    Transportation Anticipated  health plan transportation       Discharge Needs Assessment    Readmission Within the Last 30 Days  no previous admission in last 30 days    Concerns to be Addressed  discharge planning    Anticipated Changes Related to Illness  inability to care for self    Provided Post Acute Provider List?  N/A    N/A Provider List Comment  from [facility] and wants to return     Current Discharge Risk  chronically ill        Discharge Plan     Row Name 07/15/20 1439       Plan    Plan  From [facility] resident, will return skilled initially, confirmed return with pt/facility. No PASRR or precert required.     Patient/Family in Agreement with Plan  yes    Plan Comments  Met with patient at bedside at a distance greater than 6 feet with a mask, he informed he lives at [facility] and will return there on discharge. Per Smith CASTILLO per facility liasion patient will return under rehab.         Destination - Selection Complete      Service Provider Request Status Selected Services Address Phone Number Fax Number    [facility] [address] [facility] [facility] [place] [address] [phone]     Demographic Summary     Row Name  07/15/20 1438       General Information    Admission Type  inpatient    Arrived From  physician office    Required Notices Provided  Important Message from Medicare    Referral Source  admission list    Reason for Consult  discharge planning    Preferred Language  English     Used During This Interaction  no        Functional Status     Row Name 07/15/20 1438       Functional Status    Usual Activity Tolerance  moderate    Current Activity Tolerance  moderate       Functional Status, IADL    Medications  assistive person    Meal Preparation  assistive person    Housekeeping  assistive person    Laundry  assistive person    Shopping  assistive person          Patient Forms     Row Name 07/15/20 1441       Patient Forms    Important Message from Medicare (IMM)  -- IM delivered 7/14             Lamar Smart RN

## 2020-07-15 NOTE — PROGRESS NOTES
Name: Emery Nesbitt ADMIT: 2020   : 1960  PCP: Wang Johnson MD    MRN: [id number] LOS: 1 days   AGE/SEX: 60 y.o. male  ROOM: [address] [place] [address]    [place], Subsequent Hospital Care    No chief complaint on file.    CC: Right toe cellulitis and draining wound  Subjective     60 y.o. male resting comfortably in bed this a.m.  No new complaints.    Review of Systems  No shortness of air or chest pains.  Objective     Scheduled Medications:     !Vancomycin Level Draw Needed  Does not apply Once   [START ON 2020] !Vancomycin Level Draw Needed  Does not apply Once   amLODIPine 5 mg Oral Daily   atorvastatin 80 mg Oral Nightly   cefepime 2 g Intravenous Q8H   cetirizine 10 mg Oral Nightly   clopidogrel 75 mg Oral Daily   docusate sodium 100 mg Oral BID   DULoxetine 20 mg Oral Daily   fenofibrate 145 mg Oral Daily   hydrALAZINE 50 mg Oral TID   magnesium oxide 400 mg Oral Daily   metoprolol succinate XL 25 mg Oral Daily   OXcarbazepine 150 mg Oral Nightly   OXcarbazepine 300 mg Oral TID - Nitrates   pantoprazole 40 mg Oral Daily   pregabalin 150 mg Oral TID   risperiDONE 0.5 mg Oral BID   sodium chloride 10 mL Intravenous Q12H   sodium chloride 1 g Oral BID   Thera 1 tablet Oral Daily   traZODone 100 mg Oral Nightly   vancomycin 1,250 mg Intravenous Q12H       Active Infusions:    Pharmacy to dose vancomycin        As Needed Medications:  acetaminophen **OR** acetaminophen **OR** acetaminophen  •  hydrALAZINE  •  HYDROcodone-acetaminophen  •  magnesium sulfate **OR** magnesium sulfate in D5W 1g/100mL (PREMIX)  •  ondansetron **OR** ondansetron  •  Pharmacy to dose vancomycin  •  potassium chloride  •  sodium chloride    Vital Signs  Vital Signs Patient Vitals for the past 24 hrs:   BP Temp Temp src Pulse Resp SpO2 Height Weight   07/15/20 0423 123/77 98.5 °F (36.9 °C) Oral 60 18 95 % -- 79.8 kg (175 lb 14.8 oz)   20 144/81 98.6 °F (37 °C) Oral 72 18 96 % -- --   20 144/81 --  "-- 72 -- -- -- --   07/14/20 1654 167/79 -- -- 73 -- -- -- --   07/14/20 1104 162/84 97.7 °F (36.5 °C) Oral 66 17 99 % 175.3 cm (69\") 82 kg (180 lb 12.4 oz)     I/O:  I/O last 3 completed shifts:  In: 830 [P.O.:480; IV Piggyback:350]  Out: 2150 [Urine:2150]  BMI:  Body mass index is 25.98 kg/m².    Physical Exam:  Physical Exam  Great toe erythematous and swollen with some draining from wound.  Vital signs stable.    Results Review:     CBC    Results from last 7 days   Lab Units 07/15/20  0406 07/14/20  1216   WBC 10*3/mm3 6.00 7.50   HEMOGLOBIN g/dL 10.5* 11.0*   PLATELETS 10*3/mm3 157 157     BMP   Results from last 7 days   Lab Units 07/15/20  0406 07/14/20  1216   SODIUM mmol/L 132* 131*   POTASSIUM mmol/L 3.9 4.0   CHLORIDE mmol/L 96* 95*   CO2 mmol/L 26.0 26.0   BUN  12 16   CREATININE mg/dL 0.79 0.73*   GLUCOSE mg/dL 104* 80   MAGNESIUM mg/dL 1.2* 1.2*     HbA1C   Lab Results   Component Value Date    HGBA1C 5.4 07/14/2020     Infection     Radiology(recent) Xr Chest 1 View    Result Date: 7/14/2020  Old healed granulomatous disease. No acute process identified in the chest  Electronically Signed By-eMlo Sims On:7/14/2020 12:06 PM This report was finalized on [id number] by  Melo Sims, .    Mri Foot Right With & Without Contrast    Result Date: 7/14/2020  1.Osteomyelitis of the proximal phalanx of the great toe and proximal one half of the distal phalanx great toe with ulceration along the medial dorsal cortex. 2.No drainable fluid collection to suggest osteomyelitis. 3.Cellulitis of the great toe. Electronically Signed: Jessica Morales MD 7/14/2020 16:14 EDT      Assessment/Plan     Assessment & Plan      Diabetic foot ulcer (CMS/HCC)    Anemia    Peripheral vascular disease (CMS/HCC)    Hypertension    Hyperlipidemia    Diabetes mellitus (CMS/HCC)    Coronary artery disease    Bipolar affective disorder (CMS/HCC)    Cellulitis of right toe    Hyponatremia    Hypomagnesemia    Seasonal allergies    Anxiety " disorder    RLS (restless legs syndrome)    Peripheral neuropathy    History of CVA (cerebrovascular accident)      60 y.o. male with history of bilateral femoral-popliteal bypass and right great toe wound.    On CTA, bilateral femoropopliteal bypasses chronically occluded with collateral blood flow via profunda.  ABIs right 0.73 with TBI 0.47 and on the left 0.63 with TBI 0.5.  MRI demonstrates osteomyelitis of the proximal phalanx of the great toe and proximal one half of the distal phalanx great toe with ulceration along the medial dorsal cortex.    From a vascular standpoint, patient has adequate blood flow to heal great toe amputation, no revascularization plans however, if great toe amp fails to heal we will reconsider at that time.    Plans per podiatry.    We will see as needed.          Sheila Linn PA-C  07/15/20  07:28    Please call my office with any question: [phone]    Active Hospital Problems    Diagnosis  POA   • **Diabetic foot ulcer (CMS/HCC) [E11.621, L97.509]  Yes   • Cellulitis of right toe [L03.031]  Yes   • Hyponatremia [E87.1]  Yes   • Hypomagnesemia [E83.42]  Yes   • Seasonal allergies [J30.2]  Yes   • Anxiety disorder [F41.9]  Yes   • RLS (restless legs syndrome) [G25.81]  Yes   • Peripheral neuropathy [G62.9]  Yes   • History of CVA (cerebrovascular accident) [Z86.73]  Not Applicable   • Bipolar affective disorder (CMS/HCC) [F31.9]  Yes   • Peripheral vascular disease (CMS/HCC) [I73.9]  Yes   • Anemia [D64.9]  Yes   • Coronary artery disease [I25.10]  Yes   • Diabetes mellitus (CMS/HCC) [E11.9]  Yes   • Hyperlipidemia [E78.5]  Yes   • Hypertension [I10]  Yes      Resolved Hospital Problems   No resolved problems to display.

## 2020-07-15 NOTE — PROGRESS NOTES
"      [facility] Medicine Services Daily Progress Note      Hospitalist Team  LOS 1 days      Patient Care Team:  Wang Johnson MD as PCP - General  Sharath Conklin MD as Consulting Physician (Cardiology)    Patient Location: 305/1      Subjective   Subjective     Chief Complaint / Subjective  No chief complaint on file.  follow up great toe infection    No pain in the toe and no fever otherwise no new issues.    Brief Synopsis of Hospital Course/HPI  Mr. Nesbitt is a 60 y.o. male with a past medical history of CAD, hypertension, hyperlipidemia, PVD status post bilateral bypass grafts, CVA, bipolar, anemia, and diabetes mellitus type 2 who was directly admitted to [facility] by vascular due to patient having a right great toe wound/cellulitis.  Hospitalist accepted the patient for further care and management.  Patient states he developed an ulcer on his right big toe 2 months ago.  He states it was due to his toe rubbing on his shoe.  He has been following up with Dr. Garcia outpatient and was given a new pair shoes.  Today he followed up with Dr. Olson and patient was found to have right great toe cellulitis.  Patient denies any pain.  He denies any recent nausea, vomiting, diarrhea, fever, chills, cough, shortness of breath, or chest pain.  He currently resides at [facility].        Review of Systems   Constitution: Negative for fever.   Skin: Positive for poor wound healing.         Objective   Objective      Vital Signs  Temp:  [98.5 °F (36.9 °C)-98.6 °F (37 °C)] 98.5 °F (36.9 °C)  Heart Rate:  [60-73] 60  Resp:  [18] 18  BP: (123-167)/(77-81) 123/77  Oxygen Therapy  SpO2: 95 %  Pulse Oximetry Type: Intermittent  Device (Oxygen Therapy): room air  Flowsheet Rows      First Filed Value   Admission Height  175.3 cm (69\") Documented at 07/14/2020 1104   Admission Weight  82 kg (180 lb 12.4 oz) Documented at 07/14/2020 1104        Intake & Output (last 3 days)       " 07/12 0701 - 07/13 0700 07/13 0701 - 07/14 0700 07/14 0701 - 07/15 0700 07/15 0701 - 07/16 0700    P.O.   480     IV Piggyback   350     Total Intake(mL/kg)   830 (10.4)     Urine (mL/kg/hr)   2150     Total Output   2150     Net   -1320                 Lines, Drains & Airways    Active LDAs     Name:   Placement date:   Placement time:   Site:   Days:    Peripheral IV 07/14/20 1415 Anterior;Right Forearm   07/14/20    1415    Forearm   less than 1                  Physical Exam:    Physical Exam   Constitutional: He is oriented to person, place, and time. No distress.   HENT:   Head: Normocephalic.   Eyes: Pupils are equal, round, and reactive to light.   Cardiovascular: Normal rate and regular rhythm.   Pulmonary/Chest: Effort normal. No respiratory distress.   Abdominal: Soft. He exhibits no distension.   Musculoskeletal:   Erythema, slight swelling and open wound on right great toe with bloody drainage     Neurological: He is alert and oriented to person, place, and time.   Skin: Skin is warm and dry.   Psychiatric: He has a normal mood and affect.   Vitals reviewed.            Procedures:              Results Review:     I reviewed the patient's new clinical results.      Lab Results (last 24 hours)     Procedure Component Value Units Date/Time    Wound Culture - Wound, Foot, Right [[id number]]  (Abnormal) Collected:  07/14/20 1242    Specimen:  Wound from Foot, Right Updated:  07/15/20 1145     Wound Culture Light growth (2+) Corynebacterium species     Comment:   No definitive guidelines. All are susceptible to vancomycin. Resistance to penicillins & cephalosporins does occur.        Gram Stain Few (2+) WBCs per low power field      No organisms seen    BUN [[id number]]  (Normal) Collected:  07/15/20 0406    Specimen:  Blood Updated:  07/15/20 0711     BUN 12 mg/dL     Magnesium [[id number]]  (Abnormal) Collected:  07/15/20 0406    Specimen:  Blood Updated:  07/15/20 0451     Magnesium 1.2 mg/dL      Comprehensive Metabolic Panel [[id number]]  (Abnormal) Collected:  07/15/20 0406    Specimen:  Blood Updated:  07/15/20 0451     Glucose 104 mg/dL      BUN --     Comment: Testing performed by alternate method        Creatinine 0.79 mg/dL      Sodium 132 mmol/L      Potassium 3.9 mmol/L      Chloride 96 mmol/L      CO2 26.0 mmol/L      Calcium 9.2 mg/dL      Total Protein 5.2 g/dL      Albumin 3.60 g/dL      ALT (SGPT) 17 U/L      AST (SGOT) 14 U/L      Alkaline Phosphatase 37 U/L      Total Bilirubin 0.2 mg/dL      eGFR Non African Amer 99 mL/min/1.73      Globulin 1.6 gm/dL      A/G Ratio 2.3 g/dL      BUN/Creatinine Ratio --     Comment: Testing not performed        Anion Gap 10.0 mmol/L     Narrative:       GFR Normal >60  Chronic Kidney Disease <60  Kidney Failure <15      CBC Auto Differential [[id number]]  (Abnormal) Collected:  07/15/20 0406    Specimen:  Blood Updated:  07/15/20 0428     WBC 6.00 10*3/mm3      RBC 3.55 10*6/mm3      Hemoglobin 10.5 g/dL      Hematocrit 30.4 %      MCV 85.7 fL      MCH 29.5 pg      MCHC 34.4 g/dL      RDW 16.8 %      RDW-SD 50.3 fl      MPV 9.4 fL      Platelets 157 10*3/mm3      Neutrophil % 59.9 %      Lymphocyte % 25.0 %      Monocyte % 12.7 %      Eosinophil % 1.8 %      Basophil % 0.6 %      Neutrophils, Absolute 3.60 10*3/mm3      Lymphocytes, Absolute 1.50 10*3/mm3      Monocytes, Absolute 0.80 10*3/mm3      Eosinophils, Absolute 0.10 10*3/mm3      Basophils, Absolute 0.00 10*3/mm3      nRBC 0.0 /100 WBC     Hemoglobin A1c [[id number]]  (Normal) Collected:  07/14/20 1216    Specimen:  Blood Updated:  07/14/20 1649     Hemoglobin A1C 5.4 %     Narrative:       Hemoglobin A1C Reference Range:    <5.7 %        Normal  5.7-6.4 %     Increased risk for diabetes  > 6.4 %        Diabetes       These guidelines have been recommended by the American Diabetic Association for Hgb A1c.      The following 2010 guidelines have been recommended by the American Diabetes Association for  Hemoglobin A1c.    HBA1c 5.7-6.4% Increased risk for future diabetes (pre-diabetes)  HBA1c     >6.4% Diabetes          Hemoglobin A1C   Date Value Ref Range Status   07/14/2020 5.4 3.5 - 5.6 % Final               No results found for: LIPASE  No results found for: CHOL, CHLPL, TRIG, HDL, LDL, LDLDIRECT    No results found for: INTRAOP, PREDX, FINALDX, COMDX    Microbiology Results (last 10 days)     Procedure Component Value - Date/Time    Wound Culture - Wound, Foot, Right [[id number]]  (Abnormal) Collected:  07/14/20 1242    Lab Status:  Preliminary result Specimen:  Wound from Foot, Right Updated:  07/15/20 1145     Wound Culture Light growth (2+) Corynebacterium species     Comment:   No definitive guidelines. All are susceptible to vancomycin. Resistance to penicillins & cephalosporins does occur.        Gram Stain Few (2+) WBCs per low power field      No organisms seen          ECG/EMG Results (most recent)     Procedure Component Value Units Date/Time    ECG 12 Lead [[id number]] Collected:  07/14/20 1155     Updated:  07/14/20 1157    Narrative:       HEART RATE= 62  bpm  RR Interval= 968  ms  MO Interval= 77  ms  P Horizontal Axis=   deg  P Front Axis= 0  deg  QRSD Interval= 95  ms  QT Interval= 368  ms  QRS Axis= 37  deg  T Wave Axis= 5  deg  - NORMAL ECG -  Sinus rhythm  When compared with ECG of 29-Aug-2015 3:16:38,  No significant change  Electronically Signed By:   Date and Time of Study: 2020-07-14 11:55:26          Results for orders placed during the hospital encounter of 07/14/20   Doppler Ankle Brachial Index Single Level CAR    Narrative · Right Conclusion: The right CESAR is moderately reduced. Mild digital   ischemia.  · Left Conclusion: The left CESAR is moderately reduced. Mild digital   ischemia.               Ct Angio Abdominal Aorta Bilateral Iliofem Runoff    Result Date: 7/15/2020   1. No aortoiliac occlusive disease. 2. It appears that the patient's had previous femoral popliteal bypass graft  placement. The grafts are not seen well, but are clearly chronically occluded. There is also chronic occlusion of the native superficial femoral arteries and popliteal arteries as described above. 3. There is a infrapopliteal small vessel disease, left greater than right side. 4. Additional vascular and nonvascular findings as noted above.  Electronically Signed By-Noam Soares On:7/15/2020 10:50 AM This report was finalized on [id number] by  Noam Soares, .    Xr Chest 1 View    Result Date: 7/14/2020  Old healed granulomatous disease. No acute process identified in the chest  Electronically Signed By-Melo Sims On:7/14/2020 12:06 PM This report was finalized on [id number] by  Melo Sims, .    Mri Foot Right With & Without Contrast    Result Date: 7/14/2020  1.Osteomyelitis of the proximal phalanx of the great toe and proximal one half of the distal phalanx great toe with ulceration along the medial dorsal cortex. 2.No drainable fluid collection to suggest osteomyelitis. 3.Cellulitis of the great toe. Electronically Signed: Jessica Morales MD 7/14/2020 16:14 EDT          Xrays, labs reviewed personally by physician.    Medication Review:   I have reviewed the patient's current medication list      Scheduled Meds    !Vancomycin Level Draw Needed  Does not apply Once   [START ON 7/16/2020] !Vancomycin Level Draw Needed  Does not apply Once   amLODIPine 5 mg Oral Daily   atorvastatin 80 mg Oral Nightly   cefepime 2 g Intravenous Q8H   cetirizine 10 mg Oral Nightly   clopidogrel 75 mg Oral Daily   docusate sodium 100 mg Oral BID   DULoxetine 20 mg Oral Daily   fenofibrate 145 mg Oral Daily   hydrALAZINE 50 mg Oral TID   magnesium oxide 400 mg Oral Daily   metoprolol succinate XL 25 mg Oral Daily   OXcarbazepine 150 mg Oral Nightly   OXcarbazepine 300 mg Oral TID - Nitrates   pantoprazole 40 mg Oral Daily   pregabalin 150 mg Oral TID   risperiDONE 0.5 mg Oral BID   sodium chloride 10 mL Intravenous Q12H   sodium  chloride 1 g Oral BID   Thera 1 tablet Oral Daily   traZODone 100 mg Oral Nightly   vancomycin 1,250 mg Intravenous Q12H       Meds Infusions    Pharmacy to dose vancomycin        Meds PRN  acetaminophen **OR** acetaminophen **OR** acetaminophen  •  hydrALAZINE  •  HYDROcodone-acetaminophen  •  magnesium sulfate **OR** magnesium sulfate in D5W 1g/100mL (PREMIX)  •  ondansetron **OR** ondansetron  •  Pharmacy to dose vancomycin  •  potassium chloride  •  sodium chloride        Assessment/Plan   Assessment/Plan     Active Hospital Problems    Diagnosis  POA   • **Diabetic foot ulcer (CMS/HCC) [E11.621, L97.509]  Yes   • Cellulitis of right toe [L03.031]  Yes   • Hyponatremia [E87.1]  Yes   • Hypomagnesemia [E83.42]  Yes   • Seasonal allergies [J30.2]  Yes   • Anxiety disorder [F41.9]  Yes   • RLS (restless legs syndrome) [G25.81]  Yes   • Peripheral neuropathy [G62.9]  Yes   • History of CVA (cerebrovascular accident) [Z86.73]  Not Applicable   • Bipolar affective disorder (CMS/HCC) [F31.9]  Yes   • Peripheral vascular disease (CMS/HCC) [I73.9]  Yes   • Anemia [D64.9]  Yes   • Coronary artery disease [I25.10]  Yes   • Diabetes mellitus (CMS/HCC) [E11.9]  Yes   • Hyperlipidemia [E78.5]  Yes   • Hypertension [I10]  Yes      Resolved Hospital Problems   No resolved problems to display.       MEDICAL DECISION MAKING COMPLEXITY BY PROBLEM:     Right great toe osteomyelitis  -MRI and CTA lower extremity rewiewed  -Wound Cx growing Corynebacterium spp  -on Vanc and cefepime  -Podiatry plans for likely amputation in coming days  -ID following  -vascular following, no plans for re-vascularization     Femoral-popliteal bypasses chronically occluded bilaterally with collateral circulation  -vascular plans as noted above  -on trental     Diabetes Mellitus Type II with peripheral neuropathy   -Start sliding scale, Accuchecks   -Check hbgA1C   -Holding home metformin   -Continue lyrica (INSPECT verified)     CAD   -Continue Plavix,  metoprolol , and statin     HLD   -Continue statin and tricor     Essential HTN   -Monitor blood pressure   -Uncontrolled   -Continue hydralazine and amlodipine  -IV hydralazine ordered PRN     VTE Prophylaxis -   Mechanical Order History:      Ordered        07/14/20 1157  Place Sequential Compression Device  Once         07/14/20 1157  Maintain Sequential Compression Device  Continuous                 Pharmalogical Order History:     None            Code Status -   Code Status and Medical Interventions:   Ordered at: 07/14/20 1157     Level Of Support Discussed With:    Patient     Code Status:    CPR     Medical Interventions (Level of Support Prior to Arrest):    Full       This patient has been examined wearing appropriate Personal Protective Equipment. 07/15/20        Discharge Planning    pending amputation      Destination      Coordination has not been started for this encounter.      Durable Medical Equipment      Coordination has not been started for this encounter.      Dialysis/Infusion      Coordination has not been started for this encounter.      Home Medical Care      Coordination has not been started for this encounter.      Therapy      Coordination has not been started for this encounter.      Community Resources      Coordination has not been started for this encounter.            Electronically signed by Jac Sheikh DO, 07/15/20, 14:03.  [facility] Hospitalist Team

## 2020-07-15 NOTE — CONSULTS
Infectious Diseases Consult Note    Referring Provider: Jac Sheikh DO    Reason for Consultation: Right big toe osteomyelitis    Patient Care Team:  Wang Johnson MD as PCP - General  Sharath Conklin MD as Consulting Physician (Cardiology)    Chief complaint right big toe wound    Subjective     History of present illness:      This is 60-year-old white male with past medical history significant for peripheral vascular disease and borderline diabetes.  The patient presented to the hospital on July 14, 2020.  He was complaining of right big toe wound which he had for almost a month.  The patient denied having any other complaints.  The patient is hemodynamically stable.  The patient wound cultures growing Corynebacterium species so far.  The patient is currently on IV vancomycin and IV cefepime.  The patient was seen and evaluated by vascular surgery service and patient was felt to have good blood flow to heal amputation of the right big toe.  The patient has been seen by podiatry service and there is plan for right big toe amputation on Friday.    Review of Systems   Review of Systems   Constitutional: Negative.    HENT: Negative.    Eyes: Negative.    Respiratory: Negative.    Cardiovascular: Negative.    Gastrointestinal: Negative.    Genitourinary: Negative.    Musculoskeletal: Negative.    Skin: Positive for wound.   Neurological: Negative.    Hematological: Negative.    Psychiatric/Behavioral: Negative.        Medications  Medications Prior to Admission   Medication Sig Dispense Refill Last Dose   • acetaminophen (TYLENOL) 325 MG tablet Take 650 mg by mouth Every 6 (Six) Hours As Needed.   Past Month at Unknown time   • amLODIPine (NORVASC) 5 MG tablet Take 5 mg by mouth Daily.   7/14/2020 at Unknown time   • atorvastatin (LIPITOR) 80 MG tablet Take 80 mg by mouth Every Night.   7/13/2020 at Unknown time   • cetirizine (zyrTEC) 10 MG tablet Take 10 mg by mouth Every Night.   7/13/2020 at Unknown time   •  CloNIDine (CATAPRES) 0.2 MG tablet Take 0.1 mg by mouth 3 (Three) Times a Day As Needed.   Past Week at Unknown time   • clopidogrel (PLAVIX) 75 MG tablet Take 75 mg by mouth Daily.   7/14/2020 at Unknown time   • dicyclomine (BENTYL) 10 MG capsule Take 10 mg by mouth Every 6 (Six) Hours As Needed.      • docusate sodium (COLACE) 100 MG capsule Take 100 mg by mouth 2 (Two) Times a Day.   7/14/2020 at Unknown time   • DULoxetine (CYMBALTA) 20 MG capsule Take 20 mg by mouth Daily.   7/14/2020 at Unknown time   • fenofibrate (TRICOR) 145 MG tablet Take 145 mg by mouth Daily.   7/14/2020 at Unknown time   • hydrALAZINE (APRESOLINE) 50 MG tablet 3 (Three) Times a Day.   7/14/2020 at Unknown time   • HYDROcodone-acetaminophen (NORCO)  MG per tablet Take 1 tablet by mouth Every 6 (Six) Hours As Needed.   7/14/2020 at Unknown time   • loperamide (IMODIUM) 2 MG capsule Take 2 mg by mouth 4 (Four) Times a Day As Needed for Diarrhea.   Past Month at Unknown time   • metFORMIN (GLUCOPHAGE) 500 MG tablet Take 500 mg by mouth 2 (Two) Times a Day With Meals.   7/14/2020 at Unknown time   • metoprolol succinate XL (TOPROL-XL) 25 MG 24 hr tablet Take 25 mg by mouth Daily.   7/14/2020 at Unknown time   • Multiple Vitamins-Minerals (MULTIVITAMIN WITH MINERALS) tablet tablet Take 1 tablet by mouth Daily.   7/13/2020 at Unknown time   • nitroglycerin (NITROLINGUAL) 0.4 MG/SPRAY spray NITROLINGUAL 0.4 MG/SPRAY SOLN   Past Month at Unknown time   • OXcarbazepine (TRILEPTAL) 300 MG tablet Take 150 mg by mouth Every Night.   7/13/2020 at Unknown time   • OXcarbazepine (TRILEPTAL) 300 MG tablet Take 300 mg by mouth 3 (Three) Times a Day.   7/14/2020 at Unknown time   • pantoprazole (PROTONIX) 40 MG EC tablet Take 40 mg by mouth Daily.   7/14/2020 at Unknown time   • predniSONE (DELTASONE) 5 MG tablet Take 5 mg by mouth Daily.   7/14/2020 at Unknown time   • pregabalin (LYRICA) 50 MG capsule 150 mg 3 (Three) Times a Day.   7/14/2020 at  Unknown time   • promethazine (PHENERGAN) 12.5 MG tablet Take 12.5 mg by mouth Every 6 (Six) Hours As Needed for Nausea or Vomiting.      • risperiDONE (risperDAL) 0.5 MG tablet Take 0.5 mg by mouth 2 (Two) Times a Day.   7/14/2020 at Unknown time   • sodium chloride 1 g tablet Take 1 g by mouth 2 (two) times a day.   7/14/2020 at Unknown time   • traZODone (DESYREL) 50 MG tablet Take 100 mg by mouth Every Night.   7/13/2020 at Unknown time   • Chlorpheniramine-Acetaminophen (CORICIDIN) 2-325 MG tablet Take  by mouth.   Not Taking   • magnesium oxide (MAGNESIUM-OXIDE) 400 (241.3 Mg) MG tablet tablet MAGNESIUM-OXIDE 400 (241.3 Mg) MG TABS   Taking       History  Past Medical History:   Diagnosis Date   • Bipolar disorder (CMS/[facility]) 07/14/2020   • Cellulitis 07/14/2020   • Coronary artery disease    • CVA (cerebral vascular accident) (CMS/[facility])    • Diabetes mellitus (CMS/[facility])    • Hyperlipidemia    • Hypertension    • Neuropathy    • Peripheral vascular disease (CMS/[facility])    • Schizophrenia (CMS/[facility])      Past Surgical History:   Procedure Laterality Date   • CARDIAC CATHETERIZATION     • CORONARY ANGIOPLASTY         Family History  Family History   Adopted: Yes       Social History   reports that he quit smoking about 4 years ago. He has never used smokeless tobacco. He reports that he does not drink alcohol or use drugs.    Allergies  Patient has no known allergies.    Objective     Vital Signs   Vital Signs (last 24 hours)       07/14 0700  -  07/15 0659 07/15 0700  -  07/15 1532   Most Recent    Temp (°F) 97.7 -  98.6      98.6     98.6 (37)    Heart Rate 60 -  73      67     67    Resp 17 -  18      17     17    /77 -  167/79      127/72     127/72    SpO2 (%) 95 -  99      95     95          Physical Exam:  Physical Exam   Constitutional: He is oriented to person, place, and time. He appears well-developed and well-nourished.   HENT:   Head: Normocephalic and atraumatic.   Eyes: Pupils are equal, round, and  reactive to light. EOM are normal.   Neck: Normal range of motion. Neck supple.   Cardiovascular: Normal rate, regular rhythm and normal heart sounds.   Pulmonary/Chest: Effort normal and breath sounds normal. No respiratory distress. He has no wheezes. He has no rales.   Abdominal: Soft. Bowel sounds are normal. He exhibits no distension and no mass. There is no tenderness. There is no rebound and no guarding.   Musculoskeletal: Normal range of motion. He exhibits no edema or deformity.   Neurological: He is alert and oriented to person, place, and time. No cranial nerve deficit.   Skin: Skin is warm. No rash noted. No erythema.   Psychiatric: He has a normal mood and affect.   Nursing note and vitals reviewed.      Microbiology  Microbiology Results (last 10 days)     Procedure Component Value - Date/Time    Wound Culture - Wound, Foot, Right [[id number]]  (Abnormal) Collected:  07/14/20 1242    Lab Status:  Preliminary result Specimen:  Wound from Foot, Right Updated:  07/15/20 1145     Wound Culture Light growth (2+) Corynebacterium species     Comment:   No definitive guidelines. All are susceptible to vancomycin. Resistance to penicillins & cephalosporins does occur.        Gram Stain Few (2+) WBCs per low power field      No organisms seen          Laboratory  Results from last 7 days   Lab Units 07/15/20  0406   WBC 10*3/mm3 6.00   HEMOGLOBIN g/dL 10.5*   HEMATOCRIT % 30.4*   PLATELETS 10*3/mm3 157     Results from last 7 days   Lab Units 07/15/20  0406   SODIUM mmol/L 132*   POTASSIUM mmol/L 3.9   CHLORIDE mmol/L 96*   CO2 mmol/L 26.0   BUN  12   CREATININE mg/dL 0.79   GLUCOSE mg/dL 104*   CALCIUM mg/dL 9.2     Results from last 7 days   Lab Units 07/15/20  0406   SODIUM mmol/L 132*   POTASSIUM mmol/L 3.9   CHLORIDE mmol/L 96*   CO2 mmol/L 26.0   BUN  12   CREATININE mg/dL 0.79   GLUCOSE mg/dL 104*   CALCIUM mg/dL 9.2         Results from last 7 days   Lab Units 07/14/20  1216   SED RATE mm/hr 10                      Radiology  Imaging Results (Last 72 Hours)     Procedure Component Value Units Date/Time    CT Angio Abdominal Aorta Bilateral Iliofem Runoff [[id number]] Collected:  07/15/20 1026     Updated:  07/15/20 1052    Narrative:          DATE OF EXAM:  7/14/2020 4:08 PM     PROCEDURE:  CT ANGIO ABDOMINAL AORTA BILAT ILIOFEM RUNOFF-     INDICATIONS:   Claudication, diabetes mellitus, right great toe ulcer, evaluate for  surgical revascularization.     COMPARISON:   MRI right foot performed on 7/14/2020.     TECHNIQUE:  Routine transaxial slices were obtained through the abdomen, pelvis, and  both lower extremities after the intravenous administration of 100 mL  Isovue 370. Reconstructed coronal and sagittal images were also  obtained. In addition, a 3 D volume rendered image was obtained after  post processing. Automated exposure control and iterative reconstruction  methods were used.     FINDINGS:  Vascular findings: There is diffuse calcified atheromatous plaque  throughout the abdomen and pelvis. There is no abdominal aortic aneurysm  or dissection. There is no significant aortoiliac occlusive disease. The  celiac artery is patent. The superior mesenteric artery is also patent.  There is a single patent right renal artery and there are two patent  left renal arteries. The inferior mesenteric artery is chronically  occluded at its origin. There is ectasia of the right common femoral  artery with surgery clips indicating previous bypass graft placement.  The actual bypass graft is not seen well but is occluded. There is also  chronic occlusion of the native right superficial femoral and proximal  popliteal arteries. The right profunda femoral artery is patent. There  are numerous collaterals which reconstitute flow into the mid right  popliteal artery. There is heavily calcified plaque in the in the right  tibial peroneal trunk and throughout most of the right posterior tibial  artery. There is also heavily  calcified plaque in the proximal right  anterior tibial artery. There are focal high-grade stenotic areas in the  region of the heavily calcified plaque. There is visualization of the  right posterior tibial artery in the foot indicating patency. The right  anterior tibial artery terminates just below the ankle joint with most  of the right dorsalis pedis artery chronically occluded. There is  ectasia of the left common femoral artery with adjacent surgical clips  indicating previous bypass graft placement. The actual bypass graft is  not seen well but is occluded. There is also chronic occlusion of the  native left superficial femoral and a popliteal arteries. The patient  has a patent left profunda femoral artery with multiple collateral  vessels throughout the left thigh, knee, and proximal calf. The patient  has severe infrapopliteal small vessel disease in the left lower  extremity. The proximal tibial peroneal trunk is occluded secondary to  calcified plaque. The proximal left anterior tibial artery is also  occluded due to calcified plaque. The left posterior tibial artery is  chronically occluded throughout the calf. There is reconstitution of  flow within the proximal left anterior tibial and peroneal arteries via  small collaterals. There is reconstitution of flow of the left posterior  tibial artery in the hindfoot via a collateral branch from the distal  peroneal artery. The left anterior tibial artery terminates just below  the ankle joint with only a short segment of the left dorsalis pedis  artery visualized. The remaining left dorsalis pedis artery is  chronically occluded.     Nonvascular findings: There are calcified granulomas in the right lower  lobe. There is increased stool throughout the colon and rectum  indicating fecal impaction. There are no acute findings within the  abdomen or pelvis. The patient has a right great toe ulcer. The patient  had an abnormal MRI of the right foot indicating  osteomyelitis of the  proximal phalanx and distal phalanx. This is not seen well on the CT  exam. There are no definite osteolytic or sclerotic lesions within the  bony structures. The patient has an old healed fracture of the left  inferior pubic ramus. There are old healed L2 and L4 compression  fractures.          Impression:          1. No aortoiliac occlusive disease.  2. It appears that the patient's had previous femoral popliteal bypass  graft placement. The grafts are not seen well, but are clearly  chronically occluded. There is also chronic occlusion of the native  superficial femoral arteries and popliteal arteries as described above.  3. There is a infrapopliteal small vessel disease, left greater than  right side.  4. Additional vascular and nonvascular findings as noted above.     Electronically Signed By-Noam Soares On:7/15/2020 10:50 AM  This report was finalized on [id number] by  Noam Soares, .    MRI Foot Right With & Without Contrast [[id number]] Collected:  07/14/20 1609     Updated:  07/14/20 1616    Narrative:       MRI FOOT RIGHT W WO CONTRAST    Date of Exam: 7/14/2020 15:10 EDT    Indication: Osteomyelitis suspected, foot swelling, diabetic.  Diabetic ulcer dorsal right great toe.     Comparison Exams: None available.    Technique: Prior to and after uneventful administration of 16 cc ProHance IV contrast, multiplanar multisequence images of the foot performed according to routine foot MRI protocol.    FINDINGS:  There is homogeneous low T1 high T2 signal intensity involving the distal one half of the proximal phalanx of the first digit. This involves the distal 1.8 cm. There is some possible loss of cortex along the medial aspect of the distal proximal phalanx.   There is an ulceration along the medial dorsal aspect of the toe in this location. This is consistent with osteomyelitis. There is more heterogeneous low T1 high T2 signal intensity involving the base of the distal phalanx of the  great toe measuring   about 7 mm from proximal to distal. No drainable fluid collection to suggest abscess is seen. There is diffuse subcutaneous edema of the foot more pronounced lateral than medial.    Mild muscular atrophy is present. No intermetatarsal masses are seen. Mild joint space narrowing first metatarsophalangeal joint. Hallux sesamoid bones have normal size, position and signal intensity. There is increased enhancement of the marrow   involving the proximal phalanx and base of distal phalanx of the great toe. The remainder of the enhancement is physiologic.      Impression:       1.Osteomyelitis of the proximal phalanx of the great toe and proximal one half of the distal phalanx great toe with ulceration along the medial dorsal cortex.  2.No drainable fluid collection to suggest osteomyelitis.  3.Cellulitis of the great toe.    Electronically Signed: Jessica Morales MD 7/14/2020 16:14 EDT    XR Chest 1 View [[id number]] Collected:  07/14/20 1206     Updated:  07/14/20 1208    Narrative:       DATE OF EXAM:  7/14/2020 11:51 AM     PROCEDURE:  XR CHEST 1 VW-     INDICATIONS:  new admit     COMPARISON:  No Comparisons Available     TECHNIQUE:   Single radiographic AP view of the chest was obtained.     FINDINGS:  The heart size appears normal. Lungs show granulomatous calcifications  in the right base. No acute infiltrates are identified.        Impression:       Old healed granulomatous disease. No acute process identified in the  chest     Electronically Signed By-Melo Sims On:7/14/2020 12:06 PM  This report was finalized on [id number] by  Melo Sims, .          Cardiology      Results Review:  I have reviewed all clinical data, test, lab, and imaging results.       Schedule Meds    !Vancomycin Level Draw Needed  Does not apply Once   [START ON 7/16/2020] !Vancomycin Level Draw Needed  Does not apply Once   amLODIPine 5 mg Oral Daily   atorvastatin 80 mg Oral Nightly   cefepime 2 g Intravenous Q8H      cetirizine 10 mg Oral Nightly   clopidogrel 75 mg Oral Daily   docusate sodium 100 mg Oral BID   DULoxetine 20 mg Oral Daily   fenofibrate 145 mg Oral Daily   hydrALAZINE 50 mg Oral TID   magnesium oxide 400 mg Oral Daily   metoprolol succinate XL 25 mg Oral Daily   OXcarbazepine 150 mg Oral Nightly   OXcarbazepine 300 mg Oral TID - Nitrates   pantoprazole 40 mg Oral Daily   pregabalin 150 mg Oral TID   risperiDONE 0.5 mg Oral BID   sodium chloride 10 mL Intravenous Q12H   sodium chloride 1 g Oral BID   Thera 1 tablet Oral Daily   traZODone 100 mg Oral Nightly   vancomycin 1,250 mg Intravenous Q12H       Infusion Meds    Pharmacy to dose vancomycin        PRN Meds  acetaminophen **OR** acetaminophen **OR** acetaminophen  •  hydrALAZINE  •  HYDROcodone-acetaminophen  •  magnesium sulfate **OR** magnesium sulfate in D5W 1g/100mL (PREMIX)  •  ondansetron **OR** ondansetron  •  Pharmacy to dose vancomycin  •  potassium chloride  •  sodium chloride      Assessment/Plan       Assessment    Right diabetic foot with osteomyelitis of the right big toe involving the proximal and the distal phalanx.  Wound cultures are growing Corynebacterium    Diabetes mellitus with diabetic neuropathy    Peripheral vascular disease.  Patient status post bilateral leg bypass surgery few years ago.  The patient is being followed by vascular surgery service and was felt to have good blood flow to heal amputation of the toe    Plan    Continue IV vancomycin and IV cefepime for now waiting on the final wound culture results  The patient is scheduled for right big toe amputation on Friday  Labs in a.m.    Marcia Zhu MD  07/15/20  15:32      Note is dictated utilizing voice recognition software/Dragon

## 2020-07-15 NOTE — DISCHARGE PLACEMENT REQUEST
"Pamella craig (60 y.o. Male)     Date of Birth Social Security Number Address Home Phone MRN    1960  [id number] [place] [address] [phone] [id number]    [place] Marital Status          None Single       Admission Date Admission Type Admitting Provider Attending Provider Department, Room/Bed    7/14/20 Urgent Jac Sheikh DO Riddle, Lee M, DO [facility] 3A MEDICAL INPATIENT, 305/1    Discharge Date Discharge Disposition Discharge Destination                       Attending Provider:  Jac Sheikh DO    Allergies:  No Known Allergies    Isolation:  None   Infection:  None   Code Status:  CPR    Ht:  175.3 cm (69\")   Wt:  79.8 kg (175 lb 14.8 oz)    Admission Cmt:  None   Principal Problem:  Diabetic foot ulcer (CMS/[facility]) [E11.621,L97.509]                 Active Insurance as of 7/14/2020     Primary Coverage     Payor Plan Insurance Group Employer/Plan Group    MEDICARE MEDICARE A & B      Payor Plan Address Payor Plan Phone Number Payor Plan Fax Number Effective Dates    [address] [phone]  10/1/2004 - None Entered    [facility] [id number]       Subscriber Name Subscriber Birth Date Member ID       PAMELLA CASTILLO 1960 [id number]           Secondary Coverage     Payor Plan Insurance Group Employer/Plan Group    [place]      Payor Plan Address Payor Plan Phone Number Payor Plan Fax Number Effective Dates    [address]   7/1/2019 - None Entered    [place] [address]       Subscriber Name Subscriber Birth Date Member ID       PAMELLA CASTILLO 1960 [id number]                 Emergency Contacts      (Rel.) Home Phone Work Phone Mobile Phone    JUSTIN ACOSTA (Sister) -- -- [phone]              "

## 2020-07-15 NOTE — PROGRESS NOTES
07/15/20   Foot and Ankle Surgery - Inpatient Follow-up  Provider: Dr. Will Garcia DPM    Chief Complaint: Ulcer right great toe    Subjective:  Emery Nesbitt is a 60 y.o. male.  With a ulceration that occurred secondary to his shoe rubbing against it.  Patient has done antibiotics offloading and wound care with no long-term success.  History of previous revascularization ABIs done at [facility] showed monophasic waveform.  ABIs were repeated here at the hospital which did show good pressures and an CESAR that appears to be adequate for healing.  No numbers were calculated a [facility] secondary to vascular graft the report states        No Known Allergies    No current facility-administered medications on file prior to encounter.      Current Outpatient Medications on File Prior to Encounter   Medication Sig Dispense Refill   • acetaminophen (TYLENOL) 325 MG tablet Take 650 mg by mouth Every 6 (Six) Hours As Needed.     • amLODIPine (NORVASC) 5 MG tablet Take 5 mg by mouth Daily.     • atorvastatin (LIPITOR) 80 MG tablet Take 80 mg by mouth Every Night.     • cetirizine (zyrTEC) 10 MG tablet Take 10 mg by mouth Every Night.     • CloNIDine (CATAPRES) 0.2 MG tablet Take 0.1 mg by mouth 3 (Three) Times a Day As Needed.     • clopidogrel (PLAVIX) 75 MG tablet Take 75 mg by mouth Daily.     • dicyclomine (BENTYL) 10 MG capsule Take 10 mg by mouth Every 6 (Six) Hours As Needed.     • docusate sodium (COLACE) 100 MG capsule Take 100 mg by mouth 2 (Two) Times a Day.     • DULoxetine (CYMBALTA) 20 MG capsule Take 20 mg by mouth Daily.     • fenofibrate (TRICOR) 145 MG tablet Take 145 mg by mouth Daily.     • hydrALAZINE (APRESOLINE) 50 MG tablet 3 (Three) Times a Day.     • HYDROcodone-acetaminophen (NORCO)  MG per tablet Take 1 tablet by mouth Every 6 (Six) Hours As Needed.     • loperamide (IMODIUM) 2 MG capsule Take 2 mg by mouth 4 (Four) Times a Day As Needed for Diarrhea.     • metFORMIN (GLUCOPHAGE) 500 MG tablet  "Take 500 mg by mouth 2 (Two) Times a Day With Meals.     • metoprolol succinate XL (TOPROL-XL) 25 MG 24 hr tablet Take 25 mg by mouth Daily.     • Multiple Vitamins-Minerals (MULTIVITAMIN WITH MINERALS) tablet tablet Take 1 tablet by mouth Daily.     • nitroglycerin (NITROLINGUAL) 0.4 MG/SPRAY spray NITROLINGUAL 0.4 MG/SPRAY SOLN     • OXcarbazepine (TRILEPTAL) 300 MG tablet Take 150 mg by mouth Every Night.     • OXcarbazepine (TRILEPTAL) 300 MG tablet Take 300 mg by mouth 3 (Three) Times a Day.     • pantoprazole (PROTONIX) 40 MG EC tablet Take 40 mg by mouth Daily.     • predniSONE (DELTASONE) 5 MG tablet Take 5 mg by mouth Daily.     • pregabalin (LYRICA) 50 MG capsule 150 mg 3 (Three) Times a Day.     • promethazine (PHENERGAN) 12.5 MG tablet Take 12.5 mg by mouth Every 6 (Six) Hours As Needed for Nausea or Vomiting.     • risperiDONE (risperDAL) 0.5 MG tablet Take 0.5 mg by mouth 2 (Two) Times a Day.     • sodium chloride 1 g tablet Take 1 g by mouth 2 (two) times a day.     • traZODone (DESYREL) 50 MG tablet Take 100 mg by mouth Every Night.     • Chlorpheniramine-Acetaminophen (CORICIDIN) 2-325 MG tablet Take  by mouth.     • magnesium oxide (MAGNESIUM-OXIDE) 400 (241.3 Mg) MG tablet tablet MAGNESIUM-OXIDE 400 (241.3 Mg) MG TABS         Objective   /72 (BP Location: Left arm, Patient Position: Sitting)   Pulse 67   Temp 98.6 °F (37 °C) (Oral)   Resp 17   Ht 175.3 cm (69\")   Wt 79.8 kg (175 lb 14.8 oz)   SpO2 95%   BMI 25.98 kg/m²     General: Alert and oriented in bed x3 no new complaints  Vascular: Pulses are not palpable capillary fill time is delayed in the right foot.  Skin is shiny and taut  Neuro: Light touch and protective sensation on the right lower extremity is noted to be diminished  MSK: Limited use of right lower extremity secondary to previous stroke  Derm: Right great toe has an ulceration at the IPJ joint.  Moderate amount of serous drainage.  There is some probing down to bone.  " Erythema and edema is noted to be improved today.  Betadine gauze dressing has been placed.  MRI was suggestive of osteomyelitis in the proximal phalanx and half of the distal phalanx.       Results from last 7 days   Lab Units 07/15/20  0406   WBC 10*3/mm3 6.00   HEMOGLOBIN g/dL 10.5*   HEMATOCRIT % 30.4*   PLATELETS 10*3/mm3 157       Assessment/Plan     Patient Active Problem List   Diagnosis   • Anemia   • Pre-operative cardiovascular examination   • Peripheral vascular disease (CMS/HCC)   • Hypertension   • Hyperlipidemia   • Diabetic foot ulcer (CMS/HCC)   • Diabetes mellitus (CMS/HCC)   • Coronary artery disease   • Coronary angioplasty status   • Claudication (CMS/HCC)   • Chest pain   • Bipolar affective disorder (CMS/HCC)   • Skin ulcer (CMS/HCC)   • Cellulitis of right toe   • Hyponatremia   • Hypomagnesemia   • Seasonal allergies   • Anxiety disorder   • RLS (restless legs syndrome)   • Peripheral neuropathy   • History of CVA (cerebrovascular accident)       Ulceration right great toe with osteomyelitis  Patient is on the schedule for amputation of right great toe on Friday morning at 9:00.  The meantime will continue with IV antibiotics per infectious disease and dressing changes.  If all goes according to plan for surgery on Friday patient should be able to be discharge either later that day or early Saturday from a podiatry standpoint.  We will continue to follow when in-house.  All questions were answered for the patient today and the plan was discussed with the patient and patient is on board with proceeding with this plan  Note is dictated utilizing voice recognition software. Unfortunately this leads to occasional typographical errors. I apologize in advance if the situation occurs. If questions occur please do not hesitate to call our office.

## 2020-07-15 NOTE — PLAN OF CARE
Patient continues on IV ABX. Tolerating well. No c/o pain this shift. Prepared for right great toe amputation scheduled for Friday. Dressing changed performed, tolerated well. Resting comfortable in bed at this time. Will continue to monitor.

## 2020-07-15 NOTE — PLAN OF CARE
Problem: Patient Care Overview  Goal: Plan of Care Review  Outcome: Ongoing (interventions implemented as appropriate)  Flowsheets (Taken 7/15/2020 0518)  Progress: no change  Plan of Care Reviewed With: patient  Outcome Summary: Pt slept well through the night, c/o pain to both feet and toes gave PRN pain medication tolerated well. Infectious Disease to see this AM. Will continue to monitor.

## 2020-07-16 LAB
ALBUMIN SERPL-MCNC: 3.5 G/DL (ref 3.5–5.2)
ALBUMIN/GLOB SERPL: 1.9 G/DL
ALP SERPL-CCNC: 35 U/L (ref 39–117)
ALT SERPL W P-5'-P-CCNC: 17 U/L (ref 1–41)
ANION GAP SERPL CALCULATED.3IONS-SCNC: 10 MMOL/L (ref 5–15)
AST SERPL-CCNC: 15 U/L (ref 1–40)
BASOPHILS # BLD AUTO: 0 10*3/MM3 (ref 0–0.2)
BASOPHILS NFR BLD AUTO: 0.9 % (ref 0–1.5)
BILIRUB SERPL-MCNC: 0.3 MG/DL (ref 0–1.2)
BUN SERPL-MCNC: 16 MG/DL (ref 8–23)
BUN SERPL-MCNC: ABNORMAL MG/DL
BUN/CREAT SERPL: ABNORMAL
CALCIUM SPEC-SCNC: 9.1 MG/DL (ref 8.6–10.5)
CHLORIDE SERPL-SCNC: 97 MMOL/L (ref 98–107)
CO2 SERPL-SCNC: 25 MMOL/L (ref 22–29)
CREAT SERPL-MCNC: 0.83 MG/DL (ref 0.76–1.27)
DEPRECATED RDW RBC AUTO: 51.2 FL (ref 37–54)
EOSINOPHIL # BLD AUTO: 0.1 10*3/MM3 (ref 0–0.4)
EOSINOPHIL NFR BLD AUTO: 2 % (ref 0.3–6.2)
ERYTHROCYTE [DISTWIDTH] IN BLOOD BY AUTOMATED COUNT: 16.8 % (ref 12.3–15.4)
GFR SERPL CREATININE-BSD FRML MDRD: 94 ML/MIN/1.73
GLOBULIN UR ELPH-MCNC: 1.8 GM/DL
GLUCOSE BLDC GLUCOMTR-MCNC: 130 MG/DL (ref 70–105)
GLUCOSE BLDC GLUCOMTR-MCNC: 149 MG/DL (ref 70–105)
GLUCOSE SERPL-MCNC: 93 MG/DL (ref 65–99)
HCT VFR BLD AUTO: 31.4 % (ref 37.5–51)
HGB BLD-MCNC: 10.5 G/DL (ref 13–17.7)
LYMPHOCYTES # BLD AUTO: 1.4 10*3/MM3 (ref 0.7–3.1)
LYMPHOCYTES NFR BLD AUTO: 27.5 % (ref 19.6–45.3)
MAGNESIUM SERPL-MCNC: 1.4 MG/DL (ref 1.6–2.4)
MCH RBC QN AUTO: 28.7 PG (ref 26.6–33)
MCHC RBC AUTO-ENTMCNC: 33.6 G/DL (ref 31.5–35.7)
MCV RBC AUTO: 85.6 FL (ref 79–97)
MONOCYTES # BLD AUTO: 0.8 10*3/MM3 (ref 0.1–0.9)
MONOCYTES NFR BLD AUTO: 15.8 % (ref 5–12)
NEUTROPHILS NFR BLD AUTO: 2.7 10*3/MM3 (ref 1.7–7)
NEUTROPHILS NFR BLD AUTO: 53.8 % (ref 42.7–76)
NRBC BLD AUTO-RTO: 0 /100 WBC (ref 0–0.2)
PLATELET # BLD AUTO: 160 10*3/MM3 (ref 140–450)
PMV BLD AUTO: 9.6 FL (ref 6–12)
POTASSIUM SERPL-SCNC: 3.9 MMOL/L (ref 3.5–5.2)
PROT SERPL-MCNC: 5.3 G/DL (ref 6–8.5)
RBC # BLD AUTO: 3.67 10*6/MM3 (ref 4.14–5.8)
SODIUM SERPL-SCNC: 132 MMOL/L (ref 136–145)
VANCOMYCIN TROUGH SERPL-MCNC: 16.5 MCG/ML (ref 5–20)
WBC # BLD AUTO: 5 10*3/MM3 (ref 3.4–10.8)

## 2020-07-16 PROCEDURE — 85025 COMPLETE CBC W/AUTO DIFF WBC: CPT | Performed by: INTERNAL MEDICINE

## 2020-07-16 PROCEDURE — 80053 COMPREHEN METABOLIC PANEL: CPT | Performed by: INTERNAL MEDICINE

## 2020-07-16 PROCEDURE — 25010000002 VANCOMYCIN 10 G RECONSTITUTED SOLUTION: Performed by: INTERNAL MEDICINE

## 2020-07-16 PROCEDURE — 83735 ASSAY OF MAGNESIUM: CPT | Performed by: NURSE PRACTITIONER

## 2020-07-16 PROCEDURE — 99232 SBSQ HOSP IP/OBS MODERATE 35: CPT | Performed by: INTERNAL MEDICINE

## 2020-07-16 PROCEDURE — 25010000002 CEFEPIME PER 500 MG: Performed by: INTERNAL MEDICINE

## 2020-07-16 PROCEDURE — 25010000002 MAGNESIUM SULFATE 2 GM/50ML SOLUTION: Performed by: NURSE PRACTITIONER

## 2020-07-16 PROCEDURE — 80202 ASSAY OF VANCOMYCIN: CPT | Performed by: INTERNAL MEDICINE

## 2020-07-16 PROCEDURE — 82962 GLUCOSE BLOOD TEST: CPT

## 2020-07-16 RX ORDER — NICOTINE POLACRILEX 4 MG
15 LOZENGE BUCCAL
Status: DISCONTINUED | OUTPATIENT
Start: 2020-07-16 | End: 2020-07-17 | Stop reason: HOSPADM

## 2020-07-16 RX ORDER — DEXTROSE MONOHYDRATE 25 G/50ML
25 INJECTION, SOLUTION INTRAVENOUS
Status: DISCONTINUED | OUTPATIENT
Start: 2020-07-16 | End: 2020-07-17 | Stop reason: HOSPADM

## 2020-07-16 RX ADMIN — DULOXETINE 20 MG: 20 CAPSULE, DELAYED RELEASE PELLETS ORAL at 10:48

## 2020-07-16 RX ADMIN — OXCARBAZEPINE 300 MG: 150 TABLET, FILM COATED ORAL at 10:48

## 2020-07-16 RX ADMIN — FENOFIBRATE 145 MG: 145 TABLET ORAL at 10:50

## 2020-07-16 RX ADMIN — THERA TABS 1 TABLET: TAB at 10:49

## 2020-07-16 RX ADMIN — HYDROCODONE BITARTRATE AND ACETAMINOPHEN 1 TABLET: 10; 325 TABLET ORAL at 00:06

## 2020-07-16 RX ADMIN — OXCARBAZEPINE 150 MG: 150 TABLET, FILM COATED ORAL at 21:37

## 2020-07-16 RX ADMIN — Medication: at 04:21

## 2020-07-16 RX ADMIN — Medication 10 ML: at 21:37

## 2020-07-16 RX ADMIN — SODIUM CHLORIDE 1250 MG: 9 INJECTION, SOLUTION INTRAVENOUS at 05:10

## 2020-07-16 RX ADMIN — RISPERIDONE 0.5 MG: 0.25 TABLET ORAL at 10:51

## 2020-07-16 RX ADMIN — CLOPIDOGREL BISULFATE 75 MG: 75 TABLET ORAL at 10:50

## 2020-07-16 RX ADMIN — DOCUSATE SODIUM 100 MG: 100 CAPSULE, LIQUID FILLED ORAL at 21:36

## 2020-07-16 RX ADMIN — SODIUM CHLORIDE TAB 1 GM 1 G: 1 TAB at 21:36

## 2020-07-16 RX ADMIN — MAGNESIUM OXIDE TAB 400 MG (241.3 MG ELEMENTAL MG) 400 MG: 400 (241.3 MG) TAB at 10:49

## 2020-07-16 RX ADMIN — CEFEPIME HYDROCHLORIDE 2 G: 2 INJECTION, POWDER, FOR SOLUTION INTRAVENOUS at 23:40

## 2020-07-16 RX ADMIN — HYDRALAZINE HYDROCHLORIDE 50 MG: 25 TABLET, FILM COATED ORAL at 10:50

## 2020-07-16 RX ADMIN — HYDRALAZINE HYDROCHLORIDE 50 MG: 25 TABLET, FILM COATED ORAL at 21:36

## 2020-07-16 RX ADMIN — METOPROLOL SUCCINATE 25 MG: 25 TABLET, EXTENDED RELEASE ORAL at 10:50

## 2020-07-16 RX ADMIN — AMLODIPINE BESYLATE 5 MG: 5 TABLET ORAL at 10:49

## 2020-07-16 RX ADMIN — ATORVASTATIN CALCIUM 80 MG: 40 TABLET, FILM COATED ORAL at 21:36

## 2020-07-16 RX ADMIN — CETIRIZINE HYDROCHLORIDE 10 MG: 10 TABLET, FILM COATED ORAL at 21:36

## 2020-07-16 RX ADMIN — CEFEPIME HYDROCHLORIDE 2 G: 2 INJECTION, POWDER, FOR SOLUTION INTRAVENOUS at 00:06

## 2020-07-16 RX ADMIN — HYDRALAZINE HYDROCHLORIDE 50 MG: 25 TABLET, FILM COATED ORAL at 17:37

## 2020-07-16 RX ADMIN — RISPERIDONE 0.5 MG: 0.25 TABLET ORAL at 21:36

## 2020-07-16 RX ADMIN — CEFEPIME HYDROCHLORIDE 2 G: 2 INJECTION, POWDER, FOR SOLUTION INTRAVENOUS at 10:48

## 2020-07-16 RX ADMIN — HYDROCODONE BITARTRATE AND ACETAMINOPHEN 1 TABLET: 10; 325 TABLET ORAL at 21:41

## 2020-07-16 RX ADMIN — SODIUM CHLORIDE TAB 1 GM 1 G: 1 TAB at 10:50

## 2020-07-16 RX ADMIN — CEFEPIME HYDROCHLORIDE 2 G: 2 INJECTION, POWDER, FOR SOLUTION INTRAVENOUS at 18:03

## 2020-07-16 RX ADMIN — PREGABALIN 150 MG: 75 CAPSULE ORAL at 17:36

## 2020-07-16 RX ADMIN — PREGABALIN 150 MG: 75 CAPSULE ORAL at 10:51

## 2020-07-16 RX ADMIN — OXCARBAZEPINE 300 MG: 150 TABLET, FILM COATED ORAL at 17:37

## 2020-07-16 RX ADMIN — PANTOPRAZOLE SODIUM 40 MG: 40 TABLET, DELAYED RELEASE ORAL at 10:49

## 2020-07-16 RX ADMIN — SODIUM CHLORIDE 1250 MG: 9 INJECTION, SOLUTION INTRAVENOUS at 20:41

## 2020-07-16 RX ADMIN — TRAZODONE HYDROCHLORIDE 100 MG: 100 TABLET ORAL at 21:36

## 2020-07-16 RX ADMIN — PREGABALIN 150 MG: 75 CAPSULE ORAL at 21:35

## 2020-07-16 RX ADMIN — HYDROCODONE BITARTRATE AND ACETAMINOPHEN 1 TABLET: 10; 325 TABLET ORAL at 12:09

## 2020-07-16 RX ADMIN — Medication 10 ML: at 11:02

## 2020-07-16 RX ADMIN — OXCARBAZEPINE 300 MG: 150 TABLET, FILM COATED ORAL at 12:09

## 2020-07-16 RX ADMIN — MAGNESIUM SULFATE HEPTAHYDRATE 2 G: 40 INJECTION, SOLUTION INTRAVENOUS at 12:09

## 2020-07-16 NOTE — PROGRESS NOTES
"Pharmacy Antimicrobial Dosing Service    Subjective:  Emery Nesbitt is a 60 y.o.male admitted with diabetic foot ulcer. Pharmacy has been consulted to dose Vancomycin for possible SSTI.  R/o osteomyelitis; vascular progress note states right great toe wound that easily probes to bone.    PMH: DM, PVD, s/p fem-pop bypass, multiple strokes (wheelchair bound 2/2 BLE weakness)      Assessment/Plan    1. Day #3 Vancomycin: Goal -600 mcg*h/mL. Trough on 7/16 0400 came back at 16.5 mcg/mL and the peak on 7/15 2100 came back at 9.6 mcg/mL. Doses of vancomycin were given at the correct time and levels were drawn at appropriate times. These levels cannot be theoretically accurate, and new levels were ordered. Obtain new trough 7/17 0400 prior to 6th total dose. Obtain random level 7/16 2100 after fifth dose for pharmacist to calculate patient-specific pharmacokinetic parameters and AUC. Continue vancomycin 1250 mg IV q12h.    2. Day #3 Cefepime:  2gm IV Q8h for estCrCl > 60 mL/min.    Will continue to monitor drug levels, renal function, culture and sensitivities, and patient clinical status.       Objective:  Relevant clinical data and objective history reviewed:  175.3 cm (69\")   79.9 kg (176 lb 2.4 oz)   Ideal body weight: 70.7 kg (155 lb 13.8 oz)  Adjusted ideal body weight: 74.4 kg (163 lb 15.7 oz)  Body mass index is 26.01 kg/m².    Results from last 7 days   Lab Units 07/16/20  0354 07/15/20  2047   VANCOMYCIN PK mcg/mL  --  9.60*   VANCOMYCIN TR mcg/mL 16.50  --      Results from last 7 days   Lab Units 07/16/20  0354 07/15/20  0406 07/14/20  1216   CREATININE mg/dL 0.83 0.79 0.73*     Estimated Creatinine Clearance: 104.3 mL/min (by C-G formula based on SCr of 0.83 mg/dL).  I/O last 3 completed shifts:  In: 1660 [P.O.:960; IV Piggyback:700]  Out: 3450 [Urine:3450]    Results from last 7 days   Lab Units 07/16/20  0354 07/15/20  0406 07/14/20  1216   WBC 10*3/mm3 5.00 6.00 7.50     Temperature    07/15/20 1411 " 07/15/20 2000 07/16/20 0359   Temp: 98.6 °F (37 °C) 98.6 °F (37 °C) 97.9 °F (36.6 °C)     Baseline culture/source/susceptibility:  Microbiology Results (last 10 days)       Procedure Component Value - Date/Time    Wound Culture - Wound, Foot, Right [[id number]]  (Abnormal) Collected:  07/14/20 1242    Lab Status:  Preliminary result Specimen:  Wound from Foot, Right Updated:  07/15/20 1145     Wound Culture Light growth (2+) Corynebacterium species     Comment:   No definitive guidelines. All are susceptible to vancomycin. Resistance to penicillins & cephalosporins does occur.        Gram Stain Few (2+) WBCs per low power field      No organisms seen             Anti-Infectives (From admission, onward)      Ordered     Dose/Rate Route Frequency Start Stop    07/16/20 0905  !Vancomycin Level Draw Needed     Ordering Provider:  Marcia Zhu MD     Does not apply Once 07/17/20 0400      07/16/20 0905  !Vancomycin Level Draw Needed     Ordering Provider:  Marcia Zhu MD     Does not apply Once 07/16/20 2100      07/14/20 1452  !Vancomycin Level Draw Needed     Ordering Provider:  Jac Sheikh DO     Does not apply Once 07/16/20 0400 07/16/20 0421    07/14/20 1452  !Vancomycin Level Draw Needed     Ordering Provider:  Jac Sheikh DO     Does not apply Once 07/15/20 2100 07/15/20 2117    07/14/20 1452  vancomycin 1250 mg/250 mL 0.9% NS IVPB ([facility])  Review   Ordering Provider:  Jac Sheikh DO    1,250 mg  over 120 Minutes Intravenous Every 12 Hours 07/15/20 0500 07/22/20 0459    07/14/20 1452  cefepime 2 gm IVPB in 100 ml NS (MBP)  Review   Ordering Provider:  Jac Sheikh DO    2 g  over 30 Minutes Intravenous Every 8 Hours 07/15/20 0000 07/21/20 2359    07/14/20 1204  vancomycin IVPB 1750 mg in 0.9% Sodium Chloride (premix) 500 mL     Ordering Provider:  Pastora Bowman APRN    1,750 mg  over 120 Minutes Intravenous Once 07/14/20 1300 07/14/20 1951 07/14/20 1155  cefepime 2 gm IVPB in 100 ml NS (MBP)      Ordering Provider:  Pastora Bowman APRN    2 g  over 30 Minutes Intravenous Once 07/14/20 1215 07/14/20 1715    07/14/20 1155  Pharmacy to dose vancomycin  Review   Ordering Provider:  Pastora Bowman APRN     Does not apply Continuous PRN 07/14/20 1155 07/21/20 1154            Romi Joe, Pharmacy Intern  07/16/20 09:06

## 2020-07-16 NOTE — PLAN OF CARE
Problem: Patient Care Overview  Goal: Plan of Care Review  Outcome: Ongoing (interventions implemented as appropriate)  Flowsheets (Taken 7/16/2020 1152)  Progress: no change  Plan of Care Reviewed With: patient  Outcome Summary: Pt slept well through the night. C/o feet hurting, gave PRN pain medication tolerated well. Pt also getting IV abx, pt will be having R Great Toe amputated on Friday, will continue to monitor.

## 2020-07-16 NOTE — PLAN OF CARE
Problem: Patient Care Overview  Goal: Plan of Care Review  Outcome: Ongoing (interventions implemented as appropriate)  Flowsheets (Taken 7/16/2020 9440)  Plan of Care Reviewed With: patient  Note:   Pt has had some c/o pain- gave pain medicine to help alleviate. Pt been in bed all day. Changed dressing on toe. Pt is supposed to go down for amputation of L. great toe tomorrow. No other complaints today. Will continue to monitor.

## 2020-07-16 NOTE — PROGRESS NOTES
07/16/20   Foot and Ankle Surgery - Inpatient Follow-up  Provider: Dr. Will Garcia [facility]  Location:[facility]  Chief Complaint: No new complaints    Subjective:  Emery Nesbitt is a 60 y.o. male states he is ready to have the surgery.  Overall he is feeling fine.          No Known Allergies    No current facility-administered medications on file prior to encounter.      Current Outpatient Medications on File Prior to Encounter   Medication Sig Dispense Refill   • acetaminophen (TYLENOL) 325 MG tablet Take 650 mg by mouth Every 6 (Six) Hours As Needed.     • amLODIPine (NORVASC) 5 MG tablet Take 5 mg by mouth Daily.     • atorvastatin (LIPITOR) 80 MG tablet Take 80 mg by mouth Every Night.     • cetirizine (zyrTEC) 10 MG tablet Take 10 mg by mouth Every Night.     • CloNIDine (CATAPRES) 0.2 MG tablet Take 0.1 mg by mouth 3 (Three) Times a Day As Needed.     • clopidogrel (PLAVIX) 75 MG tablet Take 75 mg by mouth Daily.     • dicyclomine (BENTYL) 10 MG capsule Take 10 mg by mouth Every 6 (Six) Hours As Needed.     • docusate sodium (COLACE) 100 MG capsule Take 100 mg by mouth 2 (Two) Times a Day.     • DULoxetine (CYMBALTA) 20 MG capsule Take 20 mg by mouth Daily.     • fenofibrate (TRICOR) 145 MG tablet Take 145 mg by mouth Daily.     • hydrALAZINE (APRESOLINE) 50 MG tablet 3 (Three) Times a Day.     • HYDROcodone-acetaminophen (NORCO)  MG per tablet Take 1 tablet by mouth Every 6 (Six) Hours As Needed.     • loperamide (IMODIUM) 2 MG capsule Take 2 mg by mouth 4 (Four) Times a Day As Needed for Diarrhea.     • metFORMIN (GLUCOPHAGE) 500 MG tablet Take 500 mg by mouth 2 (Two) Times a Day With Meals.     • metoprolol succinate XL (TOPROL-XL) 25 MG 24 hr tablet Take 25 mg by mouth Daily.     • Multiple Vitamins-Minerals (MULTIVITAMIN WITH MINERALS) tablet tablet Take 1 tablet by mouth Daily.     • nitroglycerin (NITROLINGUAL) 0.4 MG/SPRAY spray NITROLINGUAL 0.4 MG/SPRAY SOLN     • OXcarbazepine  "(TRILEPTAL) 300 MG tablet Take 150 mg by mouth Every Night.     • OXcarbazepine (TRILEPTAL) 300 MG tablet Take 300 mg by mouth 3 (Three) Times a Day.     • pantoprazole (PROTONIX) 40 MG EC tablet Take 40 mg by mouth Daily.     • predniSONE (DELTASONE) 5 MG tablet Take 5 mg by mouth Daily.     • pregabalin (LYRICA) 50 MG capsule 150 mg 3 (Three) Times a Day.     • promethazine (PHENERGAN) 12.5 MG tablet Take 12.5 mg by mouth Every 6 (Six) Hours As Needed for Nausea or Vomiting.     • risperiDONE (risperDAL) 0.5 MG tablet Take 0.5 mg by mouth 2 (Two) Times a Day.     • sodium chloride 1 g tablet Take 1 g by mouth 2 (two) times a day.     • traZODone (DESYREL) 50 MG tablet Take 100 mg by mouth Every Night.     • Chlorpheniramine-Acetaminophen (CORICIDIN) 2-325 MG tablet Take  by mouth.     • magnesium oxide (MAGNESIUM-OXIDE) 400 (241.3 Mg) MG tablet tablet MAGNESIUM-OXIDE 400 (241.3 Mg) MG TABS         Objective   /79 (BP Location: Left leg, Patient Position: Lying)   Pulse 59   Temp 98.6 °F (37 °C) (Oral)   Resp 15   Ht 175.3 cm (69\")   Wt 79.9 kg (176 lb 2.4 oz)   SpO2 98%   BMI 26.01 kg/m²     General: Alert and oriented by 3  Vascular: Pulses difficult to palpate capillary fill time delayed on right foot but absolute pressures in the great toe appear to be adequate for healing.  Neuro: Light touch and protective sensation diminished in the right lower extremity  MSK: Limited use of right lower extremity secondary to previous strokes.  Contraction of the IPJ joint of the great toe with ulceration as well as contraction of the lesser digits  Derm: Wound at the IPJ joint culture showing Corynebacterium.  Erythema and edema is noted to be decreased in his right great toe.  MRI is suggestive of osteomyelitis in the proximal phalanx and portion of the distal phalanx      Results from last 7 days   Lab Units 07/16/20  0354   WBC 10*3/mm3 5.00   HEMOGLOBIN g/dL 10.5*   HEMATOCRIT % 31.4*   PLATELETS 10*3/mm3 " 160       Assessment/Plan     Patient Active Problem List   Diagnosis   • Anemia   • Pre-operative cardiovascular examination   • Peripheral vascular disease (CMS/HCC)   • Hypertension   • Hyperlipidemia   • Diabetic foot ulcer (CMS/HCC)   • Diabetes mellitus (CMS/HCC)   • Coronary artery disease   • Coronary angioplasty status   • Claudication (CMS/HCC)   • Chest pain   • Bipolar affective disorder (CMS/HCC)   • Skin ulcer (CMS/HCC)   • Cellulitis of right toe   • Hyponatremia   • Hypomagnesemia   • Seasonal allergies   • Anxiety disorder   • RLS (restless legs syndrome)   • Peripheral neuropathy   • History of CVA (cerebrovascular accident)     Patient scheduled for surgery tomorrow at 9 AM.  Plan is for an amputation of his right great toe.  Risk and benefits were described to the patient which include but not limited to bleeding infection nerve damage loss of limb loss of life.  All questions were answered for the patient concerning the surgery and postoperative care.  Patient will be made n.p.o. at midnight.  He can continue his Plavix.  We will have him sign a consent.  If surgery goes this plan possible discharge either Friday or Saturday and follow-up in the office

## 2020-07-16 NOTE — PROGRESS NOTES
Infectious Diseases Progress Note      LOS: 2 days   Patient Care Team:  Wang Johnson MD as PCP - General  Sharath Conklin MD as Consulting Physician (Cardiology)    Chief Complaint: Right foot pain    Subjective       The patient has been afebrile for the last 24 hours.  The patient is on room air, hemodynamically stable, and is tolerating antimicrobial therapy.      Review of Systems:   Review of Systems   Constitutional: Negative.    HENT: Negative.    Respiratory: Negative.    Cardiovascular: Negative.    Gastrointestinal: Negative.    Genitourinary: Negative.    Musculoskeletal:        Right foot pain   Skin: Positive for wound.   Neurological: Negative.    Psychiatric/Behavioral: Negative.         Objective     Vital Signs  Temp:  [97.9 °F (36.6 °C)-98.6 °F (37 °C)] 98.6 °F (37 °C)  Heart Rate:  [53-63] 59  Resp:  [15-16] 15  BP: (110-127)/(62-79) 127/79    Physical Exam:  Physical Exam   Constitutional: He is oriented to person, place, and time. He appears well-developed and well-nourished.   HENT:   Head: Normocephalic and atraumatic.   Eyes: Pupils are equal, round, and reactive to light. Conjunctivae and EOM are normal.   Neck: Neck supple.   Cardiovascular: Normal rate, regular rhythm and normal heart sounds.   Pulmonary/Chest: Effort normal and breath sounds normal.   Abdominal: Soft. Bowel sounds are normal.   Musculoskeletal: Normal range of motion.   Neurological: He is alert and oriented to person, place, and time.   Skin: Skin is warm and dry.   Right foot dressing is clean and dry   Psychiatric: He has a normal mood and affect.   Vitals reviewed.       Results Review:    I have reviewed all clinical data, test, lab, and imaging results.     Radiology  No Radiology Exams Resulted Within Past 24 Hours    Cardiology    Laboratory    Results from last 7 days   Lab Units 07/16/20  0354 07/15/20  0406 07/14/20  1216   WBC 10*3/mm3 5.00 6.00 7.50   HEMOGLOBIN g/dL 10.5* 10.5* 11.0*   HEMATOCRIT % 31.4*  30.4* 31.8*   PLATELETS 10*3/mm3 160 157 157     Results from last 7 days   Lab Units 07/16/20  0354 07/15/20  0406 07/14/20  1216   SODIUM mmol/L 132* 132* 131*   POTASSIUM mmol/L 3.9 3.9 4.0   CHLORIDE mmol/L 97* 96* 95*   CO2 mmol/L 25.0 26.0 26.0   BUN  16 12 16   CREATININE mg/dL 0.83 0.79 0.73*   GLUCOSE mg/dL 93 104* 80   ALBUMIN g/dL 3.50 3.60 4.00   BILIRUBIN mg/dL 0.3 0.2 0.2   ALK PHOS U/L 35* 37* 43   AST (SGOT) U/L 15 14 19   ALT (SGPT) U/L 17 17 21   CALCIUM mg/dL 9.1 9.2 9.2         Results from last 7 days   Lab Units 07/14/20  1216   SED RATE mm/hr 10         Microbiology   Microbiology Results (last 10 days)     Procedure Component Value - Date/Time    Wound Culture - Wound, Foot, Right [[id number]]  (Abnormal) Collected:  07/14/20 1242    Lab Status:  Preliminary result Specimen:  Wound from Foot, Right Updated:  07/16/20 0910     Wound Culture Light growth (2+) Corynebacterium species     Comment:   No definitive guidelines. All are susceptible to vancomycin. Resistance to penicillins & cephalosporins does occur.        Gram Stain Few (2+) WBCs per low power field      No organisms seen          Medication Review:       Schedule Meds    !Vancomycin Level Draw Needed  Does not apply Once   [START ON 7/17/2020] !Vancomycin Level Draw Needed  Does not apply Once   amLODIPine 5 mg Oral Daily   atorvastatin 80 mg Oral Nightly   cefepime 2 g Intravenous Q8H   cetirizine 10 mg Oral Nightly   clopidogrel 75 mg Oral Daily   docusate sodium 100 mg Oral BID   DULoxetine 20 mg Oral Daily   fenofibrate 145 mg Oral Daily   hydrALAZINE 50 mg Oral TID   magnesium oxide 400 mg Oral Daily   metoprolol succinate XL 25 mg Oral Daily   OXcarbazepine 150 mg Oral Nightly   OXcarbazepine 300 mg Oral TID - Nitrates   pantoprazole 40 mg Oral Daily   pregabalin 150 mg Oral TID   risperiDONE 0.5 mg Oral BID   sodium chloride 10 mL Intravenous Q12H   sodium chloride 1 g Oral BID   Thera 1 tablet Oral Daily   traZODone 100 mg  Oral Nightly   vancomycin 1,250 mg Intravenous Q12H       Infusion Meds    Pharmacy to dose vancomycin        PRN Meds  acetaminophen **OR** acetaminophen **OR** acetaminophen  •  hydrALAZINE  •  HYDROcodone-acetaminophen  •  magnesium sulfate **OR** magnesium sulfate in D5W 1g/100mL (PREMIX)  •  ondansetron **OR** ondansetron  •  Pharmacy to dose vancomycin  •  potassium chloride  •  sodium chloride        Assessment/Plan       Antimicrobial Therapy   1.  IV vancomycin      day  2.  IV cefepime      day  3.      Day  4.      Day  5.      Day      Assessment     Right diabetic foot with osteomyelitis of the right big toe involving the proximal and the distal phalanx.  Wound cultures are growing Corynebacterium     Diabetes mellitus with diabetic neuropathy-A1c is 5.4     Peripheral vascular disease.  Patient status post bilateral leg bypass surgery few years ago.  The patient is being followed by vascular surgery service and was felt to have good blood flow to heal amputation of the toe  -Vascular surgery has no revascularization plans at this time     Plan     Continue IV vancomycin and IV cefepime for now waiting on the final wound culture results  The patient is scheduled for right big toe amputation on Friday  Continue supportive care  Labs in a.m.    Jody Bazzi, APRN  07/16/20  16:34

## 2020-07-16 NOTE — PROGRESS NOTES
"      [facility] Daily Progress Note      Hospitalist Team  LOS 2 days      Patient Care Team:  Wang Johnson MD as PCP - Sharath Aguilera MD as Consulting Physician (Cardiology)    Patient Location: 305/1      Subjective   Subjective     Chief Complaint / Subjective  No chief complaint on file.  follow up great toe infection    Having some more pain and neuropathy associated in the foot and toe.  No fevers however.    Brief Synopsis of Hospital Course/HPI  Mr. Nesbitt is a 60 y.o. male with a past medical history of CAD, hypertension, hyperlipidemia, PVD status post bilateral bypass grafts, CVA, bipolar, anemia, and diabetes mellitus type 2 who was directly admitted to [facility] by vascular due to patient having a right great toe wound/cellulitis.  Hospitalist accepted the patient for further care and management.  Patient states he developed an ulcer on his right big toe 2 months ago.  He states it was due to his toe rubbing on his shoe.  He has been following up with Dr. Garcia outpatient and was given a new pair shoes.  Today he followed up with Dr. Olson and patient was found to have right great toe cellulitis.  Patient denies any pain.  He denies any recent nausea, vomiting, diarrhea, fever, chills, cough, shortness of breath, or chest pain.  He currently resides at [facility].        Review of Systems   Constitution: Negative for fever.   Skin: Positive for poor wound healing.         Objective   Objective      Vital Signs  Temp:  [97.9 °F (36.6 °C)-98.6 °F (37 °C)] 98.6 °F (37 °C)  Heart Rate:  [53-63] 59  Resp:  [15-16] 15  BP: (110-127)/(62-79) 127/79  Oxygen Therapy  SpO2: 98 %  Pulse Oximetry Type: Intermittent  Device (Oxygen Therapy): room air  Flowsheet Rows      First Filed Value   Admission Height  175.3 cm (69\") Documented at 07/14/2020 1104   Admission Weight  82 kg (180 lb 12.4 oz) Documented at 07/14/2020 1104        Intake " & Output (last 3 days)       07/13 0701 - 07/14 0700 07/14 0701 - 07/15 0700 07/15 0701 - 07/16 0700 07/16 0701 - 07/17 0700    P.O.  480 960 480    IV Piggyback  350 350     Total Intake(mL/kg)  830 (10.4) 1310 (16.4) 480 (6)    Urine (mL/kg/hr)  2150 2200 (1.1) 300 (0.4)    Stool   0 0    Total Output  2150 2200 300    Net  -1320 -890 +180            Stool Unmeasured Occurrence    2 x        Lines, Drains & Airways    Active LDAs     Name:   Placement date:   Placement time:   Site:   Days:    Peripheral IV 07/14/20 1415 Anterior;Right Forearm   07/14/20    1415    Forearm   less than 1                  Physical Exam:    Physical Exam   Constitutional: He is oriented to person, place, and time. No distress.   HENT:   Head: Normocephalic.   Eyes: Pupils are equal, round, and reactive to light.   Cardiovascular: Normal rate and regular rhythm.   Pulmonary/Chest: Effort normal. No respiratory distress.   Abdominal: Soft. He exhibits no distension.   Musculoskeletal:   Erythema, slight swelling and open wound on right great toe with bloody drainage     Neurological: He is alert and oriented to person, place, and time.   Skin: Skin is warm and dry.   Psychiatric: He has a normal mood and affect.   Vitals reviewed.            Procedures:              Results Review:     I reviewed the patient's new clinical results.      Lab Results (last 24 hours)     Procedure Component Value Units Date/Time    Wound Culture - Wound, Foot, Right [[id number]]  (Abnormal) Collected:  07/14/20 1242    Specimen:  Wound from Foot, Right Updated:  07/16/20 0910     Wound Culture Light growth (2+) Corynebacterium species     Comment:   No definitive guidelines. All are susceptible to vancomycin. Resistance to penicillins & cephalosporins does occur.        Gram Stain Few (2+) WBCs per low power field      No organisms seen    BUN [[id number]]  (Normal) Collected:  07/16/20 0354    Specimen:  Blood Updated:  07/16/20 0849     BUN 16 mg/dL      Vancomycin, Trough [[id number]]  (Normal) Collected:  07/16/20 0354    Specimen:  Blood Updated:  07/16/20 0557     Vancomycin Trough 16.50 mcg/mL     Magnesium [[id number]]  (Abnormal) Collected:  07/16/20 0354    Specimen:  Blood Updated:  07/16/20 0557     Magnesium 1.4 mg/dL     Comprehensive Metabolic Panel [[id number]]  (Abnormal) Collected:  07/16/20 0354    Specimen:  Blood Updated:  07/16/20 0557     Glucose 93 mg/dL      BUN --     Comment: Testing performed by alternate method        Creatinine 0.83 mg/dL      Sodium 132 mmol/L      Potassium 3.9 mmol/L      Chloride 97 mmol/L      CO2 25.0 mmol/L      Calcium 9.1 mg/dL      Total Protein 5.3 g/dL      Albumin 3.50 g/dL      ALT (SGPT) 17 U/L      AST (SGOT) 15 U/L      Alkaline Phosphatase 35 U/L      Total Bilirubin 0.3 mg/dL      eGFR Non African Amer 94 mL/min/1.73      Globulin 1.8 gm/dL      A/G Ratio 1.9 g/dL      BUN/Creatinine Ratio --     Comment: Testing not performed        Anion Gap 10.0 mmol/L     Narrative:       GFR Normal >60  Chronic Kidney Disease <60  Kidney Failure <15      CBC & Differential [[id number]] Collected:  07/16/20 0354    Specimen:  Blood Updated:  07/16/20 0530    Narrative:       The following orders were created for panel order CBC & Differential.  Procedure                               Abnormality         Status                     ---------                               -----------         ------                     CBC Auto Differential[[id number]]        Abnormal            Final result                 Please view results for these tests on the individual orders.    CBC Auto Differential [[id number]]  (Abnormal) Collected:  07/16/20 0354    Specimen:  Blood Updated:  07/16/20 0530     WBC 5.00 10*3/mm3      RBC 3.67 10*6/mm3      Hemoglobin 10.5 g/dL      Hematocrit 31.4 %      MCV 85.6 fL      MCH 28.7 pg      MCHC 33.6 g/dL      RDW 16.8 %      RDW-SD 51.2 fl      MPV 9.6 fL      Platelets 160 10*3/mm3      Neutrophil  % 53.8 %      Lymphocyte % 27.5 %      Monocyte % 15.8 %      Eosinophil % 2.0 %      Basophil % 0.9 %      Neutrophils, Absolute 2.70 10*3/mm3      Lymphocytes, Absolute 1.40 10*3/mm3      Monocytes, Absolute 0.80 10*3/mm3      Eosinophils, Absolute 0.10 10*3/mm3      Basophils, Absolute 0.00 10*3/mm3      nRBC 0.0 /100 WBC     Vancomycin, Peak [[id number]]  (Abnormal) Collected:  07/15/20 2047    Specimen:  Blood Updated:  07/15/20 2140     Vancomycin Peak 9.60 mcg/mL         Hemoglobin A1C   Date Value Ref Range Status   07/14/2020 5.4 3.5 - 5.6 % Final               No results found for: LIPASE  No results found for: CHOL, CHLPL, TRIG, HDL, LDL, LDLDIRECT    No results found for: INTRAOP, PREDX, FINALDX, COMDX    Microbiology Results (last 10 days)     Procedure Component Value - Date/Time    Wound Culture - Wound, Foot, Right [[id number]]  (Abnormal) Collected:  07/14/20 1242    Lab Status:  Preliminary result Specimen:  Wound from Foot, Right Updated:  07/16/20 0910     Wound Culture Light growth (2+) Corynebacterium species     Comment:   No definitive guidelines. All are susceptible to vancomycin. Resistance to penicillins & cephalosporins does occur.        Gram Stain Few (2+) WBCs per low power field      No organisms seen          ECG/EMG Results (most recent)     Procedure Component Value Units Date/Time    ECG 12 Lead [[id number]] Collected:  07/14/20 1155     Updated:  07/14/20 1157    Narrative:       HEART RATE= 62  bpm  RR Interval= 968  ms  SD Interval= 77  ms  P Horizontal Axis=   deg  P Front Axis= 0  deg  QRSD Interval= 95  ms  QT Interval= 368  ms  QRS Axis= 37  deg  T Wave Axis= 5  deg  - NORMAL ECG -  Sinus rhythm  When compared with ECG of 29-Aug-2015 3:16:38,  No significant change  Electronically Signed By:   Date and Time of Study: 2020-07-14 11:55:26          Results for orders placed during the hospital encounter of 07/14/20   Doppler Ankle Brachial Index Single Level CAR    Narrative ·  Right Conclusion: The right CESAR is moderately reduced. Mild digital   ischemia.  · Left Conclusion: The left CESAR is moderately reduced. Mild digital   ischemia.               Ct Angio Abdominal Aorta Bilateral Iliofem Runoff    Result Date: 7/15/2020   1. No aortoiliac occlusive disease. 2. It appears that the patient's had previous femoral popliteal bypass graft placement. The grafts are not seen well, but are clearly chronically occluded. There is also chronic occlusion of the native superficial femoral arteries and popliteal arteries as described above. 3. There is a infrapopliteal small vessel disease, left greater than right side. 4. Additional vascular and nonvascular findings as noted above.  Electronically Signed By-Noam Soares On:7/15/2020 10:50 AM This report was finalized on [id number] by  Noam Soares, .    Xr Chest 1 View    Result Date: 7/14/2020  Old healed granulomatous disease. No acute process identified in the chest  Electronically Signed By-Melo Sims On:7/14/2020 12:06 PM This report was finalized on [id number] by  Melo Sims, .    Mri Foot Right With & Without Contrast    Result Date: 7/14/2020  1.Osteomyelitis of the proximal phalanx of the great toe and proximal one half of the distal phalanx great toe with ulceration along the medial dorsal cortex. 2.No drainable fluid collection to suggest osteomyelitis. 3.Cellulitis of the great toe. Electronically Signed: Jessica Morales MD 7/14/2020 16:14 EDT          Xrays, labs reviewed personally by physician.    Medication Review:   I have reviewed the patient's current medication list      Scheduled Meds    !Vancomycin Level Draw Needed  Does not apply Once   [START ON 7/17/2020] !Vancomycin Level Draw Needed  Does not apply Once   amLODIPine 5 mg Oral Daily   atorvastatin 80 mg Oral Nightly   cefepime 2 g Intravenous Q8H   cetirizine 10 mg Oral Nightly   clopidogrel 75 mg Oral Daily   docusate sodium 100 mg Oral BID   DULoxetine 20 mg Oral Daily      fenofibrate 145 mg Oral Daily   hydrALAZINE 50 mg Oral TID   magnesium oxide 400 mg Oral Daily   metoprolol succinate XL 25 mg Oral Daily   OXcarbazepine 150 mg Oral Nightly   OXcarbazepine 300 mg Oral TID - Nitrates   pantoprazole 40 mg Oral Daily   pregabalin 150 mg Oral TID   risperiDONE 0.5 mg Oral BID   sodium chloride 10 mL Intravenous Q12H   sodium chloride 1 g Oral BID   Thera 1 tablet Oral Daily   traZODone 100 mg Oral Nightly   vancomycin 1,250 mg Intravenous Q12H       Meds Infusions    Pharmacy to dose vancomycin        Meds PRN  acetaminophen **OR** acetaminophen **OR** acetaminophen  •  hydrALAZINE  •  HYDROcodone-acetaminophen  •  magnesium sulfate **OR** magnesium sulfate in D5W 1g/100mL (PREMIX)  •  ondansetron **OR** ondansetron  •  Pharmacy to dose vancomycin  •  potassium chloride  •  sodium chloride        Assessment/Plan   Assessment/Plan     Active Hospital Problems    Diagnosis  POA   • **Diabetic foot ulcer (CMS/HCC) [E11.621, L97.509]  Yes   • Cellulitis of right toe [L03.031]  Yes   • Hyponatremia [E87.1]  Yes   • Hypomagnesemia [E83.42]  Yes   • Seasonal allergies [J30.2]  Yes   • Anxiety disorder [F41.9]  Yes   • RLS (restless legs syndrome) [G25.81]  Yes   • Peripheral neuropathy [G62.9]  Yes   • History of CVA (cerebrovascular accident) [Z86.73]  Not Applicable   • Bipolar affective disorder (CMS/HCC) [F31.9]  Yes   • Peripheral vascular disease (CMS/[facility]) [I73.9]  Yes   • Anemia [D64.9]  Yes   • Coronary artery disease [I25.10]  Yes   • Diabetes mellitus (CMS/HCC) [E11.9]  Yes   • Hyperlipidemia [E78.5]  Yes   • Hypertension [I10]  Yes      Resolved Hospital Problems   No resolved problems to display.       MEDICAL DECISION MAKING COMPLEXITY BY PROBLEM:     Right great toe osteomyelitis  -MRI and CTA lower extremity rewiewed  -Wound Cx growing Corynebacterium spp, awaiting final  -on Vanc and cefepime  -Podiatry plans for likely amputation possibly tomorrow  -ID following  -vascular  following, no plans for re-vascularization     Femoral-popliteal bypasses chronically occluded bilaterally with collateral circulation  -vascular plans as noted above    Diabetes Mellitus Type II with peripheral neuropathy   -SSI,   -Holding home metformin   -Continue lyrica (INSPECT verified)     CAD   -Continue Plavix, metoprolol , and statin     HLD   -Continue statin and tricor     Essential HTN   -Monitor blood pressure   -Uncontrolled   -Continue hydralazine and amlodipine  -IV hydralazine ordered PRN     VTE Prophylaxis -   Mechanical Order History:      Ordered        07/14/20 1157  Place Sequential Compression Device  Once         07/14/20 1157  Maintain Sequential Compression Device  Continuous                 Pharmalogical Order History:     None            Code Status -   Code Status and Medical Interventions:   Ordered at: 07/14/20 1157     Level Of Support Discussed With:    Patient     Code Status:    CPR     Medical Interventions (Level of Support Prior to Arrest):    Full       This patient has been examined wearing appropriate Personal Protective Equipment. 07/16/20        Discharge Planning    pending amputation      Destination      Coordination has not been started for this encounter.      Durable Medical Equipment      Coordination has not been started for this encounter.      Dialysis/Infusion      Coordination has not been started for this encounter.      Home Medical Care      Coordination has not been started for this encounter.      Therapy      Coordination has not been started for this encounter.      Community Resources      Coordination has not been started for this encounter.            Electronically signed by Jac Sheikh DO, 07/16/20, 16:59.  [facility]ist Team

## 2020-07-17 ENCOUNTER — APPOINTMENT (OUTPATIENT)
Dept: GENERAL RADIOLOGY | Facility: HOSPITAL | Age: 60
End: 2020-07-17

## 2020-07-17 ENCOUNTER — ANESTHESIA (OUTPATIENT)
Dept: PERIOP | Facility: HOSPITAL | Age: 60
End: 2020-07-17

## 2020-07-17 ENCOUNTER — ANESTHESIA EVENT (OUTPATIENT)
Dept: PERIOP | Facility: HOSPITAL | Age: 60
End: 2020-07-17

## 2020-07-17 VITALS
TEMPERATURE: 97.8 F | HEART RATE: 69 BPM | HEIGHT: 69 IN | WEIGHT: 176.81 LBS | BODY MASS INDEX: 26.19 KG/M2 | SYSTOLIC BLOOD PRESSURE: 137 MMHG | OXYGEN SATURATION: 100 % | RESPIRATION RATE: 15 BRPM | DIASTOLIC BLOOD PRESSURE: 68 MMHG

## 2020-07-17 PROBLEM — L97.516 DIABETIC ULCER OF TOE OF RIGHT FOOT ASSOCIATED WITH TYPE 2 DIABETES MELLITUS, WITH BONE INVOLVEMENT WITHOUT EVIDENCE OF NECROSIS (HCC): Status: ACTIVE | Noted: 2020-07-14

## 2020-07-17 PROBLEM — E11.621 DIABETIC ULCER OF TOE OF RIGHT FOOT ASSOCIATED WITH TYPE 2 DIABETES MELLITUS, WITH BONE INVOLVEMENT WITHOUT EVIDENCE OF NECROSIS: Status: ACTIVE | Noted: 2020-07-14

## 2020-07-17 LAB
ANION GAP SERPL CALCULATED.3IONS-SCNC: 10 MMOL/L (ref 5–15)
BACTERIA SPEC AEROBE CULT: ABNORMAL
BASOPHILS # BLD AUTO: 0 10*3/MM3 (ref 0–0.2)
BASOPHILS NFR BLD AUTO: 0.8 % (ref 0–1.5)
BUN SERPL-MCNC: 20 MG/DL (ref 8–23)
BUN SERPL-MCNC: ABNORMAL MG/DL
BUN/CREAT SERPL: ABNORMAL
CALCIUM SPEC-SCNC: 9 MG/DL (ref 8.6–10.5)
CHLORIDE SERPL-SCNC: 101 MMOL/L (ref 98–107)
CO2 SERPL-SCNC: 24 MMOL/L (ref 22–29)
CREAT SERPL-MCNC: 0.71 MG/DL (ref 0.76–1.27)
DEPRECATED RDW RBC AUTO: 50.8 FL (ref 37–54)
EOSINOPHIL # BLD AUTO: 0.1 10*3/MM3 (ref 0–0.4)
EOSINOPHIL NFR BLD AUTO: 2.7 % (ref 0.3–6.2)
ERYTHROCYTE [DISTWIDTH] IN BLOOD BY AUTOMATED COUNT: 16.6 % (ref 12.3–15.4)
GFR SERPL CREATININE-BSD FRML MDRD: 112 ML/MIN/1.73
GLUCOSE BLDC GLUCOMTR-MCNC: 115 MG/DL (ref 70–105)
GLUCOSE BLDC GLUCOMTR-MCNC: 85 MG/DL (ref 70–105)
GLUCOSE BLDC GLUCOMTR-MCNC: 92 MG/DL (ref 70–105)
GLUCOSE BLDC GLUCOMTR-MCNC: 92 MG/DL (ref 70–105)
GLUCOSE BLDC GLUCOMTR-MCNC: 99 MG/DL (ref 70–105)
GLUCOSE SERPL-MCNC: 169 MG/DL (ref 65–99)
GRAM STN SPEC: ABNORMAL
GRAM STN SPEC: ABNORMAL
HCT VFR BLD AUTO: 32.1 % (ref 37.5–51)
HGB BLD-MCNC: 10.6 G/DL (ref 13–17.7)
LYMPHOCYTES # BLD AUTO: 1.3 10*3/MM3 (ref 0.7–3.1)
LYMPHOCYTES NFR BLD AUTO: 23 % (ref 19.6–45.3)
MAGNESIUM SERPL-MCNC: 1.7 MG/DL (ref 1.6–2.4)
MCH RBC QN AUTO: 28.7 PG (ref 26.6–33)
MCHC RBC AUTO-ENTMCNC: 33 G/DL (ref 31.5–35.7)
MCV RBC AUTO: 86.9 FL (ref 79–97)
MONOCYTES # BLD AUTO: 0.9 10*3/MM3 (ref 0.1–0.9)
MONOCYTES NFR BLD AUTO: 17.1 % (ref 5–12)
NEUTROPHILS NFR BLD AUTO: 3.1 10*3/MM3 (ref 1.7–7)
NEUTROPHILS NFR BLD AUTO: 56.4 % (ref 42.7–76)
NRBC BLD AUTO-RTO: 0 /100 WBC (ref 0–0.2)
PLATELET # BLD AUTO: 175 10*3/MM3 (ref 140–450)
PMV BLD AUTO: 9 FL (ref 6–12)
POTASSIUM SERPL-SCNC: 4.3 MMOL/L (ref 3.5–5.2)
RBC # BLD AUTO: 3.69 10*6/MM3 (ref 4.14–5.8)
SODIUM SERPL-SCNC: 135 MMOL/L (ref 136–145)
VANCOMYCIN PEAK SERPL-MCNC: 23.9 MCG/ML (ref 20–40)
VANCOMYCIN TROUGH SERPL-MCNC: 15.1 MCG/ML (ref 5–20)
WBC # BLD AUTO: 5.5 10*3/MM3 (ref 3.4–10.8)

## 2020-07-17 PROCEDURE — 87116 MYCOBACTERIA CULTURE: CPT | Performed by: PODIATRIST

## 2020-07-17 PROCEDURE — 82962 GLUCOSE BLOOD TEST: CPT

## 2020-07-17 PROCEDURE — 80202 ASSAY OF VANCOMYCIN: CPT | Performed by: INTERNAL MEDICINE

## 2020-07-17 PROCEDURE — 25010000002 VANCOMYCIN 10 G RECONSTITUTED SOLUTION: Performed by: INTERNAL MEDICINE

## 2020-07-17 PROCEDURE — 83735 ASSAY OF MAGNESIUM: CPT | Performed by: NURSE PRACTITIONER

## 2020-07-17 PROCEDURE — 25010000002 PROPOFOL 10 MG/ML EMULSION: Performed by: ANESTHESIOLOGY

## 2020-07-17 PROCEDURE — 87070 CULTURE OTHR SPECIMN AEROBIC: CPT | Performed by: PODIATRIST

## 2020-07-17 PROCEDURE — 87206 SMEAR FLUORESCENT/ACID STAI: CPT | Performed by: PODIATRIST

## 2020-07-17 PROCEDURE — 0Y6P0Z0 DETACHMENT AT RIGHT 1ST TOE, COMPLETE, OPEN APPROACH: ICD-10-PCS | Performed by: PODIATRIST

## 2020-07-17 PROCEDURE — 87075 CULTR BACTERIA EXCEPT BLOOD: CPT | Performed by: PODIATRIST

## 2020-07-17 PROCEDURE — 87102 FUNGUS ISOLATION CULTURE: CPT | Performed by: INTERNAL MEDICINE

## 2020-07-17 PROCEDURE — 88311 DECALCIFY TISSUE: CPT | Performed by: PODIATRIST

## 2020-07-17 PROCEDURE — 88305 TISSUE EXAM BY PATHOLOGIST: CPT | Performed by: PODIATRIST

## 2020-07-17 PROCEDURE — 85025 COMPLETE CBC W/AUTO DIFF WBC: CPT | Performed by: INTERNAL MEDICINE

## 2020-07-17 PROCEDURE — 87205 SMEAR GRAM STAIN: CPT | Performed by: PODIATRIST

## 2020-07-17 PROCEDURE — 73630 X-RAY EXAM OF FOOT: CPT

## 2020-07-17 PROCEDURE — 99239 HOSP IP/OBS DSCHRG MGMT >30: CPT | Performed by: INTERNAL MEDICINE

## 2020-07-17 PROCEDURE — 80048 BASIC METABOLIC PNL TOTAL CA: CPT | Performed by: INTERNAL MEDICINE

## 2020-07-17 PROCEDURE — 25010000002 VANCOMYCIN 1 G RECONSTITUTED SOLUTION 1 EACH VIAL: Performed by: ANESTHESIOLOGY

## 2020-07-17 RX ORDER — HYDROCODONE BITARTRATE AND ACETAMINOPHEN 5; 325 MG/1; MG/1
1 TABLET ORAL ONCE AS NEEDED
Status: DISCONTINUED | OUTPATIENT
Start: 2020-07-17 | End: 2020-07-17 | Stop reason: HOSPADM

## 2020-07-17 RX ORDER — ACETAMINOPHEN 650 MG/1
650 SUPPOSITORY RECTAL ONCE AS NEEDED
Status: DISCONTINUED | OUTPATIENT
Start: 2020-07-17 | End: 2020-07-17 | Stop reason: HOSPADM

## 2020-07-17 RX ORDER — ONDANSETRON 2 MG/ML
4 INJECTION INTRAMUSCULAR; INTRAVENOUS ONCE AS NEEDED
Status: DISCONTINUED | OUTPATIENT
Start: 2020-07-17 | End: 2020-07-17 | Stop reason: HOSPADM

## 2020-07-17 RX ORDER — FLUMAZENIL 0.1 MG/ML
0.5 INJECTION INTRAVENOUS AS NEEDED
Status: DISCONTINUED | OUTPATIENT
Start: 2020-07-17 | End: 2020-07-17 | Stop reason: HOSPADM

## 2020-07-17 RX ORDER — SODIUM CHLORIDE 0.9 % (FLUSH) 0.9 %
10 SYRINGE (ML) INJECTION EVERY 12 HOURS SCHEDULED
Status: DISCONTINUED | OUTPATIENT
Start: 2020-07-17 | End: 2020-07-17

## 2020-07-17 RX ORDER — LORAZEPAM 2 MG/ML
0.5 INJECTION INTRAMUSCULAR
Status: DISCONTINUED | OUTPATIENT
Start: 2020-07-17 | End: 2020-07-17 | Stop reason: HOSPADM

## 2020-07-17 RX ORDER — NALOXONE HCL 0.4 MG/ML
0.4 VIAL (ML) INJECTION AS NEEDED
Status: DISCONTINUED | OUTPATIENT
Start: 2020-07-17 | End: 2020-07-17 | Stop reason: HOSPADM

## 2020-07-17 RX ORDER — DOXYCYCLINE 100 MG/1
100 TABLET ORAL EVERY 12 HOURS SCHEDULED
Status: DISCONTINUED | OUTPATIENT
Start: 2020-07-17 | End: 2020-07-17 | Stop reason: HOSPADM

## 2020-07-17 RX ORDER — PROMETHAZINE HYDROCHLORIDE 25 MG/ML
12.5 INJECTION, SOLUTION INTRAMUSCULAR; INTRAVENOUS ONCE AS NEEDED
Status: DISCONTINUED | OUTPATIENT
Start: 2020-07-17 | End: 2020-07-17 | Stop reason: HOSPADM

## 2020-07-17 RX ORDER — PROPOFOL 10 MG/ML
VIAL (ML) INTRAVENOUS AS NEEDED
Status: DISCONTINUED | OUTPATIENT
Start: 2020-07-17 | End: 2020-07-17 | Stop reason: SURG

## 2020-07-17 RX ORDER — PROMETHAZINE HYDROCHLORIDE 25 MG/1
25 TABLET ORAL ONCE AS NEEDED
Status: DISCONTINUED | OUTPATIENT
Start: 2020-07-17 | End: 2020-07-17 | Stop reason: HOSPADM

## 2020-07-17 RX ORDER — PROMETHAZINE HYDROCHLORIDE 25 MG/1
25 SUPPOSITORY RECTAL ONCE AS NEEDED
Status: DISCONTINUED | OUTPATIENT
Start: 2020-07-17 | End: 2020-07-17 | Stop reason: HOSPADM

## 2020-07-17 RX ORDER — SODIUM CHLORIDE 0.9 % (FLUSH) 0.9 %
10 SYRINGE (ML) INJECTION AS NEEDED
Status: DISCONTINUED | OUTPATIENT
Start: 2020-07-17 | End: 2020-07-17

## 2020-07-17 RX ORDER — ACETAMINOPHEN 325 MG/1
650 TABLET ORAL ONCE AS NEEDED
Status: DISCONTINUED | OUTPATIENT
Start: 2020-07-17 | End: 2020-07-17 | Stop reason: HOSPADM

## 2020-07-17 RX ORDER — MEPERIDINE HYDROCHLORIDE 25 MG/ML
12.5 INJECTION INTRAMUSCULAR; INTRAVENOUS; SUBCUTANEOUS
Status: DISCONTINUED | OUTPATIENT
Start: 2020-07-17 | End: 2020-07-17 | Stop reason: HOSPADM

## 2020-07-17 RX ORDER — MORPHINE SULFATE 4 MG/ML
2 INJECTION, SOLUTION INTRAMUSCULAR; INTRAVENOUS
Status: ACTIVE | OUTPATIENT
Start: 2020-07-17 | End: 2020-07-17

## 2020-07-17 RX ORDER — KETOROLAC TROMETHAMINE 15 MG/ML
15 INJECTION, SOLUTION INTRAMUSCULAR; INTRAVENOUS EVERY 6 HOURS PRN
Status: DISCONTINUED | OUTPATIENT
Start: 2020-07-17 | End: 2020-07-17 | Stop reason: HOSPADM

## 2020-07-17 RX ORDER — MIDAZOLAM HYDROCHLORIDE 1 MG/ML
1 INJECTION INTRAMUSCULAR; INTRAVENOUS
Status: DISCONTINUED | OUTPATIENT
Start: 2020-07-17 | End: 2020-07-17

## 2020-07-17 RX ORDER — DOXYCYCLINE 100 MG/1
100 TABLET ORAL EVERY 12 HOURS SCHEDULED
Qty: 28 TABLET | Refills: 0 | Status: SHIPPED | OUTPATIENT
Start: 2020-07-17 | End: 2020-07-31

## 2020-07-17 RX ORDER — BUPIVACAINE HYDROCHLORIDE 5 MG/ML
INJECTION, SOLUTION PERINEURAL AS NEEDED
Status: DISCONTINUED | OUTPATIENT
Start: 2020-07-17 | End: 2020-07-17 | Stop reason: HOSPADM

## 2020-07-17 RX ORDER — HYDROCODONE BITARTRATE AND ACETAMINOPHEN 10; 325 MG/1; MG/1
1 TABLET ORAL EVERY 6 HOURS PRN
Qty: 4 TABLET | Refills: 0 | Status: ON HOLD | OUTPATIENT
Start: 2020-07-17 | End: 2022-01-29 | Stop reason: SDUPTHER

## 2020-07-17 RX ORDER — SODIUM CHLORIDE, SODIUM LACTATE, POTASSIUM CHLORIDE, CALCIUM CHLORIDE 600; 310; 30; 20 MG/100ML; MG/100ML; MG/100ML; MG/100ML
9 INJECTION, SOLUTION INTRAVENOUS CONTINUOUS PRN
Status: DISCONTINUED | OUTPATIENT
Start: 2020-07-17 | End: 2020-07-17 | Stop reason: HOSPADM

## 2020-07-17 RX ADMIN — OXCARBAZEPINE 300 MG: 150 TABLET, FILM COATED ORAL at 13:18

## 2020-07-17 RX ADMIN — HYDRALAZINE HYDROCHLORIDE 50 MG: 25 TABLET, FILM COATED ORAL at 08:55

## 2020-07-17 RX ADMIN — HYDRALAZINE HYDROCHLORIDE 50 MG: 25 TABLET, FILM COATED ORAL at 18:07

## 2020-07-17 RX ADMIN — RISPERIDONE 0.5 MG: 0.25 TABLET ORAL at 13:19

## 2020-07-17 RX ADMIN — THERA TABS 1 TABLET: TAB at 13:20

## 2020-07-17 RX ADMIN — METOPROLOL SUCCINATE 25 MG: 25 TABLET, EXTENDED RELEASE ORAL at 08:55

## 2020-07-17 RX ADMIN — AMLODIPINE BESYLATE 5 MG: 5 TABLET ORAL at 08:55

## 2020-07-17 RX ADMIN — Medication 10 ML: at 13:21

## 2020-07-17 RX ADMIN — OXCARBAZEPINE 300 MG: 150 TABLET, FILM COATED ORAL at 18:07

## 2020-07-17 RX ADMIN — PANTOPRAZOLE SODIUM 40 MG: 40 TABLET, DELAYED RELEASE ORAL at 13:20

## 2020-07-17 RX ADMIN — MAGNESIUM OXIDE TAB 400 MG (241.3 MG ELEMENTAL MG) 400 MG: 400 (241.3 MG) TAB at 13:19

## 2020-07-17 RX ADMIN — VANCOMYCIN HYDROCHLORIDE 1250 MG: 1 INJECTION, POWDER, LYOPHILIZED, FOR SOLUTION INTRAVENOUS at 10:39

## 2020-07-17 RX ADMIN — PROPOFOL 100 MG: 10 INJECTION, EMULSION INTRAVENOUS at 10:28

## 2020-07-17 RX ADMIN — Medication 10 ML: at 08:55

## 2020-07-17 RX ADMIN — CLOPIDOGREL BISULFATE 75 MG: 75 TABLET ORAL at 13:20

## 2020-07-17 RX ADMIN — DULOXETINE 20 MG: 20 CAPSULE, DELAYED RELEASE PELLETS ORAL at 13:19

## 2020-07-17 RX ADMIN — HYDROCODONE BITARTRATE AND ACETAMINOPHEN 1 TABLET: 10; 325 TABLET ORAL at 13:19

## 2020-07-17 RX ADMIN — PROPOFOL 40 MG: 10 INJECTION, EMULSION INTRAVENOUS at 10:35

## 2020-07-17 RX ADMIN — PROPOFOL 40 MG: 10 INJECTION, EMULSION INTRAVENOUS at 10:31

## 2020-07-17 RX ADMIN — PROPOFOL 40 MG: 10 INJECTION, EMULSION INTRAVENOUS at 10:39

## 2020-07-17 RX ADMIN — Medication: at 12:39

## 2020-07-17 RX ADMIN — PREGABALIN 150 MG: 75 CAPSULE ORAL at 18:07

## 2020-07-17 RX ADMIN — SODIUM CHLORIDE TAB 1 GM 1 G: 1 TAB at 13:19

## 2020-07-17 RX ADMIN — FENOFIBRATE 145 MG: 145 TABLET ORAL at 13:19

## 2020-07-17 NOTE — ANESTHESIA PREPROCEDURE EVALUATION
Anesthesia Evaluation     Patient summary reviewed and Nursing notes reviewed   NPO Solid Status: > 6 hours  NPO Liquid Status: > 6 hours           Airway   Mallampati: II  TM distance: >3 FB  Neck ROM: full  No difficulty expected  Dental - normal exam     Pulmonary - negative pulmonary ROS and normal exam    breath sounds clear to auscultation  Cardiovascular - normal exam    ECG reviewed  Rhythm: regular  Rate: normal    (+) hypertension, CAD, PVD, hyperlipidemia,       Neuro/Psych  (+) CVA, numbness, psychiatric history,     GI/Hepatic/Renal/Endo    (+)   diabetes mellitus,     Musculoskeletal (-) negative ROS    Abdominal  - normal exam    Abdomen: soft.  Bowel sounds: normal.   Substance History - negative use     OB/GYN negative ob/gyn ROS         Other - negative ROS                       Anesthesia Plan    ASA 3     general     intravenous induction     Anesthetic plan, all risks, benefits, and alternatives have been provided, discussed and informed consent has been obtained with: patient.  Use of blood products discussed with patient .

## 2020-07-17 NOTE — DISCHARGE SUMMARY
Date of Admission: 7/14/2020    Date of Discharge:  7/17/2020    Length of stay:  LOS: 3 days     Presenting Problem:   Cellulitis of right toe [L03.031]      Principal and Active Diagnosis During Hospital Stay:     Active Hospital Problems    Diagnosis  POA   • **Diabetic foot ulcer (CMS/HCC) [E11.621, L97.509]  Yes   • Cellulitis of right toe [L03.031]  Yes   • Hyponatremia [E87.1]  Yes   • Hypomagnesemia [E83.42]  Yes   • Seasonal allergies [J30.2]  Yes   • Anxiety disorder [F41.9]  Yes   • RLS (restless legs syndrome) [G25.81]  Yes   • Peripheral neuropathy [G62.9]  Yes   • History of CVA (cerebrovascular accident) [Z86.73]  Not Applicable   • Diabetic ulcer of toe of right foot associated with type 2 diabetes mellitus, with bone involvement without evidence of necrosis (CMS/HCC) [E11.621, L97.516]  Unknown   • Bipolar affective disorder (CMS/HCC) [F31.9]  Yes   • Peripheral vascular disease (CMS/HCC) [I73.9]  Yes   • Anemia [D64.9]  Yes   • Coronary artery disease [I25.10]  Yes   • Diabetes mellitus (CMS/HCC) [E11.9]  Yes   • Hyperlipidemia [E78.5]  Yes   • Hypertension [I10]  Yes      Resolved Hospital Problems   No resolved problems to display.     Right great toe osteomyelitis  -S/p right great toe amputation 7/17/2020  -MRI and CTA lower extremity rewiewed  -Wound Cx growing Corynebacterium spp, awaiting final  -D/C on 2 weeks of doxycycline  -Podiatry follow-up 1 week  -ID followed  -vascular following, no plans for re-vascularization      Femoral-popliteal bypasses chronically occluded bilaterally with collateral circulation  -vascular plans as noted above     Diabetes Mellitus Type II with peripheral neuropathy   -home meds at d/c  -Continue lyrica (INSPECT verified)      CAD   -Continue Plavix, metoprolol , and statin     HLD   -Continue statin and tricor     Essential HTN   -Monitor blood pressure   -Uncontrolled   -Continue hydralazine and amlodipine  -IV hydralazine ordered PRN       Hospital  Course  Patient is a 60 y.o. male presented with flexion of the right great toe and was found to have osteomyelitis of the right great toe.  Was placed on antibiotics was seen by podiatry and vascular.  Vascular felt there was adequate circulation and then podiatry proceeded with right great toe amputation.  The patient did well after surgery was placed on oral antibiotics and felt stable to discharge back to rehab facility.    Procedures Performed:as noted     Procedure(s):  RIGHT GREAT TOE AMPUTATION  -------------------       Consults:   Consults     Date and Time Order Name Status Description    7/14/2020 1155 Inpatient Infectious Diseases Consult Completed     7/14/2020 1122 Inpatient Vascular Surgery Consult Completed           Pertinent Test Results:     Lab Results (last 72 hours)     Procedure Component Value Units Date/Time    Tissue Pathology Exam [[id number]] Collected:  07/17/20 1045    Specimen:  Tissue from Toe, Right Updated:  07/17/20 1250    POC Glucose Once [[id number]]  (Normal) Collected:  07/17/20 1205    Specimen:  Blood Updated:  07/17/20 1213     Glucose 99 mg/dL      Comment: Serial Number: [id number]:  [id number]       Wound Culture - Wound, Toe, Right [[id number]] Collected:  07/17/20 1042    Specimen:  Wound from Toe, Right Updated:  07/17/20 1147     Gram Stain Few (2+) WBCs per low power field      No organisms seen    POC Glucose Once [[id number]]  (Normal) Collected:  07/17/20 1120    Specimen:  Blood Updated:  07/17/20 1123     Glucose 85 mg/dL      Comment: Serial Number: [id number]:  [id number]       Fungus Culture - Tissue, Tonsil, Right [[id number]] Collected:  07/17/20 1051    Specimen:  Tissue from Tonsil, Right Updated:  07/17/20 1051    Anaerobic Culture - Wound, Toe, Right [[id number]] Collected:  07/17/20 1042    Specimen:  Wound from Toe, Right Updated:  07/17/20 1050    AFB Culture - Wound, Toe, Right [[id number]] Collected:  07/17/20 1042    Specimen:  Wound from  Toe, Right Updated:  07/17/20 1050    Wound Culture - Wound, Foot, Right [[id number]]  (Abnormal) Collected:  07/14/20 1242    Specimen:  Wound from Foot, Right Updated:  07/17/20 0931     Wound Culture Light growth (2+) Corynebacterium species     Comment:   No definitive guidelines. All are susceptible to vancomycin. Resistance to penicillins & cephalosporins does occur.        Gram Stain Few (2+) WBCs per low power field      No organisms seen    Vancomycin, Trough [[id number]]  (Normal) Collected:  07/17/20 0815    Specimen:  Blood Updated:  07/17/20 0915     Vancomycin Trough 15.10 mcg/mL     BUN [[id number]]  (Normal) Collected:  07/17/20 0018    Specimen:  Blood Updated:  07/17/20 0722     BUN 20 mg/dL     POC Glucose Once [[id number]]  (Normal) Collected:  07/17/20 0716    Specimen:  Blood Updated:  07/17/20 0720     Glucose 92 mg/dL      Comment: Serial Number: [id number]:  [id number]       POC Glucose Once [[id number]]  (Normal) Collected:  07/17/20 0612    Specimen:  Blood Updated:  07/17/20 0613     Glucose 92 mg/dL      Comment: Serial Number: [id number]:  [id number]       Vancomycin, Peak [[id number]]  (Normal) Collected:  07/17/20 0018    Specimen:  Blood Updated:  07/17/20 0101     Vancomycin Peak 23.90 mcg/mL     Magnesium [[id number]]  (Normal) Collected:  07/17/20 0018    Specimen:  Blood Updated:  07/17/20 0101     Magnesium 1.7 mg/dL     Basic Metabolic Panel [[id number]]  (Abnormal) Collected:  07/17/20 0018    Specimen:  Blood Updated:  07/17/20 0101     Glucose 169 mg/dL      BUN --     Comment: Testing performed by alternate method        Creatinine 0.71 mg/dL      Sodium 135 mmol/L      Potassium 4.3 mmol/L      Chloride 101 mmol/L      CO2 24.0 mmol/L      Calcium 9.0 mg/dL      eGFR Non African Amer 112 mL/min/1.73      BUN/Creatinine Ratio --     Comment: Testing not performed        Anion Gap 10.0 mmol/L     Narrative:       GFR Normal >60  Chronic Kidney Disease <60  Kidney Failure  <15      CBC & Differential [[id number]] Collected:  07/17/20 0018    Specimen:  Blood Updated:  07/17/20 0029    Narrative:       The following orders were created for panel order CBC & Differential.  Procedure                               Abnormality         Status                     ---------                               -----------         ------                     CBC Auto Differential[[id number]]        Abnormal            Final result                 Please view results for these tests on the individual orders.    CBC Auto Differential [[id number]]  (Abnormal) Collected:  07/17/20 0018    Specimen:  Blood Updated:  07/17/20 0029     WBC 5.50 10*3/mm3      RBC 3.69 10*6/mm3      Hemoglobin 10.6 g/dL      Hematocrit 32.1 %      MCV 86.9 fL      MCH 28.7 pg      MCHC 33.0 g/dL      RDW 16.6 %      RDW-SD 50.8 fl      MPV 9.0 fL      Platelets 175 10*3/mm3      Neutrophil % 56.4 %      Lymphocyte % 23.0 %      Monocyte % 17.1 %      Eosinophil % 2.7 %      Basophil % 0.8 %      Neutrophils, Absolute 3.10 10*3/mm3      Lymphocytes, Absolute 1.30 10*3/mm3      Monocytes, Absolute 0.90 10*3/mm3      Eosinophils, Absolute 0.10 10*3/mm3      Basophils, Absolute 0.00 10*3/mm3      nRBC 0.0 /100 WBC     POC Glucose Once [[id number]]  (Abnormal) Collected:  07/16/20 2007    Specimen:  Blood Updated:  07/16/20 2009     Glucose 149 mg/dL      Comment: Serial Number: [id number]:  [id number]       POC Glucose Once [[id number]]  (Abnormal) Collected:  07/16/20 1708    Specimen:  Blood Updated:  07/16/20 1711     Glucose 130 mg/dL      Comment: Serial Number: [id number]:  [id number]       BUN [[id number]]  (Normal) Collected:  07/16/20 0354    Specimen:  Blood Updated:  07/16/20 0849     BUN 16 mg/dL     Vancomycin, Trough [[id number]]  (Normal) Collected:  07/16/20 0354    Specimen:  Blood Updated:  07/16/20 0557     Vancomycin Trough 16.50 mcg/mL     Magnesium [[id number]]  (Abnormal) Collected:  07/16/20 0354     Specimen:  Blood Updated:  07/16/20 0557     Magnesium 1.4 mg/dL     Comprehensive Metabolic Panel [[id number]]  (Abnormal) Collected:  07/16/20 0354    Specimen:  Blood Updated:  07/16/20 0557     Glucose 93 mg/dL      BUN --     Comment: Testing performed by alternate method        Creatinine 0.83 mg/dL      Sodium 132 mmol/L      Potassium 3.9 mmol/L      Chloride 97 mmol/L      CO2 25.0 mmol/L      Calcium 9.1 mg/dL      Total Protein 5.3 g/dL      Albumin 3.50 g/dL      ALT (SGPT) 17 U/L      AST (SGOT) 15 U/L      Alkaline Phosphatase 35 U/L      Total Bilirubin 0.3 mg/dL      eGFR Non African Amer 94 mL/min/1.73      Globulin 1.8 gm/dL      A/G Ratio 1.9 g/dL      BUN/Creatinine Ratio --     Comment: Testing not performed        Anion Gap 10.0 mmol/L     Narrative:       GFR Normal >60  Chronic Kidney Disease <60  Kidney Failure <15      CBC & Differential [[id number]] Collected:  07/16/20 0354    Specimen:  Blood Updated:  07/16/20 0530    Narrative:       The following orders were created for panel order CBC & Differential.  Procedure                               Abnormality         Status                     ---------                               -----------         ------                     CBC Auto Differential[[id number]]        Abnormal            Final result                 Please view results for these tests on the individual orders.    CBC Auto Differential [[id number]]  (Abnormal) Collected:  07/16/20 0354    Specimen:  Blood Updated:  07/16/20 0530     WBC 5.00 10*3/mm3      RBC 3.67 10*6/mm3      Hemoglobin 10.5 g/dL      Hematocrit 31.4 %      MCV 85.6 fL      MCH 28.7 pg      MCHC 33.6 g/dL      RDW 16.8 %      RDW-SD 51.2 fl      MPV 9.6 fL      Platelets 160 10*3/mm3      Neutrophil % 53.8 %      Lymphocyte % 27.5 %      Monocyte % 15.8 %      Eosinophil % 2.0 %      Basophil % 0.9 %      Neutrophils, Absolute 2.70 10*3/mm3      Lymphocytes, Absolute 1.40 10*3/mm3      Monocytes, Absolute  0.80 10*3/mm3      Eosinophils, Absolute 0.10 10*3/mm3      Basophils, Absolute 0.00 10*3/mm3      nRBC 0.0 /100 WBC     Vancomycin, Peak [[id number]]  (Abnormal) Collected:  07/15/20 2047    Specimen:  Blood Updated:  07/15/20 2140     Vancomycin Peak 9.60 mcg/mL     BUN [[id number]]  (Normal) Collected:  07/15/20 0406    Specimen:  Blood Updated:  07/15/20 0711     BUN 12 mg/dL     Magnesium [[id number]]  (Abnormal) Collected:  07/15/20 0406    Specimen:  Blood Updated:  07/15/20 0451     Magnesium 1.2 mg/dL     Comprehensive Metabolic Panel [[id number]]  (Abnormal) Collected:  07/15/20 0406    Specimen:  Blood Updated:  07/15/20 0451     Glucose 104 mg/dL      BUN --     Comment: Testing performed by alternate method        Creatinine 0.79 mg/dL      Sodium 132 mmol/L      Potassium 3.9 mmol/L      Chloride 96 mmol/L      CO2 26.0 mmol/L      Calcium 9.2 mg/dL      Total Protein 5.2 g/dL      Albumin 3.60 g/dL      ALT (SGPT) 17 U/L      AST (SGOT) 14 U/L      Alkaline Phosphatase 37 U/L      Total Bilirubin 0.2 mg/dL      eGFR Non African Amer 99 mL/min/1.73      Globulin 1.6 gm/dL      A/G Ratio 2.3 g/dL      BUN/Creatinine Ratio --     Comment: Testing not performed        Anion Gap 10.0 mmol/L     Narrative:       GFR Normal >60  Chronic Kidney Disease <60  Kidney Failure <15      CBC Auto Differential [[id number]]  (Abnormal) Collected:  07/15/20 0406    Specimen:  Blood Updated:  07/15/20 0428     WBC 6.00 10*3/mm3      RBC 3.55 10*6/mm3      Hemoglobin 10.5 g/dL      Hematocrit 30.4 %      MCV 85.7 fL      MCH 29.5 pg      MCHC 34.4 g/dL      RDW 16.8 %      RDW-SD 50.3 fl      MPV 9.4 fL      Platelets 157 10*3/mm3      Neutrophil % 59.9 %      Lymphocyte % 25.0 %      Monocyte % 12.7 %      Eosinophil % 1.8 %      Basophil % 0.6 %      Neutrophils, Absolute 3.60 10*3/mm3      Lymphocytes, Absolute 1.50 10*3/mm3      Monocytes, Absolute 0.80 10*3/mm3      Eosinophils, Absolute 0.10 10*3/mm3      Basophils,  Absolute 0.00 10*3/mm3      nRBC 0.0 /100 WBC     Hemoglobin A1c [[id number]]  (Normal) Collected:  07/14/20 1216    Specimen:  Blood Updated:  07/14/20 1649     Hemoglobin A1C 5.4 %     Narrative:       Hemoglobin A1C Reference Range:    <5.7 %        Normal  5.7-6.4 %     Increased risk for diabetes  > 6.4 %        Diabetes       These guidelines have been recommended by the American Diabetic Association for Hgb A1c.      The following 2010 guidelines have been recommended by the American Diabetes Association for Hemoglobin A1c.    HBA1c 5.7-6.4% Increased risk for future diabetes (pre-diabetes)  HBA1c     >6.4% Diabetes      BUN [[id number]]  (Normal) Collected:  07/14/20 1216    Specimen:  Blood Updated:  07/14/20 1330     BUN 16 mg/dL                Microbiology Results (last 10 days)     Procedure Component Value - Date/Time    Wound Culture - Wound, Toe, Right [[id number]] Collected:  07/17/20 1042    Lab Status:  Preliminary result Specimen:  Wound from Toe, Right Updated:  07/17/20 1147     Gram Stain Few (2+) WBCs per low power field      No organisms seen    Wound Culture - Wound, Foot, Right [[id number]]  (Abnormal) Collected:  07/14/20 1242    Lab Status:  Final result Specimen:  Wound from Foot, Right Updated:  07/17/20 0931     Wound Culture Light growth (2+) Corynebacterium species     Comment:   No definitive guidelines. All are susceptible to vancomycin. Resistance to penicillins & cephalosporins does occur.        Gram Stain Few (2+) WBCs per low power field      No organisms seen                 Imaging Results (All)     Procedure Component Value Units Date/Time    CT Angio Abdominal Aorta Bilateral Iliofem Runoff [[id number]] Collected:  07/15/20 1026     Updated:  07/15/20 1052    Narrative:          DATE OF EXAM:  7/14/2020 4:08 PM     PROCEDURE:  CT ANGIO ABDOMINAL AORTA BILAT ILIOFEM RUNOFF-     INDICATIONS:   Claudication, diabetes mellitus, right great toe ulcer, evaluate for  surgical  revascularization.     COMPARISON:   MRI right foot performed on 7/14/2020.     TECHNIQUE:  Routine transaxial slices were obtained through the abdomen, pelvis, and  both lower extremities after the intravenous administration of 100 mL  Isovue 370. Reconstructed coronal and sagittal images were also  obtained. In addition, a 3 D volume rendered image was obtained after  post processing. Automated exposure control and iterative reconstruction  methods were used.     FINDINGS:  Vascular findings: There is diffuse calcified atheromatous plaque  throughout the abdomen and pelvis. There is no abdominal aortic aneurysm  or dissection. There is no significant aortoiliac occlusive disease. The  celiac artery is patent. The superior mesenteric artery is also patent.  There is a single patent right renal artery and there are two patent  left renal arteries. The inferior mesenteric artery is chronically  occluded at its origin. There is ectasia of the right common femoral  artery with surgery clips indicating previous bypass graft placement.  The actual bypass graft is not seen well but is occluded. There is also  chronic occlusion of the native right superficial femoral and proximal  popliteal arteries. The right profunda femoral artery is patent. There  are numerous collaterals which reconstitute flow into the mid right  popliteal artery. There is heavily calcified plaque in the in the right  tibial peroneal trunk and throughout most of the right posterior tibial  artery. There is also heavily calcified plaque in the proximal right  anterior tibial artery. There are focal high-grade stenotic areas in the  region of the heavily calcified plaque. There is visualization of the  right posterior tibial artery in the foot indicating patency. The right  anterior tibial artery terminates just below the ankle joint with most  of the right dorsalis pedis artery chronically occluded. There is  ectasia of the left common femoral artery  with adjacent surgical clips  indicating previous bypass graft placement. The actual bypass graft is  not seen well but is occluded. There is also chronic occlusion of the  native left superficial femoral and a popliteal arteries. The patient  has a patent left profunda femoral artery with multiple collateral  vessels throughout the left thigh, knee, and proximal calf. The patient  has severe infrapopliteal small vessel disease in the left lower  extremity. The proximal tibial peroneal trunk is occluded secondary to  calcified plaque. The proximal left anterior tibial artery is also  occluded due to calcified plaque. The left posterior tibial artery is  chronically occluded throughout the calf. There is reconstitution of  flow within the proximal left anterior tibial and peroneal arteries via  small collaterals. There is reconstitution of flow of the left posterior  tibial artery in the hindfoot via a collateral branch from the distal  peroneal artery. The left anterior tibial artery terminates just below  the ankle joint with only a short segment of the left dorsalis pedis  artery visualized. The remaining left dorsalis pedis artery is  chronically occluded.     Nonvascular findings: There are calcified granulomas in the right lower  lobe. There is increased stool throughout the colon and rectum  indicating fecal impaction. There are no acute findings within the  abdomen or pelvis. The patient has a right great toe ulcer. The patient  had an abnormal MRI of the right foot indicating osteomyelitis of the  proximal phalanx and distal phalanx. This is not seen well on the CT  exam. There are no definite osteolytic or sclerotic lesions within the  bony structures. The patient has an old healed fracture of the left  inferior pubic ramus. There are old healed L2 and L4 compression  fractures.          Impression:          1. No aortoiliac occlusive disease.  2. It appears that the patient's had previous femoral popliteal  bypass  graft placement. The grafts are not seen well, but are clearly  chronically occluded. There is also chronic occlusion of the native  superficial femoral arteries and popliteal arteries as described above.  3. There is a infrapopliteal small vessel disease, left greater than  right side.  4. Additional vascular and nonvascular findings as noted above.     Electronically Signed By-Noam Soares On:7/15/2020 10:50 AM  This report was finalized on [id number] by  Noam Soares, .    MRI Foot Right With & Without Contrast [[id number]] Collected:  07/14/20 1609     Updated:  07/14/20 1616    Narrative:       MRI FOOT RIGHT W WO CONTRAST    Date of Exam: 7/14/2020 15:10 EDT    Indication: Osteomyelitis suspected, foot swelling, diabetic.  Diabetic ulcer dorsal right great toe.     Comparison Exams: None available.    Technique: Prior to and after uneventful administration of 16 cc ProHance IV contrast, multiplanar multisequence images of the foot performed according to routine foot MRI protocol.    FINDINGS:  There is homogeneous low T1 high T2 signal intensity involving the distal one half of the proximal phalanx of the first digit. This involves the distal 1.8 cm. There is some possible loss of cortex along the medial aspect of the distal proximal phalanx.   There is an ulceration along the medial dorsal aspect of the toe in this location. This is consistent with osteomyelitis. There is more heterogeneous low T1 high T2 signal intensity involving the base of the distal phalanx of the great toe measuring   about 7 mm from proximal to distal. No drainable fluid collection to suggest abscess is seen. There is diffuse subcutaneous edema of the foot more pronounced lateral than medial.    Mild muscular atrophy is present. No intermetatarsal masses are seen. Mild joint space narrowing first metatarsophalangeal joint. Hallux sesamoid bones have normal size, position and signal intensity. There is increased enhancement of  the marrow   involving the proximal phalanx and base of distal phalanx of the great toe. The remainder of the enhancement is physiologic.      Impression:       1.Osteomyelitis of the proximal phalanx of the great toe and proximal one half of the distal phalanx great toe with ulceration along the medial dorsal cortex.  2.No drainable fluid collection to suggest osteomyelitis.  3.Cellulitis of the great toe.    Electronically Signed: Jessica Morales MD 7/14/2020 16:14 EDT    XR Chest 1 View [[id number]] Collected:  07/14/20 1206     Updated:  07/14/20 1208    Narrative:       DATE OF EXAM:  7/14/2020 11:51 AM     PROCEDURE:  XR CHEST 1 VW-     INDICATIONS:  new admit     COMPARISON:  No Comparisons Available     TECHNIQUE:   Single radiographic AP view of the chest was obtained.     FINDINGS:  The heart size appears normal. Lungs show granulomatous calcifications  in the right base. No acute infiltrates are identified.        Impression:       Old healed granulomatous disease. No acute process identified in the  chest     Electronically Signed By-Melo Sims On:7/14/2020 12:06 PM  This report was finalized on [id number] by  Melo Sims, .            Condition on Discharge:  Stable     Vital Signs  Temp:  [97 °F (36.1 °C)-98.8 °F (37.1 °C)] 97.8 °F (36.6 °C)  Heart Rate:  [60-74] 69  Resp:  [8-18] 15  BP: ()/(55-90) 137/68    Physical Exam:  Physical Exam   Constitutional: No distress.   Cardiovascular: Normal rate and regular rhythm.   Pulmonary/Chest: Effort normal. No respiratory distress.   Abdominal: Soft. He exhibits no distension.   Musculoskeletal:   Right foot with dressing and boot in place.   Skin: Skin is warm.   Psychiatric: He has a normal mood and affect.   Nursing note and vitals reviewed.      Discharge Disposition  Rehab Facility or Unit (DC - External)    Discharge Medications     Discharge Medications      New Medications      Instructions Start Date   doxycycline 100 MG tablet  Commonly known  as:  ADOXA   100 mg, Oral, Every 12 Hours Scheduled         Changes to Medications      Instructions Start Date   HYDROcodone-acetaminophen  MG per tablet  Commonly known as:  Norco  What changed:  reasons to take this   1 tablet, Oral, Every 6 Hours PRN         Continue These Medications      Instructions Start Date   acetaminophen 325 MG tablet  Commonly known as:  TYLENOL   650 mg, Oral, Every 6 Hours PRN      amLODIPine 5 MG tablet  Commonly known as:  NORVASC   5 mg, Oral, Daily      atorvastatin 80 MG tablet  Commonly known as:  LIPITOR   80 mg, Oral, Nightly      Catapres 0.2 MG tablet  Generic drug:  cloNIDine   0.1 mg, Oral, 3 Times Daily PRN      cetirizine 10 MG tablet  Commonly known as:  zyrTEC   10 mg, Oral, Nightly      Colace 100 MG capsule  Generic drug:  docusate sodium   100 mg, Oral, 2 Times Daily      CORICIDIN 2-325 MG tablet  Generic drug:  Chlorpheniramine-Acetaminophen   Oral      dicyclomine 10 MG capsule  Commonly known as:  BENTYL   10 mg, Oral, Every 6 Hours PRN      DULoxetine 20 MG capsule  Commonly known as:  CYMBALTA   20 mg, Oral, Daily      hydrALAZINE 50 MG tablet  Commonly known as:  APRESOLINE   3 Times Daily      loperamide 2 MG capsule  Commonly known as:  IMODIUM   2 mg, Oral, 4 Times Daily PRN      Lyrica 50 MG capsule  Generic drug:  pregabalin   150 mg, 3 Times Daily      MAGnesium-Oxide 400 (241.3 Mg) MG tablet tablet  Generic drug:  magnesium oxide   MAGNESIUM-OXIDE 400 (241.3 Mg) MG TABS      metFORMIN 500 MG tablet  Commonly known as:  GLUCOPHAGE   500 mg, Oral, 2 Times Daily With Meals      metoprolol succinate XL 25 MG 24 hr tablet  Commonly known as:  TOPROL-XL   25 mg, Oral, Daily      multivitamin with minerals tablet tablet   1 tablet, Oral, Daily      Nitrolingual 0.4 MG/SPRAY spray  Generic drug:  nitroglycerin   NITROLINGUAL 0.4 MG/SPRAY SOLN      OXcarbazepine 300 MG tablet  Commonly known as:  TRILEPTAL   300 mg, Oral, 3 Times Daily      OXcarbazepine  300 MG tablet  Commonly known as:  TRILEPTAL   150 mg, Oral, Nightly      pantoprazole 40 MG EC tablet  Commonly known as:  PROTONIX   40 mg, Oral, Daily      Plavix 75 MG tablet  Generic drug:  clopidogrel   75 mg, Oral, Daily      predniSONE 5 MG tablet  Commonly known as:  DELTASONE   5 mg, Oral, Daily      promethazine 12.5 MG tablet  Commonly known as:  PHENERGAN   12.5 mg, Oral, Every 6 Hours PRN      risperiDONE 0.5 MG tablet  Commonly known as:  risperDAL   0.5 mg, Oral, 2 Times Daily      sodium chloride 1 g tablet   1 g, Oral, 2 times daily      traZODone 50 MG tablet  Commonly known as:  DESYREL   100 mg, Oral, Nightly      Tricor 145 MG tablet  Generic drug:  fenofibrate   145 mg, Oral, Daily             Discharge Diet:   Diet Instructions     Diet: Consistent Carbohydrate      Discharge Diet:  Consistent Carbohydrate          Activity at Discharge:     Follow-up Appointments  Future Appointments   Date Time Provider Department Center   2/3/2021 12:50 PM Sharath Conklin MD [facility]     Additional Instructions for the Follow-ups that You Need to Schedule     Discharge Follow-up with Specified Provider: podiatry; 1 Week   As directed      To:  podiatry    Follow Up:  1 Week         Discharge Follow-up with Specified Provider: vascular surgery; 1 Month   As directed      To:  vascular surgery    Follow Up:  1 Month               Test Results Pending at Discharge   Order Current Status    AFB Culture - Wound, Toe, Right In process    Anaerobic Culture - Wound, Toe, Right In process    Fungus Culture - Tissue, Tonsil, Right In process    Tissue Pathology Exam In process    Wound Culture - Wound, Toe, Right Preliminary result           Risk for Readmission (LACE) Score: 10 (7/17/2020  6:01 AM)      This patient has been examined wearing appropriate Personal Protective Equipment . 07/17/20      Time: Discharge 38 min with face-to-face history exam, writing of prescriptions, and documenting  discharge data including care coordination with the nursing staff.      Jac Sheikh DO  07/17/20  13:26

## 2020-07-17 NOTE — PROGRESS NOTES
Infectious Diseases Progress Note      LOS: 3 days   Patient Care Team:  Wang Johnson MD as PCP - General  Sharath Conklin MD as Consulting Physician (Cardiology)    Chief Complaint: Right foot pain    Subjective       The patient has been afebrile for the last 24 hours.  The patient is on room air, hemodynamically stable, and is tolerating antimicrobial therapy.      Review of Systems:   Review of Systems   Constitutional: Negative.    HENT: Negative.    Respiratory: Negative.    Cardiovascular: Negative.    Gastrointestinal: Negative.    Genitourinary: Negative.    Musculoskeletal:        Right foot pain   Skin: Positive for wound.   Neurological: Negative.    Psychiatric/Behavioral: Negative.         Objective     Vital Signs  Temp:  [97 °F (36.1 °C)-98.8 °F (37.1 °C)] 97.8 °F (36.6 °C)  Heart Rate:  [60-74] 69  Resp:  [8-18] 15  BP: ()/(55-90) 137/68    Physical Exam:  Physical Exam   Constitutional: He is oriented to person, place, and time. He appears well-developed and well-nourished.   HENT:   Head: Normocephalic and atraumatic.   Eyes: Pupils are equal, round, and reactive to light. Conjunctivae and EOM are normal.   Neck: Neck supple.   Cardiovascular: Normal rate, regular rhythm and normal heart sounds.   Pulmonary/Chest: Effort normal and breath sounds normal.   Abdominal: Soft. Bowel sounds are normal.   Musculoskeletal: Normal range of motion.   Neurological: He is alert and oriented to person, place, and time.   Skin: Skin is warm and dry.   Right foot dressing is clean and dry   Psychiatric: He has a normal mood and affect.   Vitals reviewed.       Results Review:    I have reviewed all clinical data, test, lab, and imaging results.     Radiology  No Radiology Exams Resulted Within Past 24 Hours    Cardiology    Laboratory    Results from last 7 days   Lab Units 07/17/20  0018 07/16/20  0354 07/15/20  0406 07/14/20  1216   WBC 10*3/mm3 5.50 5.00 6.00 7.50   HEMOGLOBIN g/dL 10.6* 10.5* 10.5*  11.0*   HEMATOCRIT % 32.1* 31.4* 30.4* 31.8*   PLATELETS 10*3/mm3 175 160 157 157     Results from last 7 days   Lab Units 07/17/20  0018 07/16/20  0354 07/15/20  0406 07/14/20  1216   SODIUM mmol/L 135* 132* 132* 131*   POTASSIUM mmol/L 4.3 3.9 3.9 4.0   CHLORIDE mmol/L 101 97* 96* 95*   CO2 mmol/L 24.0 25.0 26.0 26.0   BUN  20 16 12 16   CREATININE mg/dL 0.71* 0.83 0.79 0.73*   GLUCOSE mg/dL 169* 93 104* 80   ALBUMIN g/dL  --  3.50 3.60 4.00   BILIRUBIN mg/dL  --  0.3 0.2 0.2   ALK PHOS U/L  --  35* 37* 43   AST (SGOT) U/L  --  15 14 19   ALT (SGPT) U/L  --  17 17 21   CALCIUM mg/dL 9.0 9.1 9.2 9.2         Results from last 7 days   Lab Units 07/14/20  1216   SED RATE mm/hr 10         Microbiology   Microbiology Results (last 10 days)     Procedure Component Value - Date/Time    Wound Culture - Wound, Toe, Right [[id number]] Collected:  07/17/20 1042    Lab Status:  Preliminary result Specimen:  Wound from Toe, Right Updated:  07/17/20 1147     Gram Stain Few (2+) WBCs per low power field      No organisms seen    Wound Culture - Wound, Foot, Right [[id number]]  (Abnormal) Collected:  07/14/20 1242    Lab Status:  Final result Specimen:  Wound from Foot, Right Updated:  07/17/20 0931     Wound Culture Light growth (2+) Corynebacterium species     Comment:   No definitive guidelines. All are susceptible to vancomycin. Resistance to penicillins & cephalosporins does occur.        Gram Stain Few (2+) WBCs per low power field      No organisms seen          Medication Review:       Schedule Meds    amLODIPine 5 mg Oral Daily   atorvastatin 80 mg Oral Nightly   cetirizine 10 mg Oral Nightly   clopidogrel 75 mg Oral Daily   docusate sodium 100 mg Oral BID   doxycycline 100 mg Oral Q12H   DULoxetine 20 mg Oral Daily   fenofibrate 145 mg Oral Daily   hydrALAZINE 50 mg Oral TID   insulin lispro 0-7 Units Subcutaneous TID AC   magnesium oxide 400 mg Oral Daily   metoprolol succinate XL 25 mg Oral Daily   OXcarbazepine 150 mg  Oral Nightly   OXcarbazepine 300 mg Oral TID - Nitrates   pantoprazole 40 mg Oral Daily   pregabalin 150 mg Oral TID   risperiDONE 0.5 mg Oral BID   sodium chloride 10 mL Intravenous Q12H   sodium chloride 1 g Oral BID   Thera 1 tablet Oral Daily   traZODone 100 mg Oral Nightly       Infusion Meds    lactated ringers 9 mL/hr       PRN Meds  •  acetaminophen **OR** acetaminophen **OR** acetaminophen  •  acetaminophen **OR** acetaminophen  •  dextrose  •  dextrose  •  flumazenil  •  glucagon (human recombinant)  •  hydrALAZINE  •  HYDROcodone-acetaminophen  •  HYDROcodone-acetaminophen  •  insulin lispro **AND** insulin lispro  •  ketorolac  •  lactated ringers  •  LORazepam  •  magnesium sulfate **OR** magnesium sulfate in D5W 1g/100mL (PREMIX)  •  meperidine  •  naloxone  •  ondansetron **OR** ondansetron  •  ondansetron  •  potassium chloride  •  promethazine **OR** promethazine **OR** promethazine **OR** promethazine  •  sodium chloride        Assessment/Plan       Antimicrobial Therapy   1.  IV vancomycin      day  2.  IV cefepime      day  3.      Day  4.      Day  5.      Day      Assessment     Right diabetic foot with osteomyelitis of the right big toe involving the proximal and the distal phalanx.  Wound cultures are growing Corynebacterium  The patient is status post to amputation in OR earlier today by podiatry service.  The case was discussed with Dr. Garcia from podiatry service and there was no residual infection after the toe amputation.     Diabetes mellitus with diabetic neuropathy-A1c is 5.4     Peripheral vascular disease.  Patient status post bilateral leg bypass surgery few years ago.  The patient is being followed by vascular surgery service and was felt to have good blood flow to heal amputation of the toe  -Vascular surgery has no revascularization plans at this time     Plan     Okay to discharge patient at any time  Discontinue IV antibiotics  Doxycycline 100 mg p.o. twice daily for 14  days  Case was discussed with podiatry service    Marcia Zhu MD  07/17/20  15:12     Note is dictated utilizing voice recognition software/Dragon

## 2020-07-17 NOTE — PLAN OF CARE
Patient is alert and oriented. He had a right greater toe amputation. Patient receiving IV antibiotics. No other complaints or concerns noted. Will continue to monitor.

## 2020-07-17 NOTE — PLAN OF CARE
Problem: Patient Care Overview  Goal: Plan of Care Review  Outcome: Ongoing (interventions implemented as appropriate)  Flowsheets (Taken 7/17/2020 7715)  Progress: no change  Plan of Care Reviewed With: patient  Outcome Summary: Patient has rested well throughout the night with no complaints. Patient has remained NPO since midnight for surgery today. Will continue to monitor.

## 2020-07-17 NOTE — OP NOTE
AMPUTATION FOOT  Procedure Report    Patient Name:  Emery Nesbitt  YOB: 1960    Date of Surgery:  7/17/2020     Indications: Osteomyelitis right great toe    Pre-op Diagnosis:   Peripheral vascular disease (CMS/[facility]) [I73.9]  Diabetic ulcer of toe of right foot associated with type 2 diabetes mellitus, with bone involvement without evidence of necrosis (CMS/HCC) [E11.621, L97.516]       Post-Op Diagnosis Codes:     * Peripheral vascular disease (CMS/HCC) [I73.9]     * Diabetic ulcer of toe of right foot associated with type 2 diabetes mellitus, with bone involvement without evidence of necrosis (CMS/HCC) [E11.621, L97.516]    Procedure/CPT® Codes:      Procedure(s):  RIGHT GREAT TOE AMPUTATION    Staff:  Surgeon(s):  Will Garcia DPM           Anesthesia: Monitored Anesthesia Care    Estimated Blood Loss: Less than 5 cc  Implants:    Nothing was implanted during the procedure        Findings: Necrotic bone with ulcerated ulcer right great toe  Complications: None    Description of Procedure: Patient has a longstanding history of an ulceration to his right great toe along with peripheral vascular disease.  Patient was admitted for cellulitis of his right hallux and an MRI was ordered which confirmed osteomyelitis of the proximal phalanx and the distal phalanx.  Culture of the toe showed he was growing Corne bacterium.  ABIs and toe pressures were suggestive of healing but close follow-up was recommended.  Patient was told the risk of surgery include but not limited to bleeding infection nerve damage loss of limb loss of life.  All questions were answered for the patient no guarantees given or implied about the outcome of the procedure.  A consent was signed and placed in the chart.  Patient wants to proceed with surgery.     Patient was brought back to the operating room placed on the operating table in the supine position where pneumatic ankle tourniquet was placed about the right ankle.   Timeout was done at verify the correct extremity and the procedure to be performed.  Once this was confirmed the right hallux was anesthetized with an equal mixture of .25 percent Marcaine plain and 1% lidocaine plain 10 cc were used.  Right foot was then scrubbed prepped and draped in the usual aseptic manner.  Procedure #1 amputation right great toe at the first MPJ  Attention was then directed to the first MPJ where 2 fishmouth incisions were made anterior posterior full-thickness flaps were maintained.  Incision was made straight down to bone.  Good bleeding was noted upon the incision.  Flaps were then peeled proximally until the first MPJ was identified.  At this point utilizing 15 blade the great toe was transected and passed from the operating site.  It should be noted that necrosis of the bone was identified in the proximal phalanx.  The head of the first metatarsal appeared to be healthy and firm.  Any unhealthy tissue was removed.  Culture of the great toe was also taken including Gram stain aerobic anaerobic acid-fast and fungal.  Wound was then flushed with 1 L of normal sterile saline.  Any oozing was cauterized with the Bovie.  Closure consisted of 3-0 Vicryl deep and then a combination of 3-0 and 4-0 nylon for the skin.  A postoperative block 10 cc of quarter percent Marcaine was then infiltrated about the first ray.  Dressing was then applied consisting of Betadine Adaptic 4 x 4's, abd,  Kerlix and Ace bandage.  Surgical shoe was then placed on the patient's right foot.  Patient tolerated procedure and anesthesia well.  Patient was transferred to postop recovery once stable he will return to the floor.  From a podiatry standpoint the plan will be to keep the dressing intact and can follow-up early next week for dressing change.  Okay from podiatry standpoint to discharge later today if doing well or early tomorrow if okay with infectious disease.  On discharge antibiotics per infectious  disease    Will Garcia DPM     Date: 7/17/2020  Time: 11:13

## 2020-07-17 NOTE — PROGRESS NOTES
Case Management Discharge Note      Final Note: [facility] resident, but will return skilled initially.     Provided Post Acute Provider List?: N/A  N/A Provider List Comment: from [facility] and wants to return     Destination - Selection Complete      Service Provider Request Status Selected Services Address Phone Number Fax Number    [facility] Skilled Nursing [address] [place] [phone]             Final Discharge Disposition Code: 03 - skilled nursing facility (SNF)

## 2020-07-17 NOTE — ANESTHESIA POSTPROCEDURE EVALUATION
Patient: Emery Nesbitt    Procedure Summary     Date:  07/17/20 Room / Location:  [facility] OR 06 / [facility] MAIN OR    Anesthesia Start:  1022 Anesthesia Stop:  1111    Procedure:  RIGHT GREAT TOE AMPUTATION (Right Foot) Diagnosis:       Peripheral vascular disease (CMS/HCC)      Diabetic ulcer of toe of right foot associated with type 2 diabetes mellitus, with bone involvement without evidence of necrosis (CMS/HCC)      (Peripheral vascular disease (CMS/HCC) [I73.9])      (Diabetic ulcer of toe of right foot associated with type 2 diabetes mellitus, with bone involvement without evidence of necrosis (CMS/HCC) [E11.621, L97.516])    Surgeon:  Will Garcia DPM Provider:  Wong Small MD    Anesthesia Type:  general ASA Status:  3          Anesthesia Type: general    Vitals  Vitals Value Taken Time   /75 7/17/2020 11:12 AM   Temp     Pulse 83 7/17/2020 11:12 AM   Resp     SpO2     Vitals shown include unvalidated device data.        Post Anesthesia Care and Evaluation    Patient location during evaluation: bedside  Patient participation: complete - patient participated  Level of consciousness: awake and alert  Pain score: 1  Pain management: adequate  Airway patency: patent  Anesthetic complications: No anesthetic complications  PONV Status: none  Cardiovascular status: acceptable  Respiratory status: acceptable  Hydration status: acceptable  Post Neuraxial Block status: Motor and sensory function returned to baseline

## 2020-07-20 LAB
BACTERIA SPEC AEROBE CULT: NORMAL
GRAM STN SPEC: NORMAL
GRAM STN SPEC: NORMAL
LAB AP CASE REPORT: NORMAL
PATH REPORT.FINAL DX SPEC: NORMAL
PATH REPORT.GROSS SPEC: NORMAL

## 2020-07-21 PROCEDURE — 93010 ELECTROCARDIOGRAM REPORT: CPT | Performed by: INTERNAL MEDICINE

## 2020-07-22 LAB — BACTERIA SPEC ANAEROBE CULT: NORMAL

## 2020-08-14 LAB — FUNGUS WND CULT: NORMAL

## 2020-08-28 LAB
MYCOBACTERIUM SPEC CULT: NORMAL
NIGHT BLUE STAIN TISS: NORMAL

## 2020-10-29 ENCOUNTER — LAB REQUISITION (OUTPATIENT)
Dept: LAB | Facility: HOSPITAL | Age: 60
End: 2020-10-29

## 2020-10-29 DIAGNOSIS — Z00.00 ROUTINE GENERAL MEDICAL EXAMINATION AT A HEALTH CARE FACILITY: ICD-10-CM

## 2020-10-29 LAB
ALBUMIN SERPL-MCNC: 3.5 G/DL (ref 3.5–5.2)
ALBUMIN/GLOB SERPL: 1.9 G/DL
ALP SERPL-CCNC: 52 U/L (ref 39–117)
ALT SERPL W P-5'-P-CCNC: 25 U/L (ref 1–41)
ANION GAP SERPL CALCULATED.3IONS-SCNC: 9.2 MMOL/L (ref 5–15)
AST SERPL-CCNC: 42 U/L (ref 1–40)
BASOPHILS # BLD AUTO: 0.02 10*3/MM3 (ref 0–0.2)
BASOPHILS NFR BLD AUTO: 0.2 % (ref 0–1.5)
BILIRUB SERPL-MCNC: 0.3 MG/DL (ref 0–1.2)
BUN SERPL-MCNC: 19 MG/DL (ref 8–23)
BUN/CREAT SERPL: 17.8 (ref 7–25)
CALCIUM SPEC-SCNC: 9.3 MG/DL (ref 8.6–10.5)
CHLORIDE SERPL-SCNC: 95 MMOL/L (ref 98–107)
CO2 SERPL-SCNC: 27.8 MMOL/L (ref 22–29)
CREAT SERPL-MCNC: 1.07 MG/DL (ref 0.76–1.27)
DEPRECATED RDW RBC AUTO: 41.7 FL (ref 37–54)
EOSINOPHIL # BLD AUTO: 0.04 10*3/MM3 (ref 0–0.4)
EOSINOPHIL NFR BLD AUTO: 0.4 % (ref 0.3–6.2)
ERYTHROCYTE [DISTWIDTH] IN BLOOD BY AUTOMATED COUNT: 13.3 % (ref 12.3–15.4)
GFR SERPL CREATININE-BSD FRML MDRD: 70 ML/MIN/1.73
GLOBULIN UR ELPH-MCNC: 1.8 GM/DL
GLUCOSE SERPL-MCNC: 160 MG/DL (ref 65–99)
HCT VFR BLD AUTO: 28.9 % (ref 37.5–51)
HGB BLD-MCNC: 9.7 G/DL (ref 13–17.7)
IMM GRANULOCYTES # BLD AUTO: 0.04 10*3/MM3 (ref 0–0.05)
IMM GRANULOCYTES NFR BLD AUTO: 0.4 % (ref 0–0.5)
LYMPHOCYTES # BLD AUTO: 0.87 10*3/MM3 (ref 0.7–3.1)
LYMPHOCYTES NFR BLD AUTO: 9.6 % (ref 19.6–45.3)
MCH RBC QN AUTO: 28.9 PG (ref 26.6–33)
MCHC RBC AUTO-ENTMCNC: 33.6 G/DL (ref 31.5–35.7)
MCV RBC AUTO: 86 FL (ref 79–97)
MONOCYTES # BLD AUTO: 1.36 10*3/MM3 (ref 0.1–0.9)
MONOCYTES NFR BLD AUTO: 15 % (ref 5–12)
NEUTROPHILS NFR BLD AUTO: 6.71 10*3/MM3 (ref 1.7–7)
NEUTROPHILS NFR BLD AUTO: 74.4 % (ref 42.7–76)
NRBC BLD AUTO-RTO: 0 /100 WBC (ref 0–0.2)
PLATELET # BLD AUTO: 189 10*3/MM3 (ref 140–450)
PMV BLD AUTO: 11.3 FL (ref 6–12)
POTASSIUM SERPL-SCNC: 3.6 MMOL/L (ref 3.5–5.2)
PROT SERPL-MCNC: 5.3 G/DL (ref 6–8.5)
RBC # BLD AUTO: 3.36 10*6/MM3 (ref 4.14–5.8)
SODIUM SERPL-SCNC: 132 MMOL/L (ref 136–145)
WBC # BLD AUTO: 9.04 10*3/MM3 (ref 3.4–10.8)

## 2020-10-29 PROCEDURE — 85025 COMPLETE CBC W/AUTO DIFF WBC: CPT

## 2020-10-29 PROCEDURE — 80053 COMPREHEN METABOLIC PANEL: CPT

## 2021-02-25 RX ORDER — PREGABALIN 150 MG/1
150 CAPSULE ORAL EVERY 8 HOURS
COMMUNITY
Start: 2021-02-08

## 2021-02-25 RX ORDER — ACETAMINOPHEN 650 MG
1 TABLET, EXTENDED RELEASE ORAL DAILY
Status: ON HOLD | COMMUNITY
End: 2021-03-03

## 2021-02-25 RX ORDER — DOXYCYCLINE HYCLATE 100 MG/1
100 CAPSULE ORAL 2 TIMES DAILY
Status: ON HOLD | COMMUNITY
End: 2022-01-28

## 2021-02-25 RX ORDER — SIMETHICONE 80 MG
80 TABLET,CHEWABLE ORAL EVERY 8 HOURS PRN
COMMUNITY

## 2021-03-01 ENCOUNTER — OFFICE VISIT (OUTPATIENT)
Dept: CARDIOLOGY | Facility: CLINIC | Age: 61
End: 2021-03-01

## 2021-03-01 VITALS
BODY MASS INDEX: 27.7 KG/M2 | TEMPERATURE: 96.6 F | DIASTOLIC BLOOD PRESSURE: 53 MMHG | HEART RATE: 66 BPM | HEIGHT: 69 IN | WEIGHT: 187 LBS | OXYGEN SATURATION: 97 % | SYSTOLIC BLOOD PRESSURE: 82 MMHG

## 2021-03-01 DIAGNOSIS — I10 ESSENTIAL HYPERTENSION: ICD-10-CM

## 2021-03-01 DIAGNOSIS — E11.9 TYPE 2 DIABETES MELLITUS WITHOUT COMPLICATION, WITHOUT LONG-TERM CURRENT USE OF INSULIN (HCC): ICD-10-CM

## 2021-03-01 DIAGNOSIS — Z01.810 PREOP CARDIOVASCULAR EXAM: ICD-10-CM

## 2021-03-01 DIAGNOSIS — I73.9 PERIPHERAL VASCULAR DISEASE (HCC): Primary | ICD-10-CM

## 2021-03-01 DIAGNOSIS — E78.00 PURE HYPERCHOLESTEROLEMIA: ICD-10-CM

## 2021-03-01 DIAGNOSIS — I25.118 CORONARY ARTERY DISEASE OF NATIVE ARTERY OF NATIVE HEART WITH STABLE ANGINA PECTORIS (HCC): ICD-10-CM

## 2021-03-01 PROCEDURE — 99214 OFFICE O/P EST MOD 30 MIN: CPT | Performed by: INTERNAL MEDICINE

## 2021-03-01 PROCEDURE — 93000 ELECTROCARDIOGRAM COMPLETE: CPT | Performed by: INTERNAL MEDICINE

## 2021-03-01 NOTE — PROGRESS NOTES
"    Subjective:     Encounter Date:03/01/2021      Patient ID: Emery Nesbitt is a 61 y.o. male.    Chief Complaint:  History of Present Illness 61-year-old white male with history of coronary artery disease history of peripheral artery disease diabetes hypertension hyperlipidemia presents to my office for follow-up.  Patient is preop evaluation for toe surgery.  Patient does not have any symptoms of chest pain or shortness of breath at rest but does not exert very well.  He uses a wheelchair.  Because of his peripheral artery disease.  No complains of any palpitation dizziness syncope or swelling of the feet.  He is taking his medicine regularly.  He needs preop evaluation for toe surgery under local anesthesia    The following portions of the patient's history were reviewed and updated as appropriate: allergies, current medications, past family history, past medical history, past social history, past surgical history and problem list.  Past Medical History:   Diagnosis Date   • Bipolar disorder (CMS/[facility]) 07/14/2020   • Cellulitis 07/14/2020   • Coronary artery disease    • CVA (cerebral vascular accident) (CMS/[facility])    • Diabetes mellitus (CMS/[facility])    • GERD (gastroesophageal reflux disease)    • Hyperlipidemia    • Hypertension    • Neuropathy    • Open wound     toes   • Peripheral vascular disease (CMS/[facility])    • Schizophrenia (CMS/[facility])      Past Surgical History:   Procedure Laterality Date   • AMPUTATION FOOT Right 7/17/2020    Procedure: RIGHT GREAT TOE AMPUTATION;  Surgeon: Will Garcia DPM;  Location: [facility];  Service: Podiatry;  Laterality: Right;   • CARDIAC CATHETERIZATION     • CORONARY ANGIOPLASTY       Ht 175.3 cm (69\")   BMI 26.11 kg/m²   Family History   Adopted: Yes       Current Outpatient Medications:   •  acetaminophen (TYLENOL) 325 MG tablet, Take 650 mg by mouth Every 6 (Six) Hours As Needed., Disp: , Rfl:   •  amLODIPine (NORVASC) 5 MG tablet, Take 5 mg by mouth Daily. Take dos, " Disp: , Rfl:   •  atorvastatin (LIPITOR) 80 MG tablet, Take 80 mg by mouth Every Night., Disp: , Rfl:   •  cetirizine (zyrTEC) 10 MG tablet, Take 10 mg by mouth Every Night., Disp: , Rfl:   •  Chlorpheniramine-Acetaminophen (CORICIDIN) 2-325 MG tablet, Take  by mouth., Disp: , Rfl:   •  CloNIDine (CATAPRES) 0.2 MG tablet, Take 0.1 mg by mouth 3 (Three) Times a Day As Needed., Disp: , Rfl:   •  clopidogrel (PLAVIX) 75 MG tablet, Take 75 mg by mouth Daily. Facility was instructed to stop 3 days prior to surgery per Dr. Garcia's office, Disp: , Rfl:   •  dicyclomine (BENTYL) 10 MG capsule, Take 10 mg by mouth Every 6 (Six) Hours As Needed., Disp: , Rfl:   •  docusate sodium (COLACE) 100 MG capsule, Take 100 mg by mouth 2 (Two) Times a Day., Disp: , Rfl:   •  doxycycline (VIBRAMYCIN) 100 MG capsule, Take 100 mg by mouth 2 (Two) Times a Day., Disp: , Rfl:   •  DULoxetine (CYMBALTA) 20 MG capsule, Take 20 mg by mouth Daily. Take dos, Disp: , Rfl:   •  fenofibrate (TRICOR) 145 MG tablet, Take 145 mg by mouth Daily., Disp: , Rfl:   •  hydrALAZINE (APRESOLINE) 50 MG tablet, 2 (two) times a day. Take dos, Disp: , Rfl:   •  HYDROcodone-acetaminophen (Norco)  MG per tablet, Take 1 tablet by mouth Every 6 (Six) Hours As Needed for Moderate Pain ., Disp: 4 tablet, Rfl: 0  •  loperamide (IMODIUM) 2 MG capsule, Take 2 mg by mouth 4 (Four) Times a Day As Needed for Diarrhea., Disp: , Rfl:   •  magnesium oxide (MAGNESIUM-OXIDE) 400 (241.3 Mg) MG tablet tablet, MAGNESIUM-OXIDE 400 (241.3 Mg) MG TABS, Disp: , Rfl:   •  metFORMIN (GLUCOPHAGE) 500 MG tablet, Take 500 mg by mouth 2 (Two) Times a Day With Meals. Do not take dos (under local anesthesia), Disp: , Rfl:   •  metoprolol succinate XL (TOPROL-XL) 25 MG 24 hr tablet, Take 25 mg by mouth Daily. Take dos, Disp: , Rfl:   •  Multiple Vitamins-Minerals (MULTIVITAMIN WITH MINERALS) tablet tablet, Take 1 tablet by mouth Daily., Disp: , Rfl:   •  nitroglycerin (NITROLINGUAL) 0.4  MG/SPRAY spray, NITROLINGUAL 0.4 MG/SPRAY SOLN, Disp: , Rfl:   •  OXcarbazepine (TRILEPTAL) 300 MG tablet, Take 150 mg by mouth Every Night., Disp: , Rfl:   •  OXcarbazepine (TRILEPTAL) 300 MG tablet, Take 300 mg by mouth 3 (Three) Times a Day., Disp: , Rfl:   •  pantoprazole (PROTONIX) 40 MG EC tablet, Take 40 mg by mouth Daily. Take dos, Disp: , Rfl:   •  povidone-iodine (BETADINE) 10 % external solution, Apply 1 application topically to the appropriate area as directed Daily. Right 2nd toe and left greate toe, Disp: , Rfl:   •  predniSONE (DELTASONE) 5 MG tablet, Take 5 mg by mouth Daily., Disp: , Rfl:   •  pregabalin (LYRICA) 150 MG capsule, Take 150 mg by mouth Every 8 (Eight) Hours., Disp: , Rfl:   •  pregabalin (LYRICA) 50 MG capsule, 150 mg 3 (Three) Times a Day., Disp: , Rfl:   •  promethazine (PHENERGAN) 12.5 MG tablet, Take 25 mg by mouth Every 6 (Six) Hours As Needed for Nausea or Vomiting., Disp: , Rfl:   •  risperiDONE (risperDAL) 0.5 MG tablet, Take 0.5 mg by mouth 2 (Two) Times a Day., Disp: , Rfl:   •  simethicone (MYLICON) 80 MG chewable tablet, Chew 80 mg Every 8 (Eight) Hours As Needed for Flatulence., Disp: , Rfl:   •  sodium chloride 1 g tablet, Take 1 g by mouth 2 (two) times a day., Disp: , Rfl:   •  traZODone (DESYREL) 50 MG tablet, Take 100 mg by mouth Every Night., Disp: , Rfl:   •  vitamin D3 125 MCG (5000 UT) capsule capsule, Take 5,000 Units by mouth Daily. Stop now for surgery, Disp: , Rfl:   No Known Allergies  Social History     Socioeconomic History   • Marital status: Single     Spouse name: Not on file   • Number of children: Not on file   • Years of education: Not on file   • Highest education level: Not on file   Tobacco Use   • Smoking status: Former Smoker     Quit date:      Years since quittin.1   • Smokeless tobacco: Never Used   Substance and Sexual Activity   • Alcohol use: Never     Frequency: Never   • Drug use: Never     Review of Systems   Constitution:  Negative for fever and malaise/fatigue.   Cardiovascular: Negative for chest pain, dyspnea on exertion and palpitations.   Respiratory: Negative for cough and shortness of breath.    Skin: Negative for rash.   Gastrointestinal: Negative for abdominal pain, nausea and vomiting.   Neurological: Negative for focal weakness and headaches.   All other systems reviewed and are negative.             Objective:     Constitutional:       Appearance: Well-developed.   Eyes:      General: No scleral icterus.     Conjunctiva/sclera: Conjunctivae normal.   HENT:      Head: Normocephalic and atraumatic.   Neck:      Musculoskeletal: Normal range of motion and neck supple.      Vascular: No carotid bruit or JVD.   Pulmonary:      Effort: Pulmonary effort is normal.      Breath sounds: Normal breath sounds. No wheezing. No rales.   Cardiovascular:      Normal rate. Regular rhythm.   Pulses:     Intact distal pulses.   Abdominal:      General: Bowel sounds are normal.      Palpations: Abdomen is soft.   Skin:     General: Skin is warm and dry.      Findings: No rash.   Neurological:      Mental Status: Alert.         ECG 12 Lead    Date/Time: 3/1/2021 2:24 PM  Performed by: Sharath Conklin MD  Authorized by: Sharath Conklin MD   Comments: Sinus rhythm  Normal EKG  No new changes from previous EKG            Lab Review:         MDM  1.  Coronary artery disease  Patient has significant coronary disease in the past and does not have any symptoms of chest pain at this time  2.  Peripheral artery disease  Patient has severe peripheral disease and has ulcers and needs to surgery and is cleared from cardiac status  3.  Hypertension  Patient blood pressures currently stable on medications  4.  Diabetes  Patient is on insulin and is followed by the primary care doctor  5.  Hyperlipidemia  Patient's lipid levels are followed by the primary doctor  6.  Preop evaluation  Patient's risk for toe surgery is low from cardiac status and is cleared.

## 2021-03-02 ENCOUNTER — ANESTHESIA EVENT (OUTPATIENT)
Dept: PERIOP | Facility: HOSPITAL | Age: 61
End: 2021-03-02

## 2021-03-02 NOTE — H&P
FOOT AND ANKLE SURGERY HISTORY AND PHYSICAL      Patient Identification:  Name: Emery Nesbitt  Age: 61 y.o.  Sex: male  : 1960  MRN: [id number]      Chief Complaint: Exposed bone with ulcerations 2nd toe right and 1st toe left.    History of Present Illness:  Emery Nesbitt is a 61 y.o. male who presents today for surgery for amputation of 2nd toe right and partial amputation 1st toe right. Patient has failed  conservative treatments and elects to proceed with surgery at this time.  Patient has been NPO for the past 8 hours. Patient denies any nausea, vomiting, fever, chills.  No other changes from previously performed H&amp;P performed in office, please see physician  notes as needed.      Problem List:  @[facility]@  Past Medical History:  Past Medical History:   Diagnosis Date   • Bipolar disorder (CMS/Formerly [facility]) 2020   • Cellulitis 2020   • Coronary artery disease    • CVA (cerebral vascular accident) (CMS/Formerly [facility])    • Diabetes mellitus (CMS/[facility])    • GERD (gastroesophageal reflux disease)    • Hyperlipidemia    • Hypertension    • Neuropathy    • Open wound     toes   • Peripheral vascular disease (CMS/[facility])    • Schizophrenia (CMS/[facility])      Past Surgical History:  Past Surgical History:   Procedure Laterality Date   • AMPUTATION FOOT Right 2020    Procedure: RIGHT GREAT TOE AMPUTATION;  Surgeon: Will Garcia DPM;  Location: [facility];  Service: Podiatry;  Laterality: Right;   • CARDIAC CATHETERIZATION     • CORONARY ANGIOPLASTY       Home Meds:  No medications prior to admission.     Current Meds:  [unfilled]  Allergies:    No Known Allergies  Immunizations:  [unfilled]  Social History:  Social History     Tobacco Use   • Smoking status: Former Smoker     Quit date:      Years since quittin.1   • Smokeless tobacco: Never Used   Substance Use Topics   • Alcohol use: Never     Frequency: Never     Family History:  Family History   Adopted: Yes         Review of Systems  Review of  Systems - General ROS: negative for - chills, fatigue, malaise, weight gain or weight  loss  Endocrine ROS: negative for - hair pattern changes, hot flashes, malaise/lethargy, temperature  intolerance or unexpected weight changes  Respiratory ROS: negative for - cough, hemoptysis, shortness of breath, tachypnea or wheezing  Cardiovascular ROS: Seen by Dr. Conklin and cleared for surgery from cardiac standpoint.   Musculoskeletal ROS: Exposed bone 2nd toe right in ulceration right 2nd toe. Previous 1st toe amp well healed. Non healing ulceration to capsule pipj left great toe.    Neurological ROS: negative for - behavioral changes, confusion, dizziness, gait disturbance,  seizures or weakness      Objective:    Vitals:  See chart  Exam:  General appearance: alert, appears stated age, cooperative and no distress  Head: Normocephalic, without obvious abnormality, atraumatic  Eyes:No drainage or blurry vision.   Nose: Nares normal. No drainage or sinus tenderness.  Throat:No swelling or soft tissue masses present.   Neck: supple, symmetrical, trachea midline    Lungs: per anesthseia  Heart: Per cardiologist and has clearance. Abdomen:  soft, non-tende    A Lower Extremity Exam was performed  There have been no changes in physical examination since the preoperative examination in  office. Patient's neurovascular status is unchanged since last visit 2/23/21.        Data Review:  Clearance noted by Dr Conklin    Imaging and Diagnostic Studies:  xrays taken in office and reviewed  Assessment:  1. Osteomyelitis 2nd toe right with non healing ulceration  2. Probable ulceration left great toe with non healing ulceration  3. Diabetes with toe ulcerations    Plan:  Reviewed findings and treatment options with patient at bedside.  Discussed procedure(s), along with risks, complications, and recovery with patient at length.  All questions were answered to patient's satisfaction.  Plan for amputation 2nd toe right and partial  amputation lft great toe  Follow up with Dr. Garcia post operatively early next week any problems earlier pt is to call office.           Past Surgical History:   Procedure Laterality Date   • AMPUTATION FOOT Right 7/17/2020    Procedure: RIGHT GREAT TOE AMPUTATION;  Surgeon: Will Garcia DPM;  Location: [facility];  Service: Podiatry;  Laterality: Right;   • CARDIAC CATHETERIZATION     • CORONARY ANGIOPLASTY           Social History:  reports that he quit smoking about 5 years ago. He has never used smokeless tobacco. He reports that he does not drink alcohol or use drugs.    Home Medications:  DULoxetine, HYDROcodone-acetaminophen, OXcarbazepine, amLODIPine, atorvastatin, cetirizine, cloNIDine, clopidogrel, dicyclomine, docusate sodium, doxycycline, fenofibrate, hydrALAZINE, loperamide, magnesium oxide, metFORMIN, metoprolol succinate XL, multivitamin with minerals, nitroglycerin, pantoprazole, povidone-iodine, pregabalin, promethazine, risperiDONE, simethicone, sodium chloride, traZODone, and vitamin D3      Allergies:  No Known Allergies    Objective    Objective     Result Review    Result Review:  I have personally reviewed the results from the time of this admission to 03/02/21 4:04 PM EST and agree with these findings:  []  Laboratory  []  Microbiology  []  Radiology  []  EKG/Telemetry   []  Cardiology/Vascular   []  Pathology  []  Old records  []  Other:      Electronically signed by Will Garcia DPM, 03/02/21, 4:04 PM EST.

## 2021-03-03 ENCOUNTER — APPOINTMENT (OUTPATIENT)
Dept: GENERAL RADIOLOGY | Facility: HOSPITAL | Age: 61
End: 2021-03-03

## 2021-03-03 ENCOUNTER — ANESTHESIA (OUTPATIENT)
Dept: PERIOP | Facility: HOSPITAL | Age: 61
End: 2021-03-03

## 2021-03-03 ENCOUNTER — HOSPITAL ENCOUNTER (OUTPATIENT)
Facility: HOSPITAL | Age: 61
Setting detail: HOSPITAL OUTPATIENT SURGERY
Discharge: NURSING, REHAB OR LONG-TERM CARE | End: 2021-03-03
Attending: PODIATRIST | Admitting: PODIATRIST

## 2021-03-03 VITALS
SYSTOLIC BLOOD PRESSURE: 147 MMHG | HEART RATE: 63 BPM | HEIGHT: 68 IN | OXYGEN SATURATION: 100 % | DIASTOLIC BLOOD PRESSURE: 77 MMHG | TEMPERATURE: 96.2 F | BODY MASS INDEX: 27.58 KG/M2 | RESPIRATION RATE: 15 BRPM | WEIGHT: 182 LBS

## 2021-03-03 DIAGNOSIS — M86.10 ACUTE OSTEOMYELITIS (HCC): ICD-10-CM

## 2021-03-03 LAB
GLUCOSE BLDC GLUCOMTR-MCNC: 77 MG/DL (ref 70–105)
GLUCOSE BLDC GLUCOMTR-MCNC: 82 MG/DL (ref 70–105)

## 2021-03-03 PROCEDURE — 25010000002 PROPOFOL 200 MG/20ML EMULSION: Performed by: ANESTHESIOLOGY

## 2021-03-03 PROCEDURE — 88311 DECALCIFY TISSUE: CPT | Performed by: PODIATRIST

## 2021-03-03 PROCEDURE — 73620 X-RAY EXAM OF FOOT: CPT

## 2021-03-03 PROCEDURE — 87070 CULTURE OTHR SPECIMN AEROBIC: CPT | Performed by: PODIATRIST

## 2021-03-03 PROCEDURE — 25010000002 PROPOFOL 10 MG/ML EMULSION: Performed by: ANESTHESIOLOGY

## 2021-03-03 PROCEDURE — 87206 SMEAR FLUORESCENT/ACID STAI: CPT | Performed by: PODIATRIST

## 2021-03-03 PROCEDURE — 87186 SC STD MICRODIL/AGAR DIL: CPT | Performed by: PODIATRIST

## 2021-03-03 PROCEDURE — 87147 CULTURE TYPE IMMUNOLOGIC: CPT | Performed by: PODIATRIST

## 2021-03-03 PROCEDURE — 87075 CULTR BACTERIA EXCEPT BLOOD: CPT | Performed by: PODIATRIST

## 2021-03-03 PROCEDURE — 87116 MYCOBACTERIA CULTURE: CPT | Performed by: PODIATRIST

## 2021-03-03 PROCEDURE — 87077 CULTURE AEROBIC IDENTIFY: CPT | Performed by: PODIATRIST

## 2021-03-03 PROCEDURE — 88305 TISSUE EXAM BY PATHOLOGIST: CPT | Performed by: PODIATRIST

## 2021-03-03 PROCEDURE — 82962 GLUCOSE BLOOD TEST: CPT

## 2021-03-03 PROCEDURE — 87176 TISSUE HOMOGENIZATION CULTR: CPT | Performed by: PODIATRIST

## 2021-03-03 PROCEDURE — 87076 CULTURE ANAEROBE IDENT EACH: CPT | Performed by: PODIATRIST

## 2021-03-03 PROCEDURE — 87102 FUNGUS ISOLATION CULTURE: CPT | Performed by: PODIATRIST

## 2021-03-03 PROCEDURE — 87205 SMEAR GRAM STAIN: CPT | Performed by: PODIATRIST

## 2021-03-03 DEVICE — SEAL HEMO SURG ARISTA/AH ABS/PWDR 3GM: Type: IMPLANTABLE DEVICE | Site: FOOT | Status: FUNCTIONAL

## 2021-03-03 RX ORDER — FENOFIBRATE 145 MG/1
145 TABLET, COATED ORAL DAILY
Status: DISCONTINUED | OUTPATIENT
Start: 2021-03-03 | End: 2021-03-03 | Stop reason: HOSPADM

## 2021-03-03 RX ORDER — HYDRALAZINE HYDROCHLORIDE 25 MG/1
50 TABLET, FILM COATED ORAL EVERY 12 HOURS SCHEDULED
Status: DISCONTINUED | OUTPATIENT
Start: 2021-03-03 | End: 2021-03-03 | Stop reason: HOSPADM

## 2021-03-03 RX ORDER — SODIUM CHLORIDE 0.9 % (FLUSH) 0.9 %
10 SYRINGE (ML) INJECTION EVERY 12 HOURS SCHEDULED
Status: DISCONTINUED | OUTPATIENT
Start: 2021-03-03 | End: 2021-03-03 | Stop reason: HOSPADM

## 2021-03-03 RX ORDER — METOPROLOL SUCCINATE 25 MG/1
25 TABLET, EXTENDED RELEASE ORAL DAILY
Status: DISCONTINUED | OUTPATIENT
Start: 2021-03-03 | End: 2021-03-03 | Stop reason: HOSPADM

## 2021-03-03 RX ORDER — HYDROCODONE BITARTRATE AND ACETAMINOPHEN 10; 325 MG/1; MG/1
1 TABLET ORAL EVERY 6 HOURS PRN
Status: DISCONTINUED | OUTPATIENT
Start: 2021-03-03 | End: 2021-03-03 | Stop reason: HOSPADM

## 2021-03-03 RX ORDER — CETIRIZINE HYDROCHLORIDE 10 MG/1
10 TABLET ORAL NIGHTLY
Status: DISCONTINUED | OUTPATIENT
Start: 2021-03-03 | End: 2021-03-03 | Stop reason: HOSPADM

## 2021-03-03 RX ORDER — PANTOPRAZOLE SODIUM 40 MG/1
40 TABLET, DELAYED RELEASE ORAL DAILY
Status: DISCONTINUED | OUTPATIENT
Start: 2021-03-03 | End: 2021-03-03 | Stop reason: HOSPADM

## 2021-03-03 RX ORDER — ONDANSETRON 2 MG/ML
4 INJECTION INTRAMUSCULAR; INTRAVENOUS ONCE AS NEEDED
Status: DISCONTINUED | OUTPATIENT
Start: 2021-03-03 | End: 2021-03-03

## 2021-03-03 RX ORDER — PROMETHAZINE HYDROCHLORIDE 25 MG/1
25 TABLET ORAL EVERY 6 HOURS PRN
Status: DISCONTINUED | OUTPATIENT
Start: 2021-03-03 | End: 2021-03-03 | Stop reason: HOSPADM

## 2021-03-03 RX ORDER — AMLODIPINE BESYLATE 5 MG/1
5 TABLET ORAL DAILY
Status: DISCONTINUED | OUTPATIENT
Start: 2021-03-03 | End: 2021-03-03 | Stop reason: HOSPADM

## 2021-03-03 RX ORDER — HYDROCODONE BITARTRATE AND ACETAMINOPHEN 5; 325 MG/1; MG/1
1 TABLET ORAL ONCE AS NEEDED
Status: DISCONTINUED | OUTPATIENT
Start: 2021-03-03 | End: 2021-03-03

## 2021-03-03 RX ORDER — LOPERAMIDE HYDROCHLORIDE 2 MG/1
2 CAPSULE ORAL 4 TIMES DAILY PRN
Status: DISCONTINUED | OUTPATIENT
Start: 2021-03-03 | End: 2021-03-03 | Stop reason: HOSPADM

## 2021-03-03 RX ORDER — ATORVASTATIN CALCIUM 40 MG/1
80 TABLET, FILM COATED ORAL NIGHTLY
Status: DISCONTINUED | OUTPATIENT
Start: 2021-03-03 | End: 2021-03-03 | Stop reason: HOSPADM

## 2021-03-03 RX ORDER — SODIUM CHLORIDE, SODIUM LACTATE, POTASSIUM CHLORIDE, CALCIUM CHLORIDE 600; 310; 30; 20 MG/100ML; MG/100ML; MG/100ML; MG/100ML
9 INJECTION, SOLUTION INTRAVENOUS CONTINUOUS PRN
Status: DISCONTINUED | OUTPATIENT
Start: 2021-03-03 | End: 2021-03-03 | Stop reason: HOSPADM

## 2021-03-03 RX ORDER — DOCUSATE SODIUM 100 MG/1
100 CAPSULE, LIQUID FILLED ORAL 2 TIMES DAILY
Status: DISCONTINUED | OUTPATIENT
Start: 2021-03-03 | End: 2021-03-03 | Stop reason: HOSPADM

## 2021-03-03 RX ORDER — DICYCLOMINE HYDROCHLORIDE 10 MG/1
10 CAPSULE ORAL 4 TIMES DAILY
Status: DISCONTINUED | OUTPATIENT
Start: 2021-03-03 | End: 2021-03-03 | Stop reason: HOSPADM

## 2021-03-03 RX ORDER — OXCARBAZEPINE 150 MG/1
300 TABLET, FILM COATED ORAL 3 TIMES DAILY
Status: DISCONTINUED | OUTPATIENT
Start: 2021-03-03 | End: 2021-03-03 | Stop reason: HOSPADM

## 2021-03-03 RX ORDER — PREGABALIN 75 MG/1
150 CAPSULE ORAL EVERY 8 HOURS
Status: DISCONTINUED | OUTPATIENT
Start: 2021-03-03 | End: 2021-03-03 | Stop reason: HOSPADM

## 2021-03-03 RX ORDER — SODIUM CHLORIDE 1000 MG
1 TABLET, SOLUBLE MISCELLANEOUS 2 TIMES DAILY WITH MEALS
Status: DISCONTINUED | OUTPATIENT
Start: 2021-03-03 | End: 2021-03-03 | Stop reason: HOSPADM

## 2021-03-03 RX ORDER — LIDOCAINE HYDROCHLORIDE 20 MG/ML
INJECTION, SOLUTION EPIDURAL; INFILTRATION; INTRACAUDAL; PERINEURAL AS NEEDED
Status: DISCONTINUED | OUTPATIENT
Start: 2021-03-03 | End: 2021-03-03 | Stop reason: SURG

## 2021-03-03 RX ORDER — PROPOFOL 10 MG/ML
INJECTION, EMULSION INTRAVENOUS AS NEEDED
Status: DISCONTINUED | OUTPATIENT
Start: 2021-03-03 | End: 2021-03-03 | Stop reason: SURG

## 2021-03-03 RX ORDER — CLONIDINE HYDROCHLORIDE 0.1 MG/1
0.1 TABLET ORAL EVERY 8 HOURS SCHEDULED
Status: DISCONTINUED | OUTPATIENT
Start: 2021-03-03 | End: 2021-03-03 | Stop reason: HOSPADM

## 2021-03-03 RX ORDER — MULTIPLE VITAMINS W/ MINERALS TAB 9MG-400MCG
1 TAB ORAL DAILY
Status: DISCONTINUED | OUTPATIENT
Start: 2021-03-03 | End: 2021-03-03 | Stop reason: HOSPADM

## 2021-03-03 RX ORDER — DULOXETIN HYDROCHLORIDE 20 MG/1
20 CAPSULE, DELAYED RELEASE ORAL DAILY
Status: DISCONTINUED | OUTPATIENT
Start: 2021-03-03 | End: 2021-03-03 | Stop reason: HOSPADM

## 2021-03-03 RX ORDER — RISPERIDONE 0.25 MG/1
0.5 TABLET ORAL 2 TIMES DAILY
Status: DISCONTINUED | OUTPATIENT
Start: 2021-03-03 | End: 2021-03-03 | Stop reason: HOSPADM

## 2021-03-03 RX ORDER — TRAZODONE HYDROCHLORIDE 100 MG/1
100 TABLET ORAL NIGHTLY
Status: DISCONTINUED | OUTPATIENT
Start: 2021-03-03 | End: 2021-03-03 | Stop reason: HOSPADM

## 2021-03-03 RX ORDER — SODIUM CHLORIDE 0.9 % (FLUSH) 0.9 %
10 SYRINGE (ML) INJECTION AS NEEDED
Status: DISCONTINUED | OUTPATIENT
Start: 2021-03-03 | End: 2021-03-03 | Stop reason: HOSPADM

## 2021-03-03 RX ORDER — FENTANYL CITRATE 50 UG/ML
50 INJECTION, SOLUTION INTRAMUSCULAR; INTRAVENOUS
Status: DISCONTINUED | OUTPATIENT
Start: 2021-03-03 | End: 2021-03-03

## 2021-03-03 RX ORDER — OXCARBAZEPINE 150 MG/1
150 TABLET, FILM COATED ORAL NIGHTLY
Status: DISCONTINUED | OUTPATIENT
Start: 2021-03-03 | End: 2021-03-03 | Stop reason: HOSPADM

## 2021-03-03 RX ADMIN — PROPOFOL 50 MG: 10 INJECTION, EMULSION INTRAVENOUS at 13:39

## 2021-03-03 RX ADMIN — LIDOCAINE HYDROCHLORIDE 80 MG: 20 INJECTION, SOLUTION EPIDURAL; INFILTRATION; INTRACAUDAL; PERINEURAL at 13:39

## 2021-03-03 RX ADMIN — PROPOFOL 50 MCG/KG/MIN: 10 INJECTION, EMULSION INTRAVENOUS at 13:39

## 2021-03-03 NOTE — ANESTHESIA PREPROCEDURE EVALUATION
Anesthesia Evaluation     Patient summary reviewed and Nursing notes reviewed   NPO Solid Status: > 8 hours  NPO Liquid Status: > 8 hours           Airway   Mallampati: II  Dental    (+) edentulous    Pulmonary    Cardiovascular     (+) hypertension, CAD, PVD, hyperlipidemia,       Neuro/Psych  (+) CVA, numbness, psychiatric history,     GI/Hepatic/Renal/Endo    (+)  GERD,  diabetes mellitus well controlled,     ROS Comment: DM x about 20 yrs    Musculoskeletal     Abdominal    Substance History      OB/GYN          Other                        Anesthesia Plan    ASA 3     MAC       Anesthetic plan, all risks, benefits, and alternatives have been provided, discussed and informed consent has been obtained with: patient.

## 2021-03-03 NOTE — ANESTHESIA POSTPROCEDURE EVALUATION
Patient: Emery Nesbitt    Procedure Summary     Date: 03/03/21 Room / Location: [facility] OR  / [facility] OR    Anesthesia Start: 1337 Anesthesia Stop: 1454    Procedure: AMPUTATION 2ND TOE RIGHT SIDE/ PARTIAL AMPUTATION 1ST TOE ON LEFT (Bilateral Foot) Diagnosis:       Acute osteomyelitis (CMS/HCC)      (Acute osteomyelitis (CMS/HCC) [M86.10])    Surgeon: Will Garcia DPM Provider: Pat Toro MD    Anesthesia Type: MAC ASA Status: 3          Anesthesia Type: MAC    Vitals  Vitals Value Taken Time   /66 03/03/21 1528   Temp     Pulse 66 03/03/21 1532   Resp 14 03/03/21 1524   SpO2 97 % 03/03/21 1532   Vitals shown include unvalidated device data.        Post Anesthesia Care and Evaluation    Patient location during evaluation: PACU  Patient participation: complete - patient participated  Level of consciousness: awake  Pain scale: See nurse's notes for pain score.  Pain management: adequate  Airway patency: patent  Anesthetic complications: No anesthetic complications  PONV Status: none  Cardiovascular status: acceptable  Respiratory status: acceptable  Hydration status: acceptable    Comments: Patient seen and examined postoperatively; vital signs stable; SpO2 greater than or equal to 90%; cardiopulmonary status stable; nausea/vomiting adequately controlled; pain adequately controlled; no apparent anesthesia complications; patient discharged from anesthesia care when discharge criteria were met

## 2021-03-03 NOTE — OP NOTE
AMPUTATION DIGIT  Procedure Report    Patient Name:  Emery Nesbitt  YOB: 1960    Date of Surgery:  3/3/2021     Indications: Osteomyelitis second toe right and osteomyelitis first toe left    Pre-op Diagnosis:   Acute osteomyelitis (CMS/[facility]) [M86.10]       Post-Op Diagnosis Codes:     * Acute osteomyelitis (CMS/HCC) [M86.10]    Procedure/CPT® Codes:      Procedure(s):  AMPUTATION 2ND TOE RIGHT SIDE/ PARTIAL AMPUTATION 1ST TOE ON LEFT    Staff:  Surgeon(s):  Will Garcia DPM         Anesthesiologist: No responsible provider has been recorded for the case.    Anesthesia: Local    Estimated Blood Loss: Less than 10 cc    Implants:    Implant Name Type Inv. Item Serial No.  Lot No. LRB No. Used Action   SEAL HEMO ABS SYLVIE AH PWDR 3GM - [id number] Implant SEAL HEMO ABS SYLVIE AH PWDR 3GM  [employer] [id number] Left 1 Implanted           Findings: Significant edema second toe right and nonhealing wound ipj 1st toe left    Complications: None    Description of Procedure: Patient is a pleasant 61-year-old male that has a longstanding history of ulcerations to his second toe right and first toe left.  Patient lives in a nursing home and has had numerous months of wound care that has failed to rectify his condition.  Patient developed an infection in the second toe on the right and there was exposed bone at the proximal interphalangeal joint.  There is also noted to be a nonhealing wound at the IPJ joint on the first toe left and the toe is noted to be dislocated.  Patient does not ambulate but he does stand to use the restroom and to transfer.  Patient's wound at ipj day does track down to capsule.  There is localized edema and erythema however the second toe on the right is edematous and erythematous to the base of the second toe.  Cultures were taken which did show he had grown out numerous bacteria including MRSA Klebsiella and active acetbactrium all were  sensitive to doxycycline which she is currently taking 100 mg twice a day.  At this point after months of failing to heal these wounds and getting worse the best treatment for him while still available is amputation of the second toe right partial amputation first toe left.  He has had previous first toe amputation on the right and had noneventful healing.  Patient is tired of doing the dressing changes and does not want to count on the nurses anymore because they miss his dressing changes at times he states.  He is willing to undergo surgery for these problems.  Patient was given the risk versus benefits which include bleeding infection nerve damage loss of limb loss of life continued infection worsening problem.  Patient still wants to proceed with surgery a consent was signed in the office placed in the chart.  Patient will obtain medical clearance at his nursing facility and can obtain clearance from his cardiologist prior to surgery we would like for him to stop Plavix 3 days prior.  All questions answered no guarantees given or implied about the outcome  Patient brought back to the operating room placed on the operating table supine position where pneumatic ankle tourniquets were placed about the patient's both ankles.  Once monitored anesthesia was achieved the second toe and first toe were anesthetized with an equal mixture of 5 cc of 1% lidocaine plain and half percent Marcaine plain.  The feet were then scrubbed prepped and draped in usual aseptic manner.   Procedure #1 amputation second toe right foot.   Patient was then directed to the second toe on the right foot where a fishmouth incision with medial and lateral flaps was made at the base of the second toe.  The incision was made full-thickness and the flaps were peeled medial and laterally until the second MPJ was identified.  At this time the toe was disarticulated at the second MPJ and passed from the operating site in toto.  All infected and  unhealthy tissue was removed.  The toe was sent Microbiology for Gram stain aerobic anaerobic acid-fast and fungal.  Wound was then flushed with copious amounts of normal sterile saline approximately 1 L and at this point there was noted to be good bleeding but oozing secondary to the Plavix and Alesia was packed into the wound.  Once the bleeding was controlled closure consisted of 4-0 Vicryl and 4-0 nylon with a combination of horizontal and simple suture technique.    Procedure #2 partial amputation first toe left foot  The exact same procedure that was performed in procedure #1 was performed in procedure #2 with the exception that the base of the proximal phalanx of the great toe was left intact to maintain the flexor tendon and closure consisted of 3-0 Vicryl and 3-0 nylon.  Alesia was also utilized into the wound to help control bleeding.  Good bleeding bone was noted in the proximal phalanx and at this point the decision was made to leave the base of the proximal phalanx intact.     After closure on both wounds both areas were anesthetized with 5 cc of half percent Marcaine plain.  No tourniquets were used during the procedure.  Dressings consisted of Betadine Adaptic 4 x 4's cast padding ABD pad kerlix and Ace bandage.  Written and oral postoperative instructions were given to the patient prior to discharge.  Patient has a follow-up appointment early next week but he is to call if there is any problems before such as nausea vomiting fevers chills diarrhea.  Bleeding during the procedure was noted to be good and appears to be adequate for healing           Will Garcia DPM     Date: 3/3/2021  Time: 15:10 EST

## 2021-03-05 LAB
LAB AP CASE REPORT: NORMAL
PATH REPORT.FINAL DX SPEC: NORMAL
PATH REPORT.GROSS SPEC: NORMAL

## 2021-03-06 LAB
BACTERIA SPEC AEROBE CULT: ABNORMAL
BACTERIA SPEC AEROBE CULT: ABNORMAL
GRAM STN SPEC: ABNORMAL
GRAM STN SPEC: ABNORMAL

## 2021-03-07 LAB
BACTERIA SPEC AEROBE CULT: ABNORMAL
GRAM STN SPEC: ABNORMAL
GRAM STN SPEC: ABNORMAL

## 2021-03-08 LAB — BACTERIA SPEC ANAEROBE CULT: NORMAL

## 2021-03-11 LAB — BACTERIA SPEC ANAEROBE CULT: ABNORMAL

## 2021-03-31 LAB
FUNGUS WND CULT: NORMAL
FUNGUS WND CULT: NORMAL

## 2021-04-14 LAB
MYCOBACTERIUM SPEC CULT: NORMAL
MYCOBACTERIUM SPEC CULT: NORMAL
NIGHT BLUE STAIN TISS: NORMAL
NIGHT BLUE STAIN TISS: NORMAL

## 2021-06-07 NOTE — CONSULTS
Last Med Check: 3/13/20    Next med check due on: Septemnber 2020.    CSA on File: n/a    Future Appointment Scheduled ? no    Last Med Refill? 12/2/2019 #90    Candace Sharp, CMA         Name: Emery Nesbitt ADMIT: 2020   : 1960  PCP: Wang Johnson MD    MRN: [id number] LOS: 0 days   AGE/SEX: 60 y.o. male  ROOM: [address] [place] [address]         CHIEF COMPLAINT: Right toe cellulitis and draining wound  HPI: Emery Nesbitt is a 60 y.o. male who usually resides in a nursing home secondary to multiple strokes whose previous medical records I have reviewed and summarize below in addition to the findings from today's exam.  He has a history of a bilateral femoral-popliteal bypass performed by Dr. Woods years ago at [facility].  Does not know of any follow-up since that time.  He came into clinic today complaining of a significant right great toe wound that easily probes to bone and was sent to the hospital for direct admission, antibiotics, work-up.  Denies pain, fever, chills, nausea, vomiting.    PAST MEDICAL HISTORY:   Past Medical History:   Diagnosis Date   • Bipolar disorder (CMS/[facility]) 2020   • Cellulitis 2020   • Coronary artery disease    • CVA (cerebral vascular accident) (CMS/[facility])    • Diabetes mellitus (CMS/[facility])    • Hyperlipidemia    • Hypertension    • Neuropathy    • Peripheral vascular disease (CMS/[facility])    • Schizophrenia (CMS/[facility])       PAST SURGICAL HISTORY:   Past Surgical History:   Procedure Laterality Date   • CARDIAC CATHETERIZATION     • CORONARY ANGIOPLASTY        FAMILY HISTORY:   Family History   Adopted: Yes      SOCIAL HISTORY:   Social History     Tobacco Use   • Smoking status: Former Smoker     Last attempt to quit: 2016     Years since quittin.5   • Smokeless tobacco: Never Used   Substance Use Topics   • Alcohol use: Never     Frequency: Never   • Drug use: Never      MEDICATIONS:   No current facility-administered medications on file prior to encounter.      Current Outpatient Medications on File Prior to Encounter   Medication Sig Dispense Refill   • acetaminophen (TYLENOL) 325 MG tablet Take 650 mg by mouth Every 6 (Six) Hours As Needed.      • amLODIPine (NORVASC) 5 MG tablet Take 5 mg by mouth Daily.     • atorvastatin (LIPITOR) 80 MG tablet Take 80 mg by mouth Every Night.     • cetirizine (zyrTEC) 10 MG tablet Take 10 mg by mouth Every Night.     • CloNIDine (CATAPRES) 0.2 MG tablet Take 0.1 mg by mouth 3 (Three) Times a Day As Needed.     • clopidogrel (PLAVIX) 75 MG tablet Take 75 mg by mouth Daily.     • dicyclomine (BENTYL) 10 MG capsule Take 10 mg by mouth Every 6 (Six) Hours As Needed.     • docusate sodium (COLACE) 100 MG capsule Take 100 mg by mouth 2 (Two) Times a Day.     • DULoxetine (CYMBALTA) 20 MG capsule Take 20 mg by mouth Daily.     • fenofibrate (TRICOR) 145 MG tablet Take 145 mg by mouth Daily.     • hydrALAZINE (APRESOLINE) 50 MG tablet 3 (Three) Times a Day.     • HYDROcodone-acetaminophen (NORCO)  MG per tablet Take 1 tablet by mouth Every 6 (Six) Hours As Needed.     • loperamide (IMODIUM) 2 MG capsule Take 2 mg by mouth 4 (Four) Times a Day As Needed for Diarrhea.     • metFORMIN (GLUCOPHAGE) 500 MG tablet Take 500 mg by mouth 2 (Two) Times a Day With Meals.     • metoprolol succinate XL (TOPROL-XL) 25 MG 24 hr tablet Take 25 mg by mouth Daily.     • Multiple Vitamins-Minerals (MULTIVITAMIN WITH MINERALS) tablet tablet Take 1 tablet by mouth Daily.     • nitroglycerin (NITROLINGUAL) 0.4 MG/SPRAY spray NITROLINGUAL 0.4 MG/SPRAY SOLN     • OXcarbazepine (TRILEPTAL) 300 MG tablet Take 150 mg by mouth Every Night.     • OXcarbazepine (TRILEPTAL) 300 MG tablet Take 300 mg by mouth 3 (Three) Times a Day.     • pantoprazole (PROTONIX) 40 MG EC tablet Take 40 mg by mouth Daily.     • predniSONE (DELTASONE) 5 MG tablet Take 5 mg by mouth Daily.     • pregabalin (LYRICA) 50 MG capsule 150 mg 3 (Three) Times a Day.     • promethazine (PHENERGAN) 12.5 MG tablet Take 12.5 mg by mouth Every 6 (Six) Hours As Needed for Nausea or Vomiting.     • risperiDONE (risperDAL) 0.5 MG tablet Take 0.5 mg by mouth 2 (Two) Times a Day.     •  sodium chloride 1 g tablet Take 1 g by mouth 2 (two) times a day.     • traZODone (DESYREL) 50 MG tablet Take 100 mg by mouth Every Night.     • Chlorpheniramine-Acetaminophen (CORICIDIN) 2-325 MG tablet Take  by mouth.     • magnesium oxide (MAGNESIUM-OXIDE) 400 (241.3 Mg) MG tablet tablet MAGNESIUM-OXIDE 400 (241.3 Mg) MG TABS     • [DISCONTINUED] aspirin (ASPIR-LOW) 81 MG EC tablet ASPIR-LOW 81 MG TBEC     • [DISCONTINUED] cyclobenzaprine (FLEXERIL) 10 MG tablet CYCLOBENZAPRINE HCL 10 MG TABS     • [DISCONTINUED] escitalopram (LEXAPRO) 10 MG tablet ESCITALOPRAM OXALATE 10 MG TABS     • [DISCONTINUED] ferrous sulfate (FE TABS) 325 (65 FE) MG EC tablet FE TABS 325 (65 Fe) MG TBEC     • [DISCONTINUED] gabapentin (NEURONTIN) 600 MG tablet GABAPENTIN 600 MG TABS     • [DISCONTINUED] guaifenesin-dextromethorphan (ROBITUSSIN DM) 100-10 MG/5ML syrup GUAIFENESIN--10 MG/5ML SYRP     • [DISCONTINUED] hydrOXYzine (ATARAX) 25 MG tablet HYDROXYZINE HCL 25 MG TABS     • [DISCONTINUED] levocetirizine (XYZAL) 5 MG tablet XYZAL 5 MG ORAL TABLET     • [DISCONTINUED] lisinopril (PRINIVIL,ZESTRIL) 10 MG tablet LISINOPRIL 10 MG TABS     • [DISCONTINUED] loratadine (CLARITIN) 10 MG tablet CLARITIN 10 MG TABS     • [DISCONTINUED] pentoxifylline (TRENtal) 400 MG CR tablet PENTOXIFYLLINE  MG CR-TABS     • [DISCONTINUED] Polyethylene Glycol 3350 (MIRALAX PO) MIRALAX POWD     • [DISCONTINUED] rOPINIRole (REQUIP) 0.5 MG tablet REQUIP 0.5 MG TABS     • [DISCONTINUED] zolpidem (AMBIEN) 10 MG tablet AMBIEN 10 MG TABS               ALLERGIES: Patient has no known allergies.     COMPLETE REVIEW OF SYSTEMS:     Review of Systems   Constitutional: Positive for activity change and fatigue. Negative for fever.   Respiratory: Negative for cough and shortness of breath.    Cardiovascular: Negative for chest pain and leg swelling.   Gastrointestinal: Negative for abdominal pain, nausea and vomiting.   Musculoskeletal: Negative for back pain  "and gait problem.   Skin: Positive for wound. Negative for color change.   Neurological: Negative for speech difficulty and headaches.         PHYSICAL EXAM:   Patient Vitals for the past 24 hrs:   BP Temp Temp src Pulse Resp SpO2 Height Weight   07/14/20 1104 162/84 97.7 °F (36.5 °C) Oral 66 17 99 % 175.3 cm (69\") 82 kg (180 lb 12.4 oz)        Physical Exam   Constitutional: He is oriented to person, place, and time.   I have reviewed the vital signs listed in this exam.    Chronically ill-appearing   HENT:   Mouth/Throat: Oropharynx is clear and moist.   No jugular venous distension   Eyes: Conjunctivae are normal.   Cardiovascular: Normal rate, regular rhythm and normal heart sounds.   Pulses:       Carotid pulses are 2+ on the right side, and 2+ on the left side.       Radial pulses are 2+ on the right side, and 2+ on the left side.        Femoral pulses are 2+ on the right side, and 2+ on the left side.       Popliteal pulses are 2+ on the right side, and 2+ on the left side.   PMI normal  No Abdominal aortic aneurysm is palpable.     Pedal pulses are dopplerable.  Right great toe wound on the dorsal side measures approximately 1 x 2 cm with a easy palpation into the joint.   Pulmonary/Chest: Effort normal and breath sounds normal. No respiratory distress.   Abdominal: Soft. He exhibits no distension. There is no tenderness. There is no guarding.   No hepatosplenomegaly is palpable.      Musculoskeletal: He exhibits no deformity.   Normal muscular tone of the four extremities without flaccidity, atrophy, or abnormal movements.     Inspection of the digits and nails is negative for clubbing, cyanosis, infection, or other pathology.    Neurological: He is alert and oriented to person, place, and time.   Skin: Skin is warm and dry. No rash noted.   Psychiatric: He has a normal mood and affect.             LABS:      Recent Results (from the past 24 hour(s))   Comprehensive Metabolic Panel    Collection Time: " 07/14/20 12:16 PM   Result Value Ref Range    Glucose 80 65 - 99 mg/dL    BUN      Creatinine 0.73 (L) 0.76 - 1.27 mg/dL    Sodium 131 (L) 136 - 145 mmol/L    Potassium 4.0 3.5 - 5.2 mmol/L    Chloride 95 (L) 98 - 107 mmol/L    CO2 26.0 22.0 - 29.0 mmol/L    Calcium 9.2 8.6 - 10.5 mg/dL    Total Protein 5.9 (L) 6.0 - 8.5 g/dL    Albumin 4.00 3.50 - 5.20 g/dL    ALT (SGPT) 21 1 - 41 U/L    AST (SGOT) 19 1 - 40 U/L    Alkaline Phosphatase 43 39 - 117 U/L    Total Bilirubin 0.2 0.0 - 1.2 mg/dL    eGFR Non African Amer 109 >60 mL/min/1.73    Globulin 1.9 gm/dL    A/G Ratio 2.1 g/dL    BUN/Creatinine Ratio      Anion Gap 10.0 5.0 - 15.0 mmol/L   Lactic Acid, Plasma    Collection Time: 07/14/20 12:16 PM   Result Value Ref Range    Lactate 0.7 0.5 - 2.0 mmol/L   Sedimentation Rate    Collection Time: 07/14/20 12:16 PM   Result Value Ref Range    Sed Rate 10 0 - 20 mm/hr   C-reactive Protein    Collection Time: 07/14/20 12:16 PM   Result Value Ref Range    C-Reactive Protein 0.69 (H) 0.00 - 0.50 mg/dL   CBC Auto Differential    Collection Time: 07/14/20 12:16 PM   Result Value Ref Range    WBC 7.50 3.40 - 10.80 10*3/mm3    RBC 3.72 (L) 4.14 - 5.80 10*6/mm3    Hemoglobin 11.0 (L) 13.0 - 17.7 g/dL    Hematocrit 31.8 (L) 37.5 - 51.0 %    MCV 85.3 79.0 - 97.0 fL    MCH 29.6 26.6 - 33.0 pg    MCHC 34.7 31.5 - 35.7 g/dL    RDW 16.7 (H) 12.3 - 15.4 %    RDW-SD 50.3 37.0 - 54.0 fl    MPV 9.2 6.0 - 12.0 fL    Platelets 157 140 - 450 10*3/mm3    Neutrophil % 65.7 42.7 - 76.0 %    Lymphocyte % 22.0 19.6 - 45.3 %    Monocyte % 10.6 5.0 - 12.0 %    Eosinophil % 1.2 0.3 - 6.2 %    Basophil % 0.5 0.0 - 1.5 %    Neutrophils, Absolute 4.90 1.70 - 7.00 10*3/mm3    Lymphocytes, Absolute 1.60 0.70 - 3.10 10*3/mm3    Monocytes, Absolute 0.80 0.10 - 0.90 10*3/mm3    Eosinophils, Absolute 0.10 0.00 - 0.40 10*3/mm3    Basophils, Absolute 0.00 0.00 - 0.20 10*3/mm3    nRBC 0.0 0.0 - 0.2 /100 WBC   ]    The following radiologic or non-invasive  studies including the images have been independently reviewed by me and my impressions are as follows: None available       ASSESSMENT/PLAN: 60 y.o. male with peripheral vascular disease with a history of bilateral bypasses.  Now with right toe nonhealing wound with clinical osteomyelitis.  Admitted for ischemic, infectious work-up.  Will need podiatry to see him as well.      The patient was wearing a facemask when I entered the room and throughout our encounter. I wore protective equipment throughout this patient encounter including a facemask and gloves. Hand hygiene was performed before donning protective equipment and after removal when leaving the room.        Problem Points:  4:  Patient has a new problem, with additional work-up planned  Total problem points:4 or more    Data Points:  1:  I have reviewed or order clinical lab test  2:  I have reviewed and summation of old records and/or discussed the patients care with another health care provider  Total data points:3    Risk Points:  High:  Chronic illness with severe exacerbation or progression    MDM requires 2/3 (Problem points, Data points and Risk)  MDM Prob point Data point Risk   SF 1 1 Minimal   Low 2 2 Low   Mod 3 3 Moderate   High 4 4 High     Code requires 3/3 (MDM, History and Exam)  Code MDM History Exam Time   81813 SF/Low Detailed Detailed 30   73131 Mod Comprehensive Comprehensive 50   75549 High Comprehensive Comprehensive 70     Detailed history:  4 elements HPI or status of 3 chronic problems; 2-9 system ROS  Comprehensive:  4 elements HPI or status of 3 chronic problems;  10 system ROS    Detailed Exam:  12 findings from any organ system  Comprehensive Exam:  2 findings from each of 9 systems.     Billin    Assessment/Plan       Diabetic foot ulcer (CMS/HCC)    Anemia    Peripheral vascular disease (CMS/[facility])    Hypertension    Hyperlipidemia    Diabetes mellitus (CMS/HCC)    Coronary artery disease    Bipolar affective disorder  (CMS/[facility])    Cellulitis of right toe    Hyponatremia    Hypomagnesemia    Seasonal allergies    Anxiety disorder    RLS (restless legs syndrome)    Peripheral neuropathy      Roel Olson MD   07/14/20

## 2021-08-06 NOTE — PAT
Faxed info request to andrea at [facility]  Jermaine from Dr Garcia's office called [place] will not be able to send covid test back , Jermaine advised [facility] to make arrangements for patient to come in tomorrow afer 9 am for testing

## 2021-08-09 RX ORDER — DIPHENHYDRAMINE HCL 25 MG
25 CAPSULE ORAL EVERY 8 HOURS PRN
COMMUNITY

## 2021-08-09 NOTE — PAT
Left message for Jermaine at [facility]'s office re: Plavix  Spoke to Jermaine calling [place] about when to stop plavix  Faxed med instructions to andrea at [facility]

## 2021-08-10 ENCOUNTER — LAB (OUTPATIENT)
Dept: LAB | Facility: HOSPITAL | Age: 61
End: 2021-08-10

## 2021-08-10 ENCOUNTER — HOSPITAL ENCOUNTER (OUTPATIENT)
Dept: CARDIOLOGY | Facility: HOSPITAL | Age: 61
Discharge: HOME | End: 2021-08-10

## 2021-08-10 LAB — SARS-COV-2 ORF1AB RESP QL NAA+PROBE: NOT DETECTED

## 2021-08-10 PROCEDURE — 93005 ELECTROCARDIOGRAM TRACING: CPT | Performed by: PODIATRIST

## 2021-08-10 PROCEDURE — 93010 ELECTROCARDIOGRAM REPORT: CPT | Performed by: INTERNAL MEDICINE

## 2021-08-10 PROCEDURE — U0004 COV-19 TEST NON-CDC HGH THRU: HCPCS

## 2021-08-10 PROCEDURE — C9803 HOPD COVID-19 SPEC COLLECT: HCPCS

## 2021-08-10 PROCEDURE — U0005 INFEC AGEN DETEC AMPLI PROBE: HCPCS

## 2021-08-12 RX ORDER — CLINDAMYCIN PHOSPHATE 900 MG/50ML
900 INJECTION, SOLUTION INTRAVENOUS ONCE
Status: DISCONTINUED | OUTPATIENT
Start: 2021-08-12 | End: 2021-08-13 | Stop reason: HOSPADM

## 2021-08-12 NOTE — H&P
FOOT AND ANKLE SURGERY HISTORY AND PHYSICAL      Patient Identification:  Name: Emery Nesbitt  Age: 61 y.o.  Sex: male  : 1960  MRN: [id number]      Chief Complaint: Chronic ulceration with osteomyelitis 3rd toe right  History of Present Illness:  Emery Nesbitt is a 61 y.o. male who presents today for surgery. Patient has failed  conservative treatments and elects to proceed with surgery at this time.  Patient has been NPO for the past 8 hours. Patient denies any nausea, vomiting, fever, chills.  No other changes from previously performed H&amp;P performed in office, please see physician  notes as needed.      Problem List:  @[facility]@  Past Medical History:  Past Medical History:   Diagnosis Date   • Anxiety    • Bipolar affective (CMS/[facility])    • Bipolar disorder (CMS/[facility]) 2020   • Cellulitis 2020   • Compression fracture of lumbar vertebra (CMS/[facility])    • Constipation    • Coronary artery disease    • CVA (cerebral vascular accident) (CMS/[facility])    • Diabetes mellitus (CMS/[facility])    • Diarrhea    • GERD (gastroesophageal reflux disease)    • Hyperlipidemia    • Hypertension    • Insomnia    • Low back pain    • Major depression    • Nausea    • Neuropathy    • Open wound     toes   • Orthostatic hypotension    • Peripheral vascular disease (CMS/[facility])    • Schizophrenia (CMS/[facility])    • Weakness      Past Surgical History:  Past Surgical History:   Procedure Laterality Date   • AMPUTATION DIGIT Bilateral 3/3/2021    Procedure: AMPUTATION 2ND TOE RIGHT SIDE/ PARTIAL AMPUTATION 1ST TOE ON LEFT;  Surgeon: Will Garcia DPM;  [facility]: [facility];  Service: Podiatry;  Laterality: Bilateral;   • AMPUTATION FOOT Right 2020    Procedure: RIGHT GREAT TOE AMPUTATION;  Surgeon: Will Garcia DPM;  Location: [facility] OR;  Service: Podiatry;  Laterality: Right;   • CARDIAC CATHETERIZATION     • CORONARY ANGIOPLASTY       Home Meds:  No medications prior to admission.     Current  Meds:  [unfilled]  Allergies:    No Known Allergies  Immunizations:  [unfilled]  Social History:  Social History     Tobacco Use   • Smoking status: Former Smoker     Quit date: 2016     Years since quittin.6   • Smokeless tobacco: Never Used   Substance Use Topics   • Alcohol use: Never     Family History:  Family History   Adopted: Yes         Review of Systems  Review of Systems - General ROS: negative for - chills, fatigue, malaise, weight gain or weight  loss  Endocrine ROS: negative for - hair pattern changes, hot flashes, malaise/lethargy, temperature  intolerance or unexpected weight changes  Respiratory ROS: negative for - cough, hemoptysis, shortness of breath, tachypnea or wheezing  Cardiovascular ROS: Per cardiologist  Musculoskeletal ROS: Chronic wound pipj 3rd toe right with bone exposure  Neurological ROS: negative for - behavioral changes, confusion, dizziness, gait disturbance,  seizures or weakness      Objective:    Vitals:  There were no vitals filed for this visit.    Exam:  General appearance: Appears his age  Head: Normocephalic, without obvious abnormality, atraumatic  Eyes: conjunctivae/corneas clear. PERRL.  Nose: Nares normal. Septum midline. No drainage or sinus tenderness.  Throat: lips, mucosa, and tongue normal; teeth and gums normal  Neck: supple, symmetrical, trachea midline    Back: symmetric, no curvature. ROM normal. No CVA tenderness.  Lungs: clear to auscultation bilaterally  Heart: Under care cardiolgist  Abdomen:  soft, non-tender, no masses      A Lower Extremity Exam was performed  There have been no changes in physical examination since the preoperative examination in  office. Patient's neurovascular status is unchanged since last visit.        Data Review:  Biopsy negative for malignancy labs reviewed and coagulations studies reviewed.        Assessment:  1.Osteomyelitis 3rd toe right  2. Diabetic foot infection  3.Pvd     Plan:  Reviewed findings and treatment options with  patient at bedside.  Discussed procedure(s), along with risks, complications, and recovery with patient at length.  All questions were answered to patient's satisfaction.  Plan for amputation 3rd toe right  Follow up with Dr. Garcia post operatively

## 2021-08-13 ENCOUNTER — HOSPITAL ENCOUNTER (OUTPATIENT)
Facility: HOSPITAL | Age: 61
Setting detail: HOSPITAL OUTPATIENT SURGERY
Discharge: NURSING, REHAB OR LONG-TERM CARE | End: 2021-08-13
Attending: PODIATRIST | Admitting: PODIATRIST

## 2021-08-13 ENCOUNTER — APPOINTMENT (OUTPATIENT)
Dept: GENERAL RADIOLOGY | Facility: HOSPITAL | Age: 61
End: 2021-08-13

## 2021-08-13 ENCOUNTER — ANESTHESIA EVENT (OUTPATIENT)
Dept: PERIOP | Facility: HOSPITAL | Age: 61
End: 2021-08-13

## 2021-08-13 ENCOUNTER — ANESTHESIA (OUTPATIENT)
Dept: PERIOP | Facility: HOSPITAL | Age: 61
End: 2021-08-13

## 2021-08-13 VITALS
OXYGEN SATURATION: 97 % | RESPIRATION RATE: 12 BRPM | BODY MASS INDEX: 26.36 KG/M2 | DIASTOLIC BLOOD PRESSURE: 72 MMHG | TEMPERATURE: 97 F | SYSTOLIC BLOOD PRESSURE: 165 MMHG | HEIGHT: 69 IN | WEIGHT: 178 LBS | HEART RATE: 65 BPM

## 2021-08-13 DIAGNOSIS — L97.514 ULCER OF TOE OF RIGHT FOOT, WITH NECROSIS OF BONE (HCC): ICD-10-CM

## 2021-08-13 LAB
GLUCOSE BLDC GLUCOMTR-MCNC: 100 MG/DL (ref 70–105)
GLUCOSE BLDC GLUCOMTR-MCNC: 96 MG/DL (ref 70–105)

## 2021-08-13 PROCEDURE — 87205 SMEAR GRAM STAIN: CPT | Performed by: PODIATRIST

## 2021-08-13 PROCEDURE — 87075 CULTR BACTERIA EXCEPT BLOOD: CPT | Performed by: PODIATRIST

## 2021-08-13 PROCEDURE — 88305 TISSUE EXAM BY PATHOLOGIST: CPT | Performed by: PODIATRIST

## 2021-08-13 PROCEDURE — 88311 DECALCIFY TISSUE: CPT | Performed by: PODIATRIST

## 2021-08-13 PROCEDURE — 87070 CULTURE OTHR SPECIMN AEROBIC: CPT | Performed by: PODIATRIST

## 2021-08-13 PROCEDURE — 25010000002 FENTANYL CITRATE (PF) 100 MCG/2ML SOLUTION: Performed by: ANESTHESIOLOGY

## 2021-08-13 PROCEDURE — 87147 CULTURE TYPE IMMUNOLOGIC: CPT | Performed by: PODIATRIST

## 2021-08-13 PROCEDURE — 25010000002 PROPOFOL 10 MG/ML EMULSION: Performed by: ANESTHESIOLOGY

## 2021-08-13 PROCEDURE — 25010000002 MIDAZOLAM PER 1 MG: Performed by: ANESTHESIOLOGY

## 2021-08-13 PROCEDURE — 87186 SC STD MICRODIL/AGAR DIL: CPT | Performed by: PODIATRIST

## 2021-08-13 PROCEDURE — 73620 X-RAY EXAM OF FOOT: CPT

## 2021-08-13 PROCEDURE — 82962 GLUCOSE BLOOD TEST: CPT

## 2021-08-13 RX ORDER — EPHEDRINE SULFATE 50 MG/ML
INJECTION INTRAVENOUS AS NEEDED
Status: DISCONTINUED | OUTPATIENT
Start: 2021-08-13 | End: 2021-08-13 | Stop reason: SURG

## 2021-08-13 RX ORDER — CLINDAMYCIN PHOSPHATE 900 MG/50ML
INJECTION, SOLUTION INTRAVENOUS AS NEEDED
Status: DISCONTINUED | OUTPATIENT
Start: 2021-08-13 | End: 2021-08-13 | Stop reason: SURG

## 2021-08-13 RX ORDER — SODIUM CHLORIDE 0.9 % (FLUSH) 0.9 %
10 SYRINGE (ML) INJECTION AS NEEDED
Status: DISCONTINUED | OUTPATIENT
Start: 2021-08-13 | End: 2021-08-13 | Stop reason: HOSPADM

## 2021-08-13 RX ORDER — PROPOFOL 10 MG/ML
VIAL (ML) INTRAVENOUS CONTINUOUS PRN
Status: DISCONTINUED | OUTPATIENT
Start: 2021-08-13 | End: 2021-08-13 | Stop reason: SURG

## 2021-08-13 RX ORDER — PHENYLEPHRINE HCL IN 0.9% NACL 1 MG/10 ML
SYRINGE (ML) INTRAVENOUS AS NEEDED
Status: DISCONTINUED | OUTPATIENT
Start: 2021-08-13 | End: 2021-08-13 | Stop reason: SURG

## 2021-08-13 RX ORDER — ONDANSETRON 2 MG/ML
4 INJECTION INTRAMUSCULAR; INTRAVENOUS ONCE AS NEEDED
Status: DISCONTINUED | OUTPATIENT
Start: 2021-08-13 | End: 2021-08-13 | Stop reason: HOSPADM

## 2021-08-13 RX ORDER — HYDROCODONE BITARTRATE AND ACETAMINOPHEN 10; 325 MG/1; MG/1
1 TABLET ORAL EVERY 4 HOURS PRN
Status: DISCONTINUED | OUTPATIENT
Start: 2021-08-13 | End: 2021-08-13 | Stop reason: HOSPADM

## 2021-08-13 RX ORDER — SODIUM CHLORIDE, SODIUM LACTATE, POTASSIUM CHLORIDE, CALCIUM CHLORIDE 600; 310; 30; 20 MG/100ML; MG/100ML; MG/100ML; MG/100ML
1000 INJECTION, SOLUTION INTRAVENOUS CONTINUOUS
Status: DISCONTINUED | OUTPATIENT
Start: 2021-08-13 | End: 2021-08-13 | Stop reason: HOSPADM

## 2021-08-13 RX ORDER — MIDAZOLAM HYDROCHLORIDE 1 MG/ML
INJECTION INTRAMUSCULAR; INTRAVENOUS AS NEEDED
Status: DISCONTINUED | OUTPATIENT
Start: 2021-08-13 | End: 2021-08-13 | Stop reason: SURG

## 2021-08-13 RX ORDER — HYDROMORPHONE HCL 110MG/55ML
0.25 PATIENT CONTROLLED ANALGESIA SYRINGE INTRAVENOUS
Status: DISCONTINUED | OUTPATIENT
Start: 2021-08-13 | End: 2021-08-13 | Stop reason: HOSPADM

## 2021-08-13 RX ORDER — FENTANYL CITRATE 50 UG/ML
INJECTION, SOLUTION INTRAMUSCULAR; INTRAVENOUS AS NEEDED
Status: DISCONTINUED | OUTPATIENT
Start: 2021-08-13 | End: 2021-08-13 | Stop reason: SURG

## 2021-08-13 RX ORDER — LIDOCAINE HYDROCHLORIDE 10 MG/ML
0.5 INJECTION, SOLUTION INFILTRATION; PERINEURAL ONCE AS NEEDED
Status: DISCONTINUED | OUTPATIENT
Start: 2021-08-13 | End: 2021-08-13 | Stop reason: HOSPADM

## 2021-08-13 RX ADMIN — SODIUM CHLORIDE, SODIUM LACTATE, POTASSIUM CHLORIDE, AND CALCIUM CHLORIDE: .6; .31; .03; .02 INJECTION, SOLUTION INTRAVENOUS at 08:32

## 2021-08-13 RX ADMIN — FENTANYL CITRATE 50 MCG: 50 INJECTION, SOLUTION INTRAMUSCULAR; INTRAVENOUS at 08:33

## 2021-08-13 RX ADMIN — EPHEDRINE SULFATE 10 MG: 50 INJECTION INTRAVENOUS at 08:52

## 2021-08-13 RX ADMIN — Medication 100 MCG: at 09:00

## 2021-08-13 RX ADMIN — EPHEDRINE SULFATE 10 MG: 50 INJECTION INTRAVENOUS at 08:56

## 2021-08-13 RX ADMIN — CLINDAMYCIN PHOSPHATE 900 MG: 900 INJECTION, SOLUTION INTRAVENOUS at 08:35

## 2021-08-13 RX ADMIN — PROPOFOL 100 MCG/KG/MIN: 10 INJECTION, EMULSION INTRAVENOUS at 08:33

## 2021-08-13 RX ADMIN — MIDAZOLAM 1 MG: 1 INJECTION INTRAMUSCULAR; INTRAVENOUS at 08:33

## 2021-08-13 RX ADMIN — Medication 100 MCG: at 08:57

## 2021-08-13 NOTE — OP NOTE
AMPUTATION DIGIT  Procedure Report    Patient Name:  Emery Nesbitt  YOB: 1960    Date of Surgery:  8/13/2021     Indications: Osteomyelitis third toe right foot    Pre-op Diagnosis:   Ulcer of toe of right foot, with necrosis of bone (CMS/[facility]) [L97.514]       Post-Op Diagnosis Codes:     * Ulcer of toe of right foot, with necrosis of bone (CMS/HCC) [L97.514]    Procedure/CPT® Codes:      Procedure(s):  AMPUTATION OF THIRD TOE AND POSSIBLE FROZEN SECTION    Staff:  Surgeon(s):  Will Garcia DPM         Anesthesiologist: Thuan Lewis MD    Anesthesia: Monitored Anesthesia Care with [facility]    Estimated Blood Loss: 5 mL    Implants:    Nothing was implanted during the procedure        Findings: Unhealthy bone third toe right foot consistent with osteomyelitis    Complications: None    Description of Procedure: Patient has a longstanding history of an ulceration third toe right foot.  Patient has failed conservative treatment and at this point is requesting surgical intervention.  Patient is currently taken doxycycline based on his in office culture.  Patient was given the risk of surgery which include but not limited to bleeding infection nerve damage loss of toe loss of limb loss of life.  All questions were answered no guarantees given or implied about the outcome procedure.   Patient brought back to the operating room placed on the operating table supine position with pneumatic ankle tourniquet was placed about patient right ankle.  Once monitored anesthesia was achieved the right third toe was anesthetized and a third Nieto block consisting of an equal mixture of 10 cc of 2% lidocaine plain half percent Marcaine plain.   Right foot was then scrubbed prepped and draped in the usual aseptic manner the limb was then elevated exsanguinated and pneumatic ankle tourniquet was then inflated 250 mmHg.    Procedure #1 amputation third toe at the metatarsophalangeal joint.  Attention was then  directed to the third toe on the right foot where a fishmouth incision was made around the toe at the first MPJ extended down the third metatarsal with medial and lateral flaps.  Full-thickness flaps were maintained.  Good bleeding was noted during the procedure.  The third MPJ was identified and it was freed of all its soft tissues attachments and passed from the operating site.  Deep culture was also taken consisting of aerobic anaerobic acid-fast and fungal.  Toe was then sent to pathology for examination.  All bleeders were cauterized with the Bovie.  The wound was then flushed with about a liter of normal sterile saline.  Closure consisted of 3-0 Vicryl and 3-0 nylon in a simple suture technique.  Dressing consisted of Betadine Adaptic 4 x 4's cast padding Kerlix and an Ace in a surgical shoe.  Patient does not really ambulate but he can put weight on it in his surgical shoe surgical shoe can be off when in bed.  He is to keep dressing intact he is not to get it wet he is to follow-up with me next Thursday for dressing change call if there is any problems before he can resume Plavix tomorrow and he will continue with his doxycycline.       Will Garcia DPM     Date: 8/13/2021  Time: 09:12 EDT

## 2021-08-13 NOTE — ANESTHESIA POSTPROCEDURE EVALUATION
Patient: Emery Nesbitt    Procedure Summary     Date: 08/13/21 Room / Location: [facility] OR 08 / [facility] MAIN OR    Anesthesia Start: 0826 Anesthesia Stop: 0915    Procedure: AMPUTATION OF THIRD TOE AND POSSIBLE FROZEN SECTION (Right Foot) Diagnosis:       Ulcer of toe of right foot, with necrosis of bone (CMS/HCC)      (Ulcer of toe of right foot, with necrosis of bone (CMS/HCC) [L97.514])    Surgeons: Will Garcia DPM Provider: Thuan Lewis MD    Anesthesia Type: MAC ASA Status: 4          Anesthesia Type: MAC    Vitals  Vitals Value Taken Time   /54 08/13/21 0935   Temp 97.2 °F (36.2 °C) 08/13/21 0913   Pulse 80 08/13/21 0935   Resp 9 08/13/21 0923   SpO2 96 % 08/13/21 0935   Vitals shown include unvalidated device data.        Post Anesthesia Care and Evaluation    Patient location during evaluation: PACU  Patient participation: complete - patient participated  Level of consciousness: awake  Pain scale: See nurse's notes for pain score.  Pain management: adequate  Airway patency: patent  Anesthetic complications: No anesthetic complications  PONV Status: none  Cardiovascular status: acceptable  Respiratory status: acceptable  Hydration status: acceptable    Comments: Patient seen and examined postoperatively; vital signs stable; SpO2 greater than or equal to 90%; cardiopulmonary status stable; nausea/vomiting adequately controlled; pain adequately controlled; no apparent anesthesia complications; patient discharged from anesthesia care when discharge criteria were met

## 2021-08-13 NOTE — ANESTHESIA PREPROCEDURE EVALUATION
Anesthesia Evaluation     Patient summary reviewed   NPO Solid Status: > 8 hours  NPO Liquid Status: > 8 hours           Airway   Dental      Pulmonary    Cardiovascular   Exercise tolerance: poor (<4 METS)    (+) hypertension, CAD, PVD, hyperlipidemia,       Neuro/Psych  GI/Hepatic/Renal/Endo    (+)  GERD,  diabetes mellitus poorly controlled using insulin,     Musculoskeletal     Abdominal    Substance History      OB/GYN          Other                        Anesthesia Plan    ASA 4     MAC     intravenous induction     Anesthetic plan, all risks, benefits, and alternatives have been provided, discussed and informed consent has been obtained with: patient.

## 2021-08-15 LAB
BACTERIA SPEC AEROBE CULT: ABNORMAL
GRAM STN SPEC: ABNORMAL

## 2021-08-16 LAB
LAB AP CASE REPORT: NORMAL
PATH REPORT.FINAL DX SPEC: NORMAL
PATH REPORT.GROSS SPEC: NORMAL
QT INTERVAL: 399 MS

## 2021-08-18 LAB — BACTERIA SPEC ANAEROBE CULT: NORMAL

## 2021-09-09 ENCOUNTER — OFFICE VISIT (OUTPATIENT)
Dept: CARDIOLOGY | Facility: CLINIC | Age: 61
End: 2021-09-09

## 2021-09-09 VITALS
DIASTOLIC BLOOD PRESSURE: 79 MMHG | BODY MASS INDEX: 26.29 KG/M2 | SYSTOLIC BLOOD PRESSURE: 168 MMHG | HEART RATE: 54 BPM | OXYGEN SATURATION: 97 % | HEIGHT: 69 IN

## 2021-09-09 DIAGNOSIS — I10 ESSENTIAL HYPERTENSION: ICD-10-CM

## 2021-09-09 DIAGNOSIS — I73.9 PERIPHERAL VASCULAR DISEASE (HCC): Primary | ICD-10-CM

## 2021-09-09 DIAGNOSIS — E11.9 TYPE 2 DIABETES MELLITUS WITHOUT COMPLICATION, WITHOUT LONG-TERM CURRENT USE OF INSULIN (HCC): ICD-10-CM

## 2021-09-09 DIAGNOSIS — I25.118 CORONARY ARTERY DISEASE OF NATIVE ARTERY OF NATIVE HEART WITH STABLE ANGINA PECTORIS (HCC): ICD-10-CM

## 2021-09-09 DIAGNOSIS — E78.00 PURE HYPERCHOLESTEROLEMIA: ICD-10-CM

## 2021-09-09 PROCEDURE — 99214 OFFICE O/P EST MOD 30 MIN: CPT | Performed by: INTERNAL MEDICINE

## 2022-01-10 ENCOUNTER — LAB (OUTPATIENT)
Dept: LAB | Facility: HOSPITAL | Age: 62
End: 2022-01-10

## 2022-01-10 LAB — SARS-COV-2 ORF1AB RESP QL NAA+PROBE: NOT DETECTED

## 2022-01-10 PROCEDURE — C9803 HOPD COVID-19 SPEC COLLECT: HCPCS

## 2022-01-10 PROCEDURE — U0004 COV-19 TEST NON-CDC HGH THRU: HCPCS

## 2022-01-10 PROCEDURE — U0005 INFEC AGEN DETEC AMPLI PROBE: HCPCS

## 2022-01-10 RX ORDER — CIPROFLOXACIN 500 MG/1
500 TABLET, FILM COATED ORAL 2 TIMES DAILY
Status: ON HOLD | COMMUNITY
End: 2022-01-28

## 2022-01-11 NOTE — H&P
01/11/22   Foot and Ankle Surgery - Inpatient Follow-up  Provider: Dr. Will Garcia [facility]  Location: [facility]  Chief Complaint: Infection right foot  Subjective:  Emery Nesbitt is a 61 y.o. male that has osteomyelits 4th toe right and and diabetic foot infection. Pt is growing out mrsa and proteus mirabilis and was told need to be admitted last Thursday but refused. Pt was told that he needed ID to right foot with amputation of 4th toe and 5th toe. Pt does not want to lose his fifth toe. Pt has already had digits 1-3 removed and althouigh the 5th toe is not infected it is just a matter of time before he needs this toe removed. Pt currently on Bactrim ds and augmentin 500mg bid. Was initially placed on cipro and doxycycline but cultures were finalized on Monday and the abx were changed as noted earlier.           No Known Allergies    No current facility-administered medications on file prior to encounter.     Current Outpatient Medications on File Prior to Encounter   Medication Sig Dispense Refill   • amLODIPine (NORVASC) 5 MG tablet Take 5 mg by mouth Daily. Take dos     • atorvastatin (LIPITOR) 80 MG tablet Take 80 mg by mouth Every Night.     • cetirizine (zyrTEC) 10 MG tablet Take 10 mg by mouth Every Night.     • ciprofloxacin (CIPRO) 500 MG tablet Take 500 mg by mouth 2 (Two) Times a Day.     • clopidogrel (PLAVIX) 75 MG tablet Take 75 mg by mouth Daily. LD 1/10     • diphenhydrAMINE (BENADRYL) 25 mg capsule Take 25 mg by mouth Every 8 (Eight) Hours As Needed for Itching. Not dos     • doxycycline (VIBRAMYCIN) 100 MG capsule Take 100 mg by mouth 2 (Two) Times a Day. Take preop     • DULoxetine (CYMBALTA) 20 MG capsule Take 20 mg by mouth Daily. Take dos     • fenofibrate (TRICOR) 145 MG tablet Take 145 mg by mouth Daily. Take dos     • hydrALAZINE (APRESOLINE) 50 MG tablet Take 50 mg by mouth 3 (Three) Times a Day. Take dos     • HYDROcodone-acetaminophen (Norco)  MG per tablet Take 1 tablet  by mouth Every 6 (Six) Hours As Needed for Moderate Pain . (Patient taking differently: Take 1 tablet by mouth Every 6 (Six) Hours As Needed for Moderate Pain . May take preop if needed) 4 tablet 0   • loperamide (IMODIUM) 2 MG capsule Take 2 mg by mouth Every 4 (Four) Hours As Needed for Diarrhea. dont take preop     • magnesium oxide (MAGNESIUM-OXIDE) 400 (241.3 Mg) MG tablet tablet Take 400 mg by mouth Daily. Hold DOS     • metFORMIN (GLUCOPHAGE) 500 MG tablet Take 500 mg by mouth 2 (Two) Times a Day With Meals. LD 1/10     • metoprolol succinate XL (TOPROL-XL) 25 MG 24 hr tablet Take 25 mg by mouth Daily. Take dos     • nitroglycerin (NITROLINGUAL) 0.4 MG/SPRAY spray NITROLINGUAL 0.4 MG/SPRAY SOLN     • OXcarbazepine (TRILEPTAL) 300 MG tablet Take 150 mg by mouth Every Night.     • OXcarbazepine (TRILEPTAL) 300 MG tablet Take 300 mg by mouth 3 (Three) Times a Day. Take dos     • pregabalin (LYRICA) 150 MG capsule Take 150 mg by mouth Every 8 (Eight) Hours. Take dos     • promethazine (PHENERGAN) 12.5 MG tablet Take 25 mg by mouth Every 6 (Six) Hours As Needed for Nausea or Vomiting. May take preop if needed     • risperiDONE (risperDAL) 0.5 MG tablet Take 0.5 mg by mouth 2 (Two) Times a Day. Take dos     • simethicone (MYLICON) 80 MG chewable tablet Chew 80 mg Every 8 (Eight) Hours As Needed for Flatulence.     • sodium chloride 1 g tablet Take 1 g by mouth 3 (Three) Times a Day. Take preop     • traZODone (DESYREL) 50 MG tablet Take 100 mg by mouth Every Night.     • vitamin D3 125 MCG (5000 UT) capsule capsule Take 5,000 Units by mouth Daily. Hold DOS     • CloNIDine (CATAPRES) 0.2 MG tablet Take 0.1 mg by mouth 3 (Three) Times a Day As Needed.     • dicyclomine (BENTYL) 10 MG capsule Take 10 mg by mouth Every 6 (Six) Hours As Needed.     • docusate sodium (COLACE) 100 MG capsule Take 100 mg by mouth 2 (Two) Times a Day.     • Multiple Vitamins-Minerals (MULTIVITAMIN WITH MINERALS) tablet tablet Take 1 tablet  by mouth Daily.     • pantoprazole (PROTONIX) 40 MG EC tablet Take 40 mg by mouth Daily. Take dos         Objective   There were no vitals taken for this visit.    General: alert and oriented  x3   Vascular: pulses are difficult to palpate right foot but has been revascularized  Neuro: light touch and protective sensation diminished right foot   MSK:Foot derangement right foot with amputation 1-3 right. Adductovarus 4th and 5th toe right   Derm: Open ulceration right foot 4th toe at pipj that extends to distal metatarsals. The area is warm and hot and tender to palpation.             Assessment/Plan     Patient Active Problem List   Diagnosis   • Anemia   • Pre-operative cardiovascular examination   • Peripheral vascular disease (HCC)   • Hypertension   • Hyperlipidemia   • Diabetic foot ulcer (HCC)   • Diabetes mellitus (HCC)   • Coronary artery disease   • Coronary angioplasty status   • Claudication (HCC)   • Chest pain   • Bipolar affective disorder (HCC)   • Skin ulcer (HCC)   • Cellulitis of right toe   • Hyponatremia   • Hypomagnesemia   • Seasonal allergies   • Anxiety disorder   • RLS (restless legs syndrome)   • Peripheral neuropathy   • History of CVA (cerebrovascular accident)   • Diabetic ulcer of toe of right foot associated with type 2 diabetes mellitus, with bone involvement without evidence of necrosis (HCC)   Osteomeyltis with diabetic foot infection    Plan ID with amputation of 4th toe. Long discussion with patient given in opffice the need for iv abx or risk a more proximal amputation or death. Pt refused and signed ama note. Pt also told why 5th toe should be removed but he refuses this. Pt was told if worsens needs to go to er. Consent was signed and placed in chart.Pt will fu post op.      Note is dictated utilizing voice recognition software. Unfortunately this leads to occasional typographical errors. I apologize in advance if the situation occurs. If questions occur please do not hesitate  to call our office.

## 2022-01-12 ENCOUNTER — ANESTHESIA EVENT (OUTPATIENT)
Dept: PERIOP | Facility: HOSPITAL | Age: 62
End: 2022-01-12

## 2022-01-12 ENCOUNTER — ANESTHESIA (OUTPATIENT)
Dept: PERIOP | Facility: HOSPITAL | Age: 62
End: 2022-01-12

## 2022-01-12 ENCOUNTER — HOSPITAL ENCOUNTER (OUTPATIENT)
Facility: HOSPITAL | Age: 62
Setting detail: HOSPITAL OUTPATIENT SURGERY
Discharge: HOME | End: 2022-01-12
Attending: PODIATRIST | Admitting: PODIATRIST

## 2022-01-12 VITALS
TEMPERATURE: 96.9 F | OXYGEN SATURATION: 100 % | HEART RATE: 60 BPM | DIASTOLIC BLOOD PRESSURE: 69 MMHG | RESPIRATION RATE: 14 BRPM | WEIGHT: 200 LBS | BODY MASS INDEX: 29.62 KG/M2 | HEIGHT: 69 IN | SYSTOLIC BLOOD PRESSURE: 152 MMHG

## 2022-01-12 DIAGNOSIS — M86.10 ACUTE OSTEOMYELITIS: ICD-10-CM

## 2022-01-12 DIAGNOSIS — E11.621 DIABETIC ULCER OF TOE OF RIGHT FOOT ASSOCIATED WITH TYPE 2 DIABETES MELLITUS, WITH BONE INVOLVEMENT WITHOUT EVIDENCE OF NECROSIS: Primary | ICD-10-CM

## 2022-01-12 DIAGNOSIS — L97.516 DIABETIC ULCER OF TOE OF RIGHT FOOT ASSOCIATED WITH TYPE 2 DIABETES MELLITUS, WITH BONE INVOLVEMENT WITHOUT EVIDENCE OF NECROSIS: Primary | ICD-10-CM

## 2022-01-12 LAB
ANION GAP SERPL CALCULATED.3IONS-SCNC: 11 MMOL/L (ref 5–15)
BUN SERPL-MCNC: 11 MG/DL (ref 8–23)
BUN/CREAT SERPL: 13.4 (ref 7–25)
CALCIUM SPEC-SCNC: 9.1 MG/DL (ref 8.6–10.5)
CHLORIDE SERPL-SCNC: 95 MMOL/L (ref 98–107)
CO2 SERPL-SCNC: 24 MMOL/L (ref 22–29)
CREAT SERPL-MCNC: 0.82 MG/DL (ref 0.76–1.27)
DEPRECATED RDW RBC AUTO: 44.6 FL (ref 37–54)
ERYTHROCYTE [DISTWIDTH] IN BLOOD BY AUTOMATED COUNT: 15.6 % (ref 12.3–15.4)
GFR SERPL CREATININE-BSD FRML MDRD: 95 ML/MIN/1.73
GLUCOSE BLDC GLUCOMTR-MCNC: 72 MG/DL (ref 70–105)
GLUCOSE BLDC GLUCOMTR-MCNC: 80 MG/DL (ref 70–105)
GLUCOSE BLDC GLUCOMTR-MCNC: 80 MG/DL (ref 70–105)
GLUCOSE SERPL-MCNC: 143 MG/DL (ref 65–99)
HCT VFR BLD AUTO: 35.1 % (ref 37.5–51)
HGB BLD-MCNC: 12.1 G/DL (ref 13–17.7)
MCH RBC QN AUTO: 27.6 PG (ref 26.6–33)
MCHC RBC AUTO-ENTMCNC: 34.3 G/DL (ref 31.5–35.7)
MCV RBC AUTO: 80.4 FL (ref 79–97)
PLATELET # BLD AUTO: 308 10*3/MM3 (ref 140–450)
PMV BLD AUTO: 7.5 FL (ref 6–12)
POTASSIUM SERPL-SCNC: 4.2 MMOL/L (ref 3.5–5.2)
RBC # BLD AUTO: 4.37 10*6/MM3 (ref 4.14–5.8)
SODIUM SERPL-SCNC: 130 MMOL/L (ref 136–145)
WBC NRBC COR # BLD: 4.5 10*3/MM3 (ref 3.4–10.8)

## 2022-01-12 PROCEDURE — 87116 MYCOBACTERIA CULTURE: CPT | Performed by: PODIATRIST

## 2022-01-12 PROCEDURE — 25010000002 PROPOFOL 500 MG/50ML EMULSION: Performed by: STUDENT IN AN ORGANIZED HEALTH CARE EDUCATION/TRAINING PROGRAM

## 2022-01-12 PROCEDURE — 25010000002 VANCOMYCIN HCL 1.25 G RECONSTITUTED SOLUTION 1 EACH VIAL: Performed by: PODIATRIST

## 2022-01-12 PROCEDURE — 88311 DECALCIFY TISSUE: CPT | Performed by: PODIATRIST

## 2022-01-12 PROCEDURE — 25010000002 VANCOMYCIN 10 G RECONSTITUTED SOLUTION: Performed by: STUDENT IN AN ORGANIZED HEALTH CARE EDUCATION/TRAINING PROGRAM

## 2022-01-12 PROCEDURE — 80048 BASIC METABOLIC PNL TOTAL CA: CPT | Performed by: PODIATRIST

## 2022-01-12 PROCEDURE — 87176 TISSUE HOMOGENIZATION CULTR: CPT | Performed by: PODIATRIST

## 2022-01-12 PROCEDURE — 87077 CULTURE AEROBIC IDENTIFY: CPT | Performed by: PODIATRIST

## 2022-01-12 PROCEDURE — 87147 CULTURE TYPE IMMUNOLOGIC: CPT | Performed by: PODIATRIST

## 2022-01-12 PROCEDURE — 87186 SC STD MICRODIL/AGAR DIL: CPT | Performed by: PODIATRIST

## 2022-01-12 PROCEDURE — 87206 SMEAR FLUORESCENT/ACID STAI: CPT | Performed by: PODIATRIST

## 2022-01-12 PROCEDURE — 87075 CULTR BACTERIA EXCEPT BLOOD: CPT | Performed by: PODIATRIST

## 2022-01-12 PROCEDURE — 87205 SMEAR GRAM STAIN: CPT | Performed by: PODIATRIST

## 2022-01-12 PROCEDURE — 87102 FUNGUS ISOLATION CULTURE: CPT | Performed by: PODIATRIST

## 2022-01-12 PROCEDURE — 88305 TISSUE EXAM BY PATHOLOGIST: CPT | Performed by: PODIATRIST

## 2022-01-12 PROCEDURE — 85027 COMPLETE CBC AUTOMATED: CPT | Performed by: PODIATRIST

## 2022-01-12 PROCEDURE — 82962 GLUCOSE BLOOD TEST: CPT

## 2022-01-12 PROCEDURE — 87070 CULTURE OTHR SPECIMN AEROBIC: CPT | Performed by: PODIATRIST

## 2022-01-12 RX ORDER — BUPIVACAINE HYDROCHLORIDE 5 MG/ML
INJECTION, SOLUTION PERINEURAL AS NEEDED
Status: DISCONTINUED | OUTPATIENT
Start: 2022-01-12 | End: 2022-01-12 | Stop reason: HOSPADM

## 2022-01-12 RX ORDER — PROPOFOL 10 MG/ML
INJECTION, EMULSION INTRAVENOUS AS NEEDED
Status: DISCONTINUED | OUTPATIENT
Start: 2022-01-12 | End: 2022-01-12 | Stop reason: SURG

## 2022-01-12 RX ORDER — SODIUM CHLORIDE, SODIUM LACTATE, POTASSIUM CHLORIDE, AND CALCIUM CHLORIDE .6; .31; .03; .02 G/100ML; G/100ML; G/100ML; G/100ML
20 INJECTION, SOLUTION INTRAVENOUS CONTINUOUS
Status: DISCONTINUED | OUTPATIENT
Start: 2022-01-12 | End: 2022-01-12 | Stop reason: HOSPADM

## 2022-01-12 RX ORDER — HYDROMORPHONE HCL 110MG/55ML
0.5 PATIENT CONTROLLED ANALGESIA SYRINGE INTRAVENOUS
Status: DISCONTINUED | OUTPATIENT
Start: 2022-01-12 | End: 2022-01-12 | Stop reason: HOSPADM

## 2022-01-12 RX ORDER — LIDOCAINE HYDROCHLORIDE 20 MG/ML
INJECTION, SOLUTION INFILTRATION; PERINEURAL AS NEEDED
Status: DISCONTINUED | OUTPATIENT
Start: 2022-01-12 | End: 2022-01-12 | Stop reason: HOSPADM

## 2022-01-12 RX ADMIN — SODIUM CHLORIDE, POTASSIUM CHLORIDE, SODIUM LACTATE AND CALCIUM CHLORIDE 20 ML/HR: 600; 310; 30; 20 INJECTION, SOLUTION INTRAVENOUS at 13:35

## 2022-01-12 RX ADMIN — VANCOMYCIN HYDROCHLORIDE 1250 MG: 1.25 INJECTION, POWDER, LYOPHILIZED, FOR SOLUTION INTRAVENOUS at 13:35

## 2022-01-12 RX ADMIN — Medication 1250 MG: at 13:57

## 2022-01-12 RX ADMIN — PROPOFOL INJECTABLE EMULSION 50 MG: 10 INJECTION, EMULSION INTRAVENOUS at 14:00

## 2022-01-12 RX ADMIN — PROPOFOL INJECTABLE EMULSION 75 MCG/KG/MIN: 10 INJECTION, EMULSION INTRAVENOUS at 14:01

## 2022-01-12 NOTE — ANESTHESIA POSTPROCEDURE EVALUATION
Patient: Emery Nesbitt    Procedure Summary     Date: 01/12/22 Room / Location: [facility] OR 12 / [facility] MAIN OR    Anesthesia Start: 1357 Anesthesia Stop: 1452    Procedure: RIGHT 4TH TOE/METATARSAL AMPUTATION DIGIT (Right Foot) Diagnosis:       Acute osteomyelitis (HCC)      (Acute osteomyelitis (HCC) [M86.10])    Surgeons: Will Garcia DPM Provider: Anupam Jones MD    Anesthesia Type: MAC ASA Status: 4          Anesthesia Type: MAC    Vitals  Vitals Value Taken Time   /62 01/12/22 1520   Temp     Pulse 61 01/12/22 1520   Resp 11 01/12/22 1520   SpO2 97 % 01/12/22 1520           Post Anesthesia Care and Evaluation    Patient location during evaluation: PACU  Patient participation: complete - patient participated  Level of consciousness: awake  Pain score: 0  Pain management: adequate  Airway patency: patent  Anesthetic complications: No anesthetic complications  PONV Status: none  Cardiovascular status: acceptable  Respiratory status: acceptable  Hydration status: acceptable    Comments: Patient seen and examined postoperatively; vital signs stable; SpO2 greater than or equal to 90%; cardiopulmonary status stable; nausea/vomiting adequately controlled; pain adequately controlled; no apparent anesthesia complications; patient discharged from anesthesia care when discharge criteria were met

## 2022-01-12 NOTE — OP NOTE
AMPUTATION DIGIT  Procedure Report    Patient Name:  Emery Nesbitt  YOB: 1960    Date of Surgery:  1/12/2022     Indications: Osteomyelitis fourth toe right foot  Pre-op Diagnosis:   Acute osteomyelitis (HCC) [M86.10]       Post-Op Diagnosis Codes:     * Acute osteomyelitis (HCC) [M86.10]    Procedure/CPT® Codes:      Procedure(s):  RIGHT 4TH TOE/METATARSAL AMPUTATION DIGIT    Staff:  Surgeon(s):  Will Garcia DPM         Anesthesiologist: Anupam Jones MD    Anesthesia: Monitored Anesthesia Care with Regional    Estimated Blood Loss: Less than 10 cc    Implants:    Nothing was implanted during the procedure        Findings: Infected fourth toe right    Complications: None    Description of Procedure: Patient is a pleasant 61-year-old male that presents today for fourth toe amputation.  Patient has already had 1 through 3 amputated and now he has an infection in the fourth.  He was seen last Thursday where he had a severe infection I tried to admit him to the hospital but he refused.  He signed an AMA form stating that he may lose his leg or possibly his life.  In the meantime cultures were pending and he was currently taken doxycycline and Augmentin.  Cultures were received Monday which he did grow out MRSA which was resistant to tetracycline so we switched this to Bactrim and he also had protease mirabilis which was sensitive to the Augmentin.  Patient presents today as a hospital with no nausea vomiting fevers chills or diarrhea.  Patient was once again informed that he should have his fifth toe removed along with his fourth.  Patient is however refusing to have his fifth toe removed we explained to him that it would only get in the way at some point down the road and would eventually be removed.  Patient just wants his fourth toe removed.  A consent was signed and placed in chart all questions were answered no guarantees given or implied about the outcome of the procedure.  Patient  brought back to the operating room placed on operating table supine position pneumatic tourniquet was placed about the patient's right ankle.  A preoperative block of an equal mixture of 2 cc lidocaine plain half percent Marcaine plain was infiltrated about the fourth ray 18 cc total were used.  Right foot was then scrubbed prepped and draped in the usual aseptic manner.    Procedure #1 amputation fourth toe right foot:  Attention was directed to the fourth toe of the right foot where a medial and lateral fishmouth incision was made extending down the fourth metatarsal.  Full-thickness flaps were maintained.  The skin was peeled off of the fourth toe until the fourth MPJ was identified.  At this time the toe was disarticulated at the fourth MPJ.  The erythema and edema had markedly improved since last Thursday.  No pus pockets were identified.  A deep culture however was done consisting of aerobic anaerobic acid-fast and fungal.  The toe was passed off and sent to pathology for examination to rule out osteo myelitis.  At this time over 1 L was infiltrated about the incision site and all tissue that was present was noted to be healthy.  Prior to the flush any unhealthy tissue was removed.  Good bleeding was noted throughout the procedure and controlled with direct pressure.  Closure consisted of 3-0 Vicryl and 3-0 nylon in a simple suture technique.  The ankle tourniquet was never inflated during the procedure.  Postoperative dressing consisting of Betadine Adaptic 4 x 4's cast padding and ABD pad Kerlix and an Ace bandage was applied along with a surgical shoe.  Written and oral postoperative instructions were given which include only to transfer which is all that he does.  Keep dressing intact.  Continue with Bactrim and Augmentin.  Follow-up with me preferably early next week    Will Garcia DPM     Date: 1/12/2022  Time: 14:48 EST

## 2022-01-12 NOTE — ANESTHESIA PREPROCEDURE EVALUATION
Anesthesia Evaluation     Patient summary reviewed and Nursing notes reviewed   NPO Solid Status: > 8 hours  NPO Liquid Status: > 8 hours           Airway   Mallampati: II  TM distance: >3 FB  Dental - normal exam   (+) edentulous    Pulmonary - normal exam   Cardiovascular - normal exam  Exercise tolerance: poor (<4 METS)    ECG reviewed  PT is on anticoagulation therapy  Patient on routine beta blocker    (+) hypertension, CAD, PVD, hyperlipidemia,       Neuro/Psych  (+) CVA (pt denies cva; no residual), weakness, numbness, psychiatric history Anxiety, Depression, Bipolar and Schizophrenia, poor historian.,     GI/Hepatic/Renal/Endo    (+) obesity,  GERD,  diabetes mellitus poorly controlled using insulin,     Musculoskeletal     (+) chronic pain,   Abdominal    Substance History      OB/GYN          Other   arthritis, blood dyscrasia anemia,     ROS/Med Hx Other: Wheelchair bound; lives in [facility]                  Anesthesia Plan    ASA 4     MAC     intravenous induction     Anesthetic plan, all risks, benefits, and alternatives have been provided, discussed and informed consent has been obtained with: patient.

## 2022-01-14 LAB
LAB AP CASE REPORT: NORMAL
PATH REPORT.FINAL DX SPEC: NORMAL
PATH REPORT.GROSS SPEC: NORMAL

## 2022-01-17 LAB
BACTERIA SPEC AEROBE CULT: ABNORMAL
BACTERIA SPEC AEROBE CULT: ABNORMAL
BACTERIA SPEC ANAEROBE CULT: NORMAL
GRAM STN SPEC: ABNORMAL

## 2022-01-27 ENCOUNTER — APPOINTMENT (OUTPATIENT)
Dept: CT IMAGING | Facility: HOSPITAL | Age: 62
End: 2022-01-27

## 2022-01-27 ENCOUNTER — HOSPITAL ENCOUNTER (INPATIENT)
Facility: HOSPITAL | Age: 62
LOS: 2 days | Discharge: NURSING, REHAB OR LONG-TERM CARE | End: 2022-01-29
Attending: EMERGENCY MEDICINE | Admitting: INTERNAL MEDICINE

## 2022-01-27 DIAGNOSIS — L97.516 DIABETIC ULCER OF TOE OF RIGHT FOOT ASSOCIATED WITH TYPE 2 DIABETES MELLITUS, WITH BONE INVOLVEMENT WITHOUT EVIDENCE OF NECROSIS: ICD-10-CM

## 2022-01-27 DIAGNOSIS — N39.0 URINARY TRACT INFECTION WITHOUT HEMATURIA, SITE UNSPECIFIED: ICD-10-CM

## 2022-01-27 DIAGNOSIS — E87.1 HYPONATREMIA: ICD-10-CM

## 2022-01-27 DIAGNOSIS — R41.82 ALTERED MENTAL STATUS, UNSPECIFIED ALTERED MENTAL STATUS TYPE: Primary | ICD-10-CM

## 2022-01-27 DIAGNOSIS — E11.621 DIABETIC ULCER OF TOE OF RIGHT FOOT ASSOCIATED WITH TYPE 2 DIABETES MELLITUS, WITH BONE INVOLVEMENT WITHOUT EVIDENCE OF NECROSIS: ICD-10-CM

## 2022-01-27 PROBLEM — I65.22 ICAO (INTERNAL CAROTID ARTERY OCCLUSION), LEFT: Status: ACTIVE | Noted: 2022-01-27

## 2022-01-27 LAB
ALBUMIN SERPL-MCNC: 4.2 G/DL (ref 3.5–5.2)
ALBUMIN/GLOB SERPL: 1.6 G/DL
ALP SERPL-CCNC: 65 U/L (ref 39–117)
ALT SERPL W P-5'-P-CCNC: 16 U/L (ref 1–41)
ANION GAP SERPL CALCULATED.3IONS-SCNC: 11 MMOL/L (ref 5–15)
APTT PPP: 31.2 SECONDS (ref 24–31)
AST SERPL-CCNC: 20 U/L (ref 1–40)
BACTERIA UR QL AUTO: ABNORMAL /HPF
BASOPHILS # BLD AUTO: 0 10*3/MM3 (ref 0–0.2)
BASOPHILS NFR BLD AUTO: 0.3 % (ref 0–1.5)
BILIRUB SERPL-MCNC: 0.8 MG/DL (ref 0–1.2)
BILIRUB UR QL STRIP: NEGATIVE
BUN SERPL-MCNC: 16 MG/DL (ref 8–23)
BUN/CREAT SERPL: 16.3 (ref 7–25)
CALCIUM SPEC-SCNC: 9.7 MG/DL (ref 8.6–10.5)
CHLORIDE SERPL-SCNC: 87 MMOL/L (ref 98–107)
CHOLEST SERPL-MCNC: 126 MG/DL (ref 0–200)
CLARITY UR: CLEAR
CO2 SERPL-SCNC: 24 MMOL/L (ref 22–29)
COLOR UR: ABNORMAL
CREAT SERPL-MCNC: 0.98 MG/DL (ref 0.76–1.27)
DEPRECATED RDW RBC AUTO: 47.3 FL (ref 37–54)
EOSINOPHIL # BLD AUTO: 0 10*3/MM3 (ref 0–0.4)
EOSINOPHIL NFR BLD AUTO: 0.1 % (ref 0.3–6.2)
ERYTHROCYTE [DISTWIDTH] IN BLOOD BY AUTOMATED COUNT: 16.2 % (ref 12.3–15.4)
GFR SERPL CREATININE-BSD FRML MDRD: 77 ML/MIN/1.73
GLOBULIN UR ELPH-MCNC: 2.6 GM/DL
GLUCOSE SERPL-MCNC: 114 MG/DL (ref 65–99)
GLUCOSE UR STRIP-MCNC: NEGATIVE MG/DL
HCT VFR BLD AUTO: 34.5 % (ref 37.5–51)
HDLC SERPL-MCNC: 59 MG/DL (ref 40–60)
HGB BLD-MCNC: 11.7 G/DL (ref 13–17.7)
HGB UR QL STRIP.AUTO: ABNORMAL
HYALINE CASTS UR QL AUTO: ABNORMAL /LPF
INR PPP: 1.1 (ref 0.93–1.1)
KETONES UR QL STRIP: NEGATIVE
LDLC SERPL CALC-MCNC: 51 MG/DL (ref 0–100)
LDLC/HDLC SERPL: 0.87 {RATIO}
LEUKOCYTE ESTERASE UR QL STRIP.AUTO: ABNORMAL
LYMPHOCYTES # BLD AUTO: 0.8 10*3/MM3 (ref 0.7–3.1)
LYMPHOCYTES NFR BLD AUTO: 5.6 % (ref 19.6–45.3)
MAGNESIUM SERPL-MCNC: 1 MG/DL (ref 1.6–2.4)
MCH RBC QN AUTO: 28.4 PG (ref 26.6–33)
MCHC RBC AUTO-ENTMCNC: 33.9 G/DL (ref 31.5–35.7)
MCV RBC AUTO: 83.8 FL (ref 79–97)
MONOCYTES # BLD AUTO: 1.5 10*3/MM3 (ref 0.1–0.9)
MONOCYTES NFR BLD AUTO: 9.9 % (ref 5–12)
NEUTROPHILS NFR BLD AUTO: 12.5 10*3/MM3 (ref 1.7–7)
NEUTROPHILS NFR BLD AUTO: 84.1 % (ref 42.7–76)
NITRITE UR QL STRIP: NEGATIVE
NRBC BLD AUTO-RTO: 0.1 /100 WBC (ref 0–0.2)
PH UR STRIP.AUTO: 6.5 [PH] (ref 5–8)
PLATELET # BLD AUTO: 181 10*3/MM3 (ref 140–450)
PMV BLD AUTO: 8.8 FL (ref 6–12)
POTASSIUM SERPL-SCNC: 3.7 MMOL/L (ref 3.5–5.2)
PROT SERPL-MCNC: 6.8 G/DL (ref 6–8.5)
PROT UR QL STRIP: NEGATIVE
PROTHROMBIN TIME: 12.1 SECONDS (ref 9.6–11.7)
RBC # BLD AUTO: 4.12 10*6/MM3 (ref 4.14–5.8)
RBC # UR STRIP: ABNORMAL /HPF
REF LAB TEST METHOD: ABNORMAL
SARS-COV-2 RNA PNL SPEC NAA+PROBE: NOT DETECTED
SODIUM SERPL-SCNC: 122 MMOL/L (ref 136–145)
SP GR UR STRIP: 1.01 (ref 1–1.03)
SQUAMOUS #/AREA URNS HPF: ABNORMAL /HPF
TRIGL SERPL-MCNC: 79 MG/DL (ref 0–150)
TSH SERPL DL<=0.05 MIU/L-ACNC: 1.19 UIU/ML (ref 0.27–4.2)
UROBILINOGEN UR QL STRIP: ABNORMAL
VLDLC SERPL-MCNC: 16 MG/DL (ref 5–40)
WBC # UR STRIP: ABNORMAL /HPF
WBC NRBC COR # BLD: 14.8 10*3/MM3 (ref 3.4–10.8)

## 2022-01-27 PROCEDURE — 85730 THROMBOPLASTIN TIME PARTIAL: CPT | Performed by: EMERGENCY MEDICINE

## 2022-01-27 PROCEDURE — U0003 INFECTIOUS AGENT DETECTION BY NUCLEIC ACID (DNA OR RNA); SEVERE ACUTE RESPIRATORY SYNDROME CORONAVIRUS 2 (SARS-COV-2) (CORONAVIRUS DISEASE [COVID-19]), AMPLIFIED PROBE TECHNIQUE, MAKING USE OF HIGH THROUGHPUT TECHNOLOGIES AS DESCRIBED BY CMS-2020-01-R: HCPCS | Performed by: EMERGENCY MEDICINE

## 2022-01-27 PROCEDURE — 4A03X5D MEASUREMENT OF ARTERIAL FLOW, INTRACRANIAL, EXTERNAL APPROACH: ICD-10-PCS | Performed by: RADIOLOGY

## 2022-01-27 PROCEDURE — 70498 CT ANGIOGRAPHY NECK: CPT

## 2022-01-27 PROCEDURE — 25010000002 MAGNESIUM SULFATE 2 GM/50ML SOLUTION: Performed by: EMERGENCY MEDICINE

## 2022-01-27 PROCEDURE — 99285 EMERGENCY DEPT VISIT HI MDM: CPT

## 2022-01-27 PROCEDURE — 87086 URINE CULTURE/COLONY COUNT: CPT | Performed by: EMERGENCY MEDICINE

## 2022-01-27 PROCEDURE — 93005 ELECTROCARDIOGRAM TRACING: CPT | Performed by: EMERGENCY MEDICINE

## 2022-01-27 PROCEDURE — 80053 COMPREHEN METABOLIC PANEL: CPT | Performed by: EMERGENCY MEDICINE

## 2022-01-27 PROCEDURE — 87077 CULTURE AEROBIC IDENTIFY: CPT | Performed by: EMERGENCY MEDICINE

## 2022-01-27 PROCEDURE — 85025 COMPLETE CBC W/AUTO DIFF WBC: CPT | Performed by: NURSE PRACTITIONER

## 2022-01-27 PROCEDURE — 99222 1ST HOSP IP/OBS MODERATE 55: CPT | Performed by: NURSE PRACTITIONER

## 2022-01-27 PROCEDURE — 83935 ASSAY OF URINE OSMOLALITY: CPT | Performed by: INTERNAL MEDICINE

## 2022-01-27 PROCEDURE — 25010000002 CEFTRIAXONE PER 250 MG: Performed by: EMERGENCY MEDICINE

## 2022-01-27 PROCEDURE — 36415 COLL VENOUS BLD VENIPUNCTURE: CPT | Performed by: NURSE PRACTITIONER

## 2022-01-27 PROCEDURE — 82607 VITAMIN B-12: CPT | Performed by: NURSE PRACTITIONER

## 2022-01-27 PROCEDURE — 70496 CT ANGIOGRAPHY HEAD: CPT

## 2022-01-27 PROCEDURE — 0 IOPAMIDOL PER 1 ML: Performed by: EMERGENCY MEDICINE

## 2022-01-27 PROCEDURE — P9612 CATHETERIZE FOR URINE SPEC: HCPCS

## 2022-01-27 PROCEDURE — 84133 ASSAY OF URINE POTASSIUM: CPT | Performed by: INTERNAL MEDICINE

## 2022-01-27 PROCEDURE — U0005 INFEC AGEN DETEC AMPLI PROBE: HCPCS | Performed by: EMERGENCY MEDICINE

## 2022-01-27 PROCEDURE — 84443 ASSAY THYROID STIM HORMONE: CPT | Performed by: EMERGENCY MEDICINE

## 2022-01-27 PROCEDURE — 70450 CT HEAD/BRAIN W/O DYE: CPT

## 2022-01-27 PROCEDURE — 81001 URINALYSIS AUTO W/SCOPE: CPT | Performed by: EMERGENCY MEDICINE

## 2022-01-27 PROCEDURE — 83880 ASSAY OF NATRIURETIC PEPTIDE: CPT | Performed by: NURSE PRACTITIONER

## 2022-01-27 PROCEDURE — 83735 ASSAY OF MAGNESIUM: CPT | Performed by: NURSE PRACTITIONER

## 2022-01-27 PROCEDURE — 87040 BLOOD CULTURE FOR BACTERIA: CPT | Performed by: EMERGENCY MEDICINE

## 2022-01-27 PROCEDURE — 83735 ASSAY OF MAGNESIUM: CPT | Performed by: EMERGENCY MEDICINE

## 2022-01-27 PROCEDURE — 84300 ASSAY OF URINE SODIUM: CPT | Performed by: INTERNAL MEDICINE

## 2022-01-27 PROCEDURE — 87186 SC STD MICRODIL/AGAR DIL: CPT | Performed by: EMERGENCY MEDICINE

## 2022-01-27 PROCEDURE — 83036 HEMOGLOBIN GLYCOSYLATED A1C: CPT | Performed by: NURSE PRACTITIONER

## 2022-01-27 PROCEDURE — 85025 COMPLETE CBC W/AUTO DIFF WBC: CPT | Performed by: EMERGENCY MEDICINE

## 2022-01-27 PROCEDURE — 85610 PROTHROMBIN TIME: CPT | Performed by: EMERGENCY MEDICINE

## 2022-01-27 PROCEDURE — 80061 LIPID PANEL: CPT | Performed by: NURSE PRACTITIONER

## 2022-01-27 RX ORDER — NITROGLYCERIN 0.4 MG/1
0.4 TABLET SUBLINGUAL
Status: DISCONTINUED | OUTPATIENT
Start: 2022-01-27 | End: 2022-01-29 | Stop reason: HOSPADM

## 2022-01-27 RX ORDER — ONDANSETRON 2 MG/ML
4 INJECTION INTRAMUSCULAR; INTRAVENOUS EVERY 6 HOURS PRN
Status: DISCONTINUED | OUTPATIENT
Start: 2022-01-27 | End: 2022-01-29 | Stop reason: HOSPADM

## 2022-01-27 RX ORDER — ONDANSETRON 2 MG/ML
4 INJECTION INTRAMUSCULAR; INTRAVENOUS EVERY 6 HOURS PRN
Status: DISCONTINUED | OUTPATIENT
Start: 2022-01-27 | End: 2022-01-27 | Stop reason: SDUPTHER

## 2022-01-27 RX ORDER — POTASSIUM CHLORIDE 20 MEQ/1
40 TABLET, EXTENDED RELEASE ORAL AS NEEDED
Status: DISCONTINUED | OUTPATIENT
Start: 2022-01-27 | End: 2022-01-29 | Stop reason: HOSPADM

## 2022-01-27 RX ORDER — MAGNESIUM SULFATE HEPTAHYDRATE 40 MG/ML
2 INJECTION, SOLUTION INTRAVENOUS AS NEEDED
Status: DISCONTINUED | OUTPATIENT
Start: 2022-01-27 | End: 2022-01-29 | Stop reason: ALTCHOICE

## 2022-01-27 RX ORDER — DEXTROSE MONOHYDRATE 25 G/50ML
25 INJECTION, SOLUTION INTRAVENOUS
Status: DISCONTINUED | OUTPATIENT
Start: 2022-01-27 | End: 2022-01-29 | Stop reason: HOSPADM

## 2022-01-27 RX ORDER — MAGNESIUM SULFATE 1 G/100ML
1 INJECTION INTRAVENOUS AS NEEDED
Status: DISCONTINUED | OUTPATIENT
Start: 2022-01-27 | End: 2022-01-29 | Stop reason: ALTCHOICE

## 2022-01-27 RX ORDER — ALUMINA, MAGNESIA, AND SIMETHICONE 2400; 2400; 240 MG/30ML; MG/30ML; MG/30ML
15 SUSPENSION ORAL EVERY 6 HOURS PRN
Status: DISCONTINUED | OUTPATIENT
Start: 2022-01-27 | End: 2022-01-29 | Stop reason: HOSPADM

## 2022-01-27 RX ORDER — OLANZAPINE 10 MG/2ML
1 INJECTION, POWDER, LYOPHILIZED, FOR SOLUTION INTRAMUSCULAR AS NEEDED
Status: DISCONTINUED | OUTPATIENT
Start: 2022-01-27 | End: 2022-01-29 | Stop reason: HOSPADM

## 2022-01-27 RX ORDER — INSULIN LISPRO 100 [IU]/ML
0-9 INJECTION, SOLUTION INTRAVENOUS; SUBCUTANEOUS EVERY 6 HOURS SCHEDULED
Status: DISCONTINUED | OUTPATIENT
Start: 2022-01-28 | End: 2022-01-29

## 2022-01-27 RX ORDER — SODIUM CHLORIDE 0.9 % (FLUSH) 0.9 %
10 SYRINGE (ML) INJECTION AS NEEDED
Status: DISCONTINUED | OUTPATIENT
Start: 2022-01-27 | End: 2022-01-29 | Stop reason: HOSPADM

## 2022-01-27 RX ORDER — ONDANSETRON 4 MG/1
4 TABLET, FILM COATED ORAL EVERY 6 HOURS PRN
Status: DISCONTINUED | OUTPATIENT
Start: 2022-01-27 | End: 2022-01-29 | Stop reason: HOSPADM

## 2022-01-27 RX ORDER — ASPIRIN 325 MG
325 TABLET ORAL DAILY
Status: DISCONTINUED | OUTPATIENT
Start: 2022-01-28 | End: 2022-01-29 | Stop reason: HOSPADM

## 2022-01-27 RX ORDER — ACETAMINOPHEN 650 MG/1
650 SUPPOSITORY RECTAL EVERY 4 HOURS PRN
Status: DISCONTINUED | OUTPATIENT
Start: 2022-01-27 | End: 2022-01-29 | Stop reason: HOSPADM

## 2022-01-27 RX ORDER — INSULIN LISPRO 100 [IU]/ML
0-9 INJECTION, SOLUTION INTRAVENOUS; SUBCUTANEOUS AS NEEDED
Status: DISCONTINUED | OUTPATIENT
Start: 2022-01-27 | End: 2022-01-29

## 2022-01-27 RX ORDER — ACETAMINOPHEN 325 MG/1
650 TABLET ORAL EVERY 4 HOURS PRN
Status: DISCONTINUED | OUTPATIENT
Start: 2022-01-27 | End: 2022-01-29 | Stop reason: HOSPADM

## 2022-01-27 RX ORDER — MAGNESIUM SULFATE HEPTAHYDRATE 40 MG/ML
2 INJECTION, SOLUTION INTRAVENOUS ONCE
Status: COMPLETED | OUTPATIENT
Start: 2022-01-27 | End: 2022-01-27

## 2022-01-27 RX ORDER — ACETAMINOPHEN 160 MG/5ML
650 SOLUTION ORAL EVERY 4 HOURS PRN
Status: DISCONTINUED | OUTPATIENT
Start: 2022-01-27 | End: 2022-01-29 | Stop reason: HOSPADM

## 2022-01-27 RX ORDER — POTASSIUM CHLORIDE 1.5 G/1.77G
40 POWDER, FOR SOLUTION ORAL AS NEEDED
Status: DISCONTINUED | OUTPATIENT
Start: 2022-01-27 | End: 2022-01-29 | Stop reason: HOSPADM

## 2022-01-27 RX ORDER — ASPIRIN 300 MG/1
300 SUPPOSITORY RECTAL DAILY
Status: DISCONTINUED | OUTPATIENT
Start: 2022-01-28 | End: 2022-01-29 | Stop reason: HOSPADM

## 2022-01-27 RX ORDER — NICOTINE POLACRILEX 4 MG
15 LOZENGE BUCCAL
Status: DISCONTINUED | OUTPATIENT
Start: 2022-01-27 | End: 2022-01-29 | Stop reason: HOSPADM

## 2022-01-27 RX ORDER — LABETALOL HYDROCHLORIDE 5 MG/ML
20 INJECTION, SOLUTION INTRAVENOUS
Status: DISCONTINUED | OUTPATIENT
Start: 2022-01-27 | End: 2022-01-29 | Stop reason: HOSPADM

## 2022-01-27 RX ORDER — SODIUM CHLORIDE 0.9 % (FLUSH) 0.9 %
10 SYRINGE (ML) INJECTION EVERY 12 HOURS SCHEDULED
Status: DISCONTINUED | OUTPATIENT
Start: 2022-01-27 | End: 2022-01-29 | Stop reason: HOSPADM

## 2022-01-27 RX ORDER — CALCIUM CARBONATE 200(500)MG
2 TABLET,CHEWABLE ORAL 2 TIMES DAILY PRN
Status: DISCONTINUED | OUTPATIENT
Start: 2022-01-27 | End: 2022-01-29 | Stop reason: HOSPADM

## 2022-01-27 RX ADMIN — IOPAMIDOL 100 ML: 755 INJECTION, SOLUTION INTRAVENOUS at 19:40

## 2022-01-27 RX ADMIN — Medication 10 ML: at 22:04

## 2022-01-27 RX ADMIN — MAGNESIUM SULFATE HEPTAHYDRATE 2 G: 2 INJECTION, SOLUTION INTRAVENOUS at 21:07

## 2022-01-27 RX ADMIN — CEFTRIAXONE 2 G: 10 INJECTION, POWDER, FOR SOLUTION INTRAVENOUS at 20:54

## 2022-01-28 ENCOUNTER — APPOINTMENT (OUTPATIENT)
Dept: MRI IMAGING | Facility: HOSPITAL | Age: 62
End: 2022-01-28

## 2022-01-28 ENCOUNTER — APPOINTMENT (OUTPATIENT)
Dept: CARDIOLOGY | Facility: HOSPITAL | Age: 62
End: 2022-01-28

## 2022-01-28 LAB
ANION GAP SERPL CALCULATED.3IONS-SCNC: 12 MMOL/L (ref 5–15)
ANION GAP SERPL CALCULATED.3IONS-SCNC: 12 MMOL/L (ref 5–15)
BASOPHILS # BLD AUTO: 0.1 10*3/MM3 (ref 0–0.2)
BASOPHILS NFR BLD AUTO: 0.4 % (ref 0–1.5)
BUN SERPL-MCNC: 12 MG/DL (ref 8–23)
BUN SERPL-MCNC: 13 MG/DL (ref 8–23)
BUN/CREAT SERPL: 14.4 (ref 7–25)
BUN/CREAT SERPL: 16.2 (ref 7–25)
CALCIUM SPEC-SCNC: 9.1 MG/DL (ref 8.6–10.5)
CALCIUM SPEC-SCNC: 9.3 MG/DL (ref 8.6–10.5)
CHLORIDE SERPL-SCNC: 87 MMOL/L (ref 98–107)
CHLORIDE SERPL-SCNC: 92 MMOL/L (ref 98–107)
CO2 SERPL-SCNC: 22 MMOL/L (ref 22–29)
CO2 SERPL-SCNC: 23 MMOL/L (ref 22–29)
CORTIS SERPL-MCNC: 18.48 MCG/DL
CREAT SERPL-MCNC: 0.74 MG/DL (ref 0.76–1.27)
CREAT SERPL-MCNC: 0.9 MG/DL (ref 0.76–1.27)
DEPRECATED RDW RBC AUTO: 46.4 FL (ref 37–54)
EOSINOPHIL # BLD AUTO: 0 10*3/MM3 (ref 0–0.4)
EOSINOPHIL NFR BLD AUTO: 0 % (ref 0.3–6.2)
ERYTHROCYTE [DISTWIDTH] IN BLOOD BY AUTOMATED COUNT: 16 % (ref 12.3–15.4)
GFR SERPL CREATININE-BSD FRML MDRD: 107 ML/MIN/1.73
GFR SERPL CREATININE-BSD FRML MDRD: 85 ML/MIN/1.73
GLUCOSE BLDC GLUCOMTR-MCNC: 103 MG/DL (ref 70–105)
GLUCOSE BLDC GLUCOMTR-MCNC: 108 MG/DL (ref 70–105)
GLUCOSE BLDC GLUCOMTR-MCNC: 121 MG/DL (ref 70–105)
GLUCOSE BLDC GLUCOMTR-MCNC: 124 MG/DL (ref 70–105)
GLUCOSE SERPL-MCNC: 110 MG/DL (ref 65–99)
GLUCOSE SERPL-MCNC: 92 MG/DL (ref 65–99)
HBA1C MFR BLD: 5.1 % (ref 3.5–5.6)
HCT VFR BLD AUTO: 32.7 % (ref 37.5–51)
HGB BLD-MCNC: 11.2 G/DL (ref 13–17.7)
LYMPHOCYTES # BLD AUTO: 0.8 10*3/MM3 (ref 0.7–3.1)
LYMPHOCYTES NFR BLD AUTO: 5.2 % (ref 19.6–45.3)
MAGNESIUM SERPL-MCNC: 1.5 MG/DL (ref 1.6–2.4)
MAGNESIUM SERPL-MCNC: 1.7 MG/DL (ref 1.6–2.4)
MCH RBC QN AUTO: 28.3 PG (ref 26.6–33)
MCHC RBC AUTO-ENTMCNC: 34.1 G/DL (ref 31.5–35.7)
MCV RBC AUTO: 82.8 FL (ref 79–97)
MONOCYTES # BLD AUTO: 2 10*3/MM3 (ref 0.1–0.9)
MONOCYTES NFR BLD AUTO: 12.5 % (ref 5–12)
NEUTROPHILS NFR BLD AUTO: 13.3 10*3/MM3 (ref 1.7–7)
NEUTROPHILS NFR BLD AUTO: 81.9 % (ref 42.7–76)
NRBC BLD AUTO-RTO: 0.1 /100 WBC (ref 0–0.2)
NT-PROBNP SERPL-MCNC: 403.7 PG/ML (ref 0–900)
OSMOLALITY SERPL: 261 MOSM/KG (ref 280–301)
OSMOLALITY UR: 291 MOSM/KG (ref 300–800)
OSMOLALITY UR: 448 MOSM/KG (ref 300–800)
PHOSPHATE SERPL-MCNC: 2.2 MG/DL (ref 2.5–4.5)
PLATELET # BLD AUTO: 185 10*3/MM3 (ref 140–450)
PMV BLD AUTO: 8.9 FL (ref 6–12)
POTASSIUM SERPL-SCNC: 3.4 MMOL/L (ref 3.5–5.2)
POTASSIUM SERPL-SCNC: 3.6 MMOL/L (ref 3.5–5.2)
POTASSIUM UR-SCNC: 23.8 MMOL/L
RBC # BLD AUTO: 3.95 10*6/MM3 (ref 4.14–5.8)
SODIUM SERPL-SCNC: 122 MMOL/L (ref 136–145)
SODIUM SERPL-SCNC: 126 MMOL/L (ref 136–145)
SODIUM UR-SCNC: 32 MMOL/L
SODIUM UR-SCNC: 35 MMOL/L
TSH SERPL DL<=0.05 MIU/L-ACNC: 1.02 UIU/ML (ref 0.27–4.2)
URATE SERPL-MCNC: 2.1 MG/DL (ref 3.4–7)
VIT B12 BLD-MCNC: 831 PG/ML (ref 211–946)
WBC NRBC COR # BLD: 16.3 10*3/MM3 (ref 3.4–10.8)

## 2022-01-28 PROCEDURE — 25010000002 CEFTRIAXONE PER 250 MG: Performed by: NURSE PRACTITIONER

## 2022-01-28 PROCEDURE — 99222 1ST HOSP IP/OBS MODERATE 55: CPT | Performed by: NURSE PRACTITIONER

## 2022-01-28 PROCEDURE — 83935 ASSAY OF URINE OSMOLALITY: CPT | Performed by: INTERNAL MEDICINE

## 2022-01-28 PROCEDURE — 80048 BASIC METABOLIC PNL TOTAL CA: CPT | Performed by: INTERNAL MEDICINE

## 2022-01-28 PROCEDURE — 25010000002 MAGNESIUM SULFATE 2 GM/50ML SOLUTION: Performed by: NURSE PRACTITIONER

## 2022-01-28 PROCEDURE — 82962 GLUCOSE BLOOD TEST: CPT

## 2022-01-28 PROCEDURE — 84443 ASSAY THYROID STIM HORMONE: CPT | Performed by: INTERNAL MEDICINE

## 2022-01-28 PROCEDURE — 83735 ASSAY OF MAGNESIUM: CPT | Performed by: INTERNAL MEDICINE

## 2022-01-28 PROCEDURE — 82533 TOTAL CORTISOL: CPT | Performed by: INTERNAL MEDICINE

## 2022-01-28 PROCEDURE — 70551 MRI BRAIN STEM W/O DYE: CPT

## 2022-01-28 PROCEDURE — 84100 ASSAY OF PHOSPHORUS: CPT | Performed by: INTERNAL MEDICINE

## 2022-01-28 PROCEDURE — 93306 TTE W/DOPPLER COMPLETE: CPT

## 2022-01-28 PROCEDURE — 84550 ASSAY OF BLOOD/URIC ACID: CPT | Performed by: INTERNAL MEDICINE

## 2022-01-28 PROCEDURE — 97161 PT EVAL LOW COMPLEX 20 MIN: CPT

## 2022-01-28 PROCEDURE — 97166 OT EVAL MOD COMPLEX 45 MIN: CPT

## 2022-01-28 PROCEDURE — 84300 ASSAY OF URINE SODIUM: CPT | Performed by: INTERNAL MEDICINE

## 2022-01-28 PROCEDURE — 99233 SBSQ HOSP IP/OBS HIGH 50: CPT | Performed by: INTERNAL MEDICINE

## 2022-01-28 PROCEDURE — 83930 ASSAY OF BLOOD OSMOLALITY: CPT | Performed by: INTERNAL MEDICINE

## 2022-01-28 PROCEDURE — 93306 TTE W/DOPPLER COMPLETE: CPT | Performed by: INTERNAL MEDICINE

## 2022-01-28 RX ORDER — CLOPIDOGREL BISULFATE 75 MG/1
75 TABLET ORAL DAILY
Status: DISCONTINUED | OUTPATIENT
Start: 2022-01-28 | End: 2022-01-29 | Stop reason: HOSPADM

## 2022-01-28 RX ORDER — PREGABALIN 75 MG/1
150 CAPSULE ORAL EVERY 8 HOURS SCHEDULED
Status: DISCONTINUED | OUTPATIENT
Start: 2022-01-28 | End: 2022-01-29 | Stop reason: HOSPADM

## 2022-01-28 RX ORDER — PANTOPRAZOLE SODIUM 40 MG/1
40 TABLET, DELAYED RELEASE ORAL DAILY
Status: DISCONTINUED | OUTPATIENT
Start: 2022-01-28 | End: 2022-01-29 | Stop reason: HOSPADM

## 2022-01-28 RX ORDER — METOPROLOL SUCCINATE 25 MG/1
25 TABLET, EXTENDED RELEASE ORAL DAILY
Status: DISCONTINUED | OUTPATIENT
Start: 2022-01-28 | End: 2022-01-29 | Stop reason: HOSPADM

## 2022-01-28 RX ORDER — ATORVASTATIN CALCIUM 40 MG/1
40 TABLET, FILM COATED ORAL NIGHTLY
Status: DISCONTINUED | OUTPATIENT
Start: 2022-01-29 | End: 2022-01-29 | Stop reason: HOSPADM

## 2022-01-28 RX ORDER — NITROGLYCERIN 0.4 MG/1
0.4 TABLET SUBLINGUAL
COMMUNITY

## 2022-01-28 RX ORDER — RISPERIDONE 0.25 MG/1
0.25 TABLET ORAL 2 TIMES DAILY
Status: DISCONTINUED | OUTPATIENT
Start: 2022-01-28 | End: 2022-01-29 | Stop reason: HOSPADM

## 2022-01-28 RX ORDER — OXCARBAZEPINE 150 MG/1
300 TABLET, FILM COATED ORAL 3 TIMES DAILY
Status: DISCONTINUED | OUTPATIENT
Start: 2022-01-28 | End: 2022-01-29 | Stop reason: HOSPADM

## 2022-01-28 RX ORDER — OXCARBAZEPINE 150 MG/1
150 TABLET, FILM COATED ORAL NIGHTLY
Status: DISCONTINUED | OUTPATIENT
Start: 2022-01-28 | End: 2022-01-29 | Stop reason: HOSPADM

## 2022-01-28 RX ORDER — SODIUM CHLORIDE 1000 MG
1 TABLET, SOLUBLE MISCELLANEOUS 3 TIMES DAILY
Status: DISCONTINUED | OUTPATIENT
Start: 2022-01-28 | End: 2022-01-29 | Stop reason: HOSPADM

## 2022-01-28 RX ORDER — DIPHENHYDRAMINE HCL 25 MG
25 CAPSULE ORAL EVERY 8 HOURS PRN
Status: DISCONTINUED | OUTPATIENT
Start: 2022-01-28 | End: 2022-01-29 | Stop reason: HOSPADM

## 2022-01-28 RX ORDER — HYDROCODONE BITARTRATE AND ACETAMINOPHEN 10; 325 MG/1; MG/1
1 TABLET ORAL EVERY 6 HOURS PRN
Status: DISCONTINUED | OUTPATIENT
Start: 2022-01-28 | End: 2022-01-29 | Stop reason: HOSPADM

## 2022-01-28 RX ORDER — DICYCLOMINE HYDROCHLORIDE 10 MG/1
10 CAPSULE ORAL 3 TIMES DAILY
Status: DISCONTINUED | OUTPATIENT
Start: 2022-01-28 | End: 2022-01-29 | Stop reason: HOSPADM

## 2022-01-28 RX ORDER — AMLODIPINE BESYLATE 5 MG/1
5 TABLET ORAL DAILY
Status: DISCONTINUED | OUTPATIENT
Start: 2022-01-28 | End: 2022-01-29 | Stop reason: HOSPADM

## 2022-01-28 RX ORDER — HYDRALAZINE HYDROCHLORIDE 25 MG/1
50 TABLET, FILM COATED ORAL 3 TIMES DAILY
Status: DISCONTINUED | OUTPATIENT
Start: 2022-01-28 | End: 2022-01-29 | Stop reason: HOSPADM

## 2022-01-28 RX ORDER — NITROGLYCERIN 0.4 MG/1
0.4 TABLET SUBLINGUAL
Status: DISCONTINUED | OUTPATIENT
Start: 2022-01-28 | End: 2022-01-28 | Stop reason: SDUPTHER

## 2022-01-28 RX ORDER — ATORVASTATIN CALCIUM 40 MG/1
80 TABLET, FILM COATED ORAL NIGHTLY
Status: DISCONTINUED | OUTPATIENT
Start: 2022-01-28 | End: 2022-01-28

## 2022-01-28 RX ORDER — ATORVASTATIN CALCIUM 40 MG/1
40 TABLET, FILM COATED ORAL DAILY
Status: DISCONTINUED | OUTPATIENT
Start: 2022-01-28 | End: 2022-01-28

## 2022-01-28 RX ORDER — TRAZODONE HYDROCHLORIDE 100 MG/1
100 TABLET ORAL NIGHTLY
Status: DISCONTINUED | OUTPATIENT
Start: 2022-01-28 | End: 2022-01-29 | Stop reason: HOSPADM

## 2022-01-28 RX ORDER — LOPERAMIDE HYDROCHLORIDE 2 MG/1
2 CAPSULE ORAL EVERY 4 HOURS PRN
Status: DISCONTINUED | OUTPATIENT
Start: 2022-01-28 | End: 2022-01-29 | Stop reason: HOSPADM

## 2022-01-28 RX ORDER — DULOXETIN HYDROCHLORIDE 20 MG/1
20 CAPSULE, DELAYED RELEASE ORAL DAILY
Status: DISCONTINUED | OUTPATIENT
Start: 2022-01-28 | End: 2022-01-28

## 2022-01-28 RX ORDER — ATORVASTATIN CALCIUM 40 MG/1
80 TABLET, FILM COATED ORAL NIGHTLY
Status: DISCONTINUED | OUTPATIENT
Start: 2022-01-29 | End: 2022-01-28

## 2022-01-28 RX ORDER — SIMETHICONE 80 MG
80 TABLET,CHEWABLE ORAL EVERY 8 HOURS PRN
Status: DISCONTINUED | OUTPATIENT
Start: 2022-01-28 | End: 2022-01-29 | Stop reason: HOSPADM

## 2022-01-28 RX ORDER — PROMETHAZINE HYDROCHLORIDE 25 MG/1
25 TABLET ORAL EVERY 6 HOURS PRN
Status: DISCONTINUED | OUTPATIENT
Start: 2022-01-28 | End: 2022-01-29 | Stop reason: HOSPADM

## 2022-01-28 RX ORDER — CETIRIZINE HYDROCHLORIDE 10 MG/1
10 TABLET ORAL NIGHTLY
Status: DISCONTINUED | OUTPATIENT
Start: 2022-01-28 | End: 2022-01-29 | Stop reason: HOSPADM

## 2022-01-28 RX ADMIN — Medication 400 MG: at 14:29

## 2022-01-28 RX ADMIN — RISPERIDONE 0.25 MG: 0.25 TABLET ORAL at 20:40

## 2022-01-28 RX ADMIN — DULOXETINE HYDROCHLORIDE 20 MG: 20 CAPSULE, DELAYED RELEASE ORAL at 15:59

## 2022-01-28 RX ADMIN — OXCARBAZEPINE 300 MG: 150 TABLET, FILM COATED ORAL at 22:08

## 2022-01-28 RX ADMIN — MAGNESIUM SULFATE HEPTAHYDRATE 2 G: 40 INJECTION, SOLUTION INTRAVENOUS at 04:02

## 2022-01-28 RX ADMIN — PREGABALIN 150 MG: 75 CAPSULE ORAL at 21:00

## 2022-01-28 RX ADMIN — ASPIRIN 325 MG ORAL TABLET 325 MG: 325 PILL ORAL at 12:29

## 2022-01-28 RX ADMIN — AMLODIPINE BESYLATE 5 MG: 5 TABLET ORAL at 14:29

## 2022-01-28 RX ADMIN — Medication 10 ML: at 20:46

## 2022-01-28 RX ADMIN — Medication 10 ML: at 12:29

## 2022-01-28 RX ADMIN — OXCARBAZEPINE 300 MG: 150 TABLET, FILM COATED ORAL at 15:59

## 2022-01-28 RX ADMIN — LOPERAMIDE HYDROCHLORIDE 2 MG: 2 CAPSULE ORAL at 15:59

## 2022-01-28 RX ADMIN — SODIUM CHLORIDE TAB 1 GM 1 G: 1 TAB at 20:41

## 2022-01-28 RX ADMIN — POTASSIUM CHLORIDE 40 MEQ: 1500 TABLET, EXTENDED RELEASE ORAL at 15:59

## 2022-01-28 RX ADMIN — ATORVASTATIN CALCIUM 40 MG: 40 TABLET, FILM COATED ORAL at 12:29

## 2022-01-28 RX ADMIN — TRAZODONE HYDROCHLORIDE 100 MG: 100 TABLET ORAL at 20:41

## 2022-01-28 RX ADMIN — CEFTRIAXONE 1 G: 1 INJECTION, POWDER, FOR SOLUTION INTRAMUSCULAR; INTRAVENOUS at 20:39

## 2022-01-28 RX ADMIN — METOPROLOL SUCCINATE 25 MG: 25 TABLET, EXTENDED RELEASE ORAL at 14:29

## 2022-01-28 RX ADMIN — DICYCLOMINE HYDROCHLORIDE 10 MG: 10 CAPSULE ORAL at 15:59

## 2022-01-28 RX ADMIN — PANTOPRAZOLE SODIUM 40 MG: 40 TABLET, DELAYED RELEASE ORAL at 14:29

## 2022-01-28 RX ADMIN — PREGABALIN 150 MG: 75 CAPSULE ORAL at 14:29

## 2022-01-28 RX ADMIN — SODIUM CHLORIDE TAB 1 GM 1 G: 1 TAB at 16:00

## 2022-01-28 RX ADMIN — OXCARBAZEPINE 150 MG: 150 TABLET, FILM COATED ORAL at 20:41

## 2022-01-28 RX ADMIN — CLOPIDOGREL BISULFATE 75 MG: 75 TABLET, FILM COATED ORAL at 14:29

## 2022-01-28 RX ADMIN — DICYCLOMINE HYDROCHLORIDE 10 MG: 10 CAPSULE ORAL at 20:41

## 2022-01-28 RX ADMIN — SODIUM CHLORIDE 250 ML: 9 INJECTION, SOLUTION INTRAVENOUS at 04:26

## 2022-01-28 RX ADMIN — CETIRIZINE HYDROCHLORIDE 10 MG: 10 TABLET, FILM COATED ORAL at 20:40

## 2022-01-28 RX ADMIN — HYDRALAZINE HYDROCHLORIDE 50 MG: 25 TABLET, FILM COATED ORAL at 16:00

## 2022-01-29 VITALS
RESPIRATION RATE: 18 BRPM | HEART RATE: 68 BPM | OXYGEN SATURATION: 95 % | WEIGHT: 185.19 LBS | DIASTOLIC BLOOD PRESSURE: 50 MMHG | HEIGHT: 68 IN | SYSTOLIC BLOOD PRESSURE: 111 MMHG | BODY MASS INDEX: 28.07 KG/M2 | TEMPERATURE: 99.1 F

## 2022-01-29 LAB
ALBUMIN SERPL-MCNC: 3.4 G/DL (ref 3.5–5.2)
ANION GAP SERPL CALCULATED.3IONS-SCNC: 7 MMOL/L (ref 5–15)
BACTERIA SPEC AEROBE CULT: ABNORMAL
BASOPHILS # BLD AUTO: 0 10*3/MM3 (ref 0–0.2)
BASOPHILS NFR BLD AUTO: 0.4 % (ref 0–1.5)
BUN SERPL-MCNC: 14 MG/DL (ref 8–23)
BUN/CREAT SERPL: 17.9 (ref 7–25)
CALCIUM SPEC-SCNC: 8.9 MG/DL (ref 8.6–10.5)
CHLORIDE SERPL-SCNC: 96 MMOL/L (ref 98–107)
CO2 SERPL-SCNC: 27 MMOL/L (ref 22–29)
CREAT SERPL-MCNC: 0.78 MG/DL (ref 0.76–1.27)
DEPRECATED RDW RBC AUTO: 46.8 FL (ref 37–54)
EOSINOPHIL # BLD AUTO: 0.1 10*3/MM3 (ref 0–0.4)
EOSINOPHIL NFR BLD AUTO: 0.9 % (ref 0.3–6.2)
ERYTHROCYTE [DISTWIDTH] IN BLOOD BY AUTOMATED COUNT: 16.1 % (ref 12.3–15.4)
GFR SERPL CREATININE-BSD FRML MDRD: 101 ML/MIN/1.73
GLUCOSE BLDC GLUCOMTR-MCNC: 131 MG/DL (ref 70–105)
GLUCOSE BLDC GLUCOMTR-MCNC: 134 MG/DL (ref 70–105)
GLUCOSE BLDC GLUCOMTR-MCNC: 161 MG/DL (ref 70–105)
GLUCOSE BLDC GLUCOMTR-MCNC: 165 MG/DL (ref 70–105)
GLUCOSE BLDC GLUCOMTR-MCNC: 167 MG/DL (ref 70–105)
GLUCOSE SERPL-MCNC: 105 MG/DL (ref 65–99)
HCT VFR BLD AUTO: 28.8 % (ref 37.5–51)
HGB BLD-MCNC: 10 G/DL (ref 13–17.7)
LYMPHOCYTES # BLD AUTO: 0.7 10*3/MM3 (ref 0.7–3.1)
LYMPHOCYTES NFR BLD AUTO: 9.8 % (ref 19.6–45.3)
MAGNESIUM SERPL-MCNC: 1.5 MG/DL (ref 1.6–2.4)
MCH RBC QN AUTO: 28.8 PG (ref 26.6–33)
MCHC RBC AUTO-ENTMCNC: 34.6 G/DL (ref 31.5–35.7)
MCV RBC AUTO: 83.3 FL (ref 79–97)
MONOCYTES # BLD AUTO: 1.1 10*3/MM3 (ref 0.1–0.9)
MONOCYTES NFR BLD AUTO: 15.9 % (ref 5–12)
NEUTROPHILS NFR BLD AUTO: 4.9 10*3/MM3 (ref 1.7–7)
NEUTROPHILS NFR BLD AUTO: 73 % (ref 42.7–76)
NRBC BLD AUTO-RTO: 0 /100 WBC (ref 0–0.2)
PHOSPHATE SERPL-MCNC: 1.2 MG/DL (ref 2.5–4.5)
PLATELET # BLD AUTO: 178 10*3/MM3 (ref 140–450)
PMV BLD AUTO: 9 FL (ref 6–12)
POTASSIUM SERPL-SCNC: 4.1 MMOL/L (ref 3.5–5.2)
RBC # BLD AUTO: 3.46 10*6/MM3 (ref 4.14–5.8)
SODIUM SERPL-SCNC: 130 MMOL/L (ref 136–145)
URATE SERPL-MCNC: 2.2 MG/DL (ref 3.4–7)
WBC NRBC COR # BLD: 6.7 10*3/MM3 (ref 3.4–10.8)

## 2022-01-29 PROCEDURE — 85025 COMPLETE CBC W/AUTO DIFF WBC: CPT | Performed by: NURSE PRACTITIONER

## 2022-01-29 PROCEDURE — 63710000001 INSULIN LISPRO (HUMAN) PER 5 UNITS: Performed by: NURSE PRACTITIONER

## 2022-01-29 PROCEDURE — 63710000001 INSULIN LISPRO (HUMAN) PER 5 UNITS: Performed by: INTERNAL MEDICINE

## 2022-01-29 PROCEDURE — 36415 COLL VENOUS BLD VENIPUNCTURE: CPT | Performed by: NURSE PRACTITIONER

## 2022-01-29 PROCEDURE — 99239 HOSP IP/OBS DSCHRG MGMT >30: CPT | Performed by: INTERNAL MEDICINE

## 2022-01-29 PROCEDURE — 84550 ASSAY OF BLOOD/URIC ACID: CPT | Performed by: INTERNAL MEDICINE

## 2022-01-29 PROCEDURE — 80069 RENAL FUNCTION PANEL: CPT | Performed by: INTERNAL MEDICINE

## 2022-01-29 PROCEDURE — 82962 GLUCOSE BLOOD TEST: CPT

## 2022-01-29 PROCEDURE — 83735 ASSAY OF MAGNESIUM: CPT | Performed by: NURSE PRACTITIONER

## 2022-01-29 RX ORDER — CEPHALEXIN 500 MG/1
500 CAPSULE ORAL 2 TIMES DAILY
Qty: 10 CAPSULE | Refills: 0 | Status: SHIPPED | OUTPATIENT
Start: 2022-01-29 | End: 2022-01-29 | Stop reason: HOSPADM

## 2022-01-29 RX ORDER — SOD PHOS DI, MONO/K PHOS MONO 250 MG
2 TABLET ORAL 3 TIMES DAILY
Qty: 10 TABLET | Refills: 0 | Status: SHIPPED | OUTPATIENT
Start: 2022-01-29 | End: 2022-01-31

## 2022-01-29 RX ORDER — INSULIN LISPRO 100 [IU]/ML
0-9 INJECTION, SOLUTION INTRAVENOUS; SUBCUTANEOUS AS NEEDED
Status: DISCONTINUED | OUTPATIENT
Start: 2022-01-29 | End: 2022-01-29 | Stop reason: HOSPADM

## 2022-01-29 RX ORDER — CEFDINIR 300 MG/1
300 CAPSULE ORAL 2 TIMES DAILY
Qty: 28 CAPSULE | Refills: 0 | Status: SHIPPED | OUTPATIENT
Start: 2022-01-29 | End: 2022-02-12

## 2022-01-29 RX ORDER — MAGNESIUM SULFATE HEPTAHYDRATE 40 MG/ML
2 INJECTION, SOLUTION INTRAVENOUS AS NEEDED
Status: DISCONTINUED | OUTPATIENT
Start: 2022-01-29 | End: 2022-01-29 | Stop reason: HOSPADM

## 2022-01-29 RX ORDER — SACCHAROMYCES BOULARDII 250 MG
250 CAPSULE ORAL 2 TIMES DAILY
Qty: 30 CAPSULE | Refills: 1 | Status: SHIPPED | OUTPATIENT
Start: 2022-01-29

## 2022-01-29 RX ORDER — MAGNESIUM SULFATE HEPTAHYDRATE 40 MG/ML
4 INJECTION, SOLUTION INTRAVENOUS AS NEEDED
Status: DISCONTINUED | OUTPATIENT
Start: 2022-01-29 | End: 2022-01-29 | Stop reason: HOSPADM

## 2022-01-29 RX ORDER — SOD PHOS DI, MONO/K PHOS MONO 250 MG
500 TABLET ORAL 3 TIMES DAILY
Status: DISCONTINUED | OUTPATIENT
Start: 2022-01-29 | End: 2022-01-29 | Stop reason: HOSPADM

## 2022-01-29 RX ORDER — HYDROCODONE BITARTRATE AND ACETAMINOPHEN 10; 325 MG/1; MG/1
1 TABLET ORAL EVERY 6 HOURS PRN
Qty: 4 TABLET | Refills: 0 | Status: SHIPPED | OUTPATIENT
Start: 2022-01-29

## 2022-01-29 RX ORDER — CALCIUM GLUCONATE 20 MG/ML
1 INJECTION, SOLUTION INTRAVENOUS AS NEEDED
Status: DISCONTINUED | OUTPATIENT
Start: 2022-01-29 | End: 2022-01-29 | Stop reason: HOSPADM

## 2022-01-29 RX ORDER — INSULIN LISPRO 100 [IU]/ML
0-9 INJECTION, SOLUTION INTRAVENOUS; SUBCUTANEOUS
Status: DISCONTINUED | OUTPATIENT
Start: 2022-01-29 | End: 2022-01-29 | Stop reason: HOSPADM

## 2022-01-29 RX ORDER — CALCIUM GLUCONATE 20 MG/ML
2 INJECTION, SOLUTION INTRAVENOUS AS NEEDED
Status: DISCONTINUED | OUTPATIENT
Start: 2022-01-29 | End: 2022-01-29 | Stop reason: HOSPADM

## 2022-01-29 RX ADMIN — ASPIRIN 325 MG ORAL TABLET 325 MG: 325 PILL ORAL at 09:25

## 2022-01-29 RX ADMIN — INSULIN LISPRO 2 UNITS: 100 INJECTION, SOLUTION INTRAVENOUS; SUBCUTANEOUS at 00:40

## 2022-01-29 RX ADMIN — SODIUM CHLORIDE TAB 1 GM 1 G: 1 TAB at 09:25

## 2022-01-29 RX ADMIN — SODIUM PHOSPHATE, DIBASIC, ANHYDROUS, POTASSIUM PHOSPHATE, MONOBASIC, AND SODIUM PHOSPHATE, MONOBASIC, MONOHYDRATE 2 TABLET: 852; 155; 130 TABLET, COATED ORAL at 15:30

## 2022-01-29 RX ADMIN — OXCARBAZEPINE 300 MG: 150 TABLET, FILM COATED ORAL at 15:30

## 2022-01-29 RX ADMIN — SODIUM PHOSPHATE, DIBASIC, ANHYDROUS, POTASSIUM PHOSPHATE, MONOBASIC, AND SODIUM PHOSPHATE, MONOBASIC, MONOHYDRATE 2 TABLET: 852; 155; 130 TABLET, COATED ORAL at 09:25

## 2022-01-29 RX ADMIN — AMLODIPINE BESYLATE 5 MG: 5 TABLET ORAL at 09:25

## 2022-01-29 RX ADMIN — RISPERIDONE 0.25 MG: 0.25 TABLET ORAL at 09:25

## 2022-01-29 RX ADMIN — DICYCLOMINE HYDROCHLORIDE 10 MG: 10 CAPSULE ORAL at 15:30

## 2022-01-29 RX ADMIN — METOPROLOL SUCCINATE 25 MG: 25 TABLET, EXTENDED RELEASE ORAL at 09:24

## 2022-01-29 RX ADMIN — PANTOPRAZOLE SODIUM 40 MG: 40 TABLET, DELAYED RELEASE ORAL at 09:25

## 2022-01-29 RX ADMIN — SODIUM CHLORIDE TAB 1 GM 1 G: 1 TAB at 15:30

## 2022-01-29 RX ADMIN — PREGABALIN 150 MG: 75 CAPSULE ORAL at 15:30

## 2022-01-29 RX ADMIN — POTASSIUM, SODIUM PHOSPHATES 280 MG-160 MG-250 MG ORAL POWDER PACKET 2 PACKET: POWDER IN PACKET at 06:35

## 2022-01-29 RX ADMIN — Medication 400 MG: at 09:25

## 2022-01-29 RX ADMIN — PREGABALIN 150 MG: 75 CAPSULE ORAL at 06:35

## 2022-01-29 RX ADMIN — CLOPIDOGREL BISULFATE 75 MG: 75 TABLET, FILM COATED ORAL at 09:25

## 2022-01-29 RX ADMIN — Medication 10 ML: at 09:24

## 2022-01-29 RX ADMIN — OXCARBAZEPINE 300 MG: 150 TABLET, FILM COATED ORAL at 09:25

## 2022-01-29 RX ADMIN — DICYCLOMINE HYDROCHLORIDE 10 MG: 10 CAPSULE ORAL at 09:25

## 2022-01-29 RX ADMIN — INSULIN LISPRO 2 UNITS: 100 INJECTION, SOLUTION INTRAVENOUS; SUBCUTANEOUS at 17:32

## 2022-01-30 LAB — QT INTERVAL: 335 MS

## 2022-02-01 LAB
BACTERIA SPEC AEROBE CULT: NORMAL
BACTERIA SPEC AEROBE CULT: NORMAL
BH CV ECHO MEAS - % IVS THICK: 44.7 %
BH CV ECHO MEAS - % LVPW THICK: 39 %
BH CV ECHO MEAS - ACS: 2.1 CM
BH CV ECHO MEAS - AO MAX PG (FULL): 5.9 MMHG
BH CV ECHO MEAS - AO MAX PG: 12.1 MMHG
BH CV ECHO MEAS - AO MEAN PG (FULL): 3 MMHG
BH CV ECHO MEAS - AO MEAN PG: 6.1 MMHG
BH CV ECHO MEAS - AO ROOT AREA (BSA CORRECTED): 1.5
BH CV ECHO MEAS - AO ROOT AREA: 6.7 CM^2
BH CV ECHO MEAS - AO ROOT DIAM: 2.9 CM
BH CV ECHO MEAS - AO V2 MAX: 174.1 CM/SEC
BH CV ECHO MEAS - AO V2 MEAN: 116.9 CM/SEC
BH CV ECHO MEAS - AO V2 VTI: 33.6 CM
BH CV ECHO MEAS - AVA(I,A): 2.4 CM^2
BH CV ECHO MEAS - AVA(I,D): 2.4 CM^2
BH CV ECHO MEAS - AVA(V,A): 2.4 CM^2
BH CV ECHO MEAS - AVA(V,D): 2.4 CM^2
BH CV ECHO MEAS - BSA(HAYCOCK): 2 M^2
BH CV ECHO MEAS - BSA: 2 M^2
BH CV ECHO MEAS - BZI_BMI: 28.3 KILOGRAMS/M^2
BH CV ECHO MEAS - BZI_METRIC_HEIGHT: 172.7 CM
BH CV ECHO MEAS - BZI_METRIC_WEIGHT: 84.4 KG
BH CV ECHO MEAS - EDV(CUBED): 66.7 ML
BH CV ECHO MEAS - EDV(MOD-SP4): 58.6 ML
BH CV ECHO MEAS - EDV(TEICH): 72.4 ML
BH CV ECHO MEAS - EF(CUBED): 64.8 %
BH CV ECHO MEAS - EF(MOD-BP): 59 %
BH CV ECHO MEAS - EF(MOD-SP4): 59.3 %
BH CV ECHO MEAS - EF(TEICH): 56.9 %
BH CV ECHO MEAS - ESV(CUBED): 23.5 ML
BH CV ECHO MEAS - ESV(MOD-SP4): 23.9 ML
BH CV ECHO MEAS - ESV(TEICH): 31.2 ML
BH CV ECHO MEAS - FS: 29.4 %
BH CV ECHO MEAS - IVS/LVPW: 0.94
BH CV ECHO MEAS - IVSD: 1.1 CM
BH CV ECHO MEAS - IVSS: 1.7 CM
BH CV ECHO MEAS - LA DIMENSION(2D): 4.1 CM
BH CV ECHO MEAS - LV DIASTOLIC VOL/BSA (35-75): 29.6 ML/M^2
BH CV ECHO MEAS - LV MASS(C)D: 165.5 GRAMS
BH CV ECHO MEAS - LV MASS(C)DI: 83.5 GRAMS/M^2
BH CV ECHO MEAS - LV MASS(C)S: 181 GRAMS
BH CV ECHO MEAS - LV MASS(C)SI: 91.3 GRAMS/M^2
BH CV ECHO MEAS - LV MAX PG: 6.2 MMHG
BH CV ECHO MEAS - LV MEAN PG: 3.2 MMHG
BH CV ECHO MEAS - LV SYSTOLIC VOL/BSA (12-30): 12 ML/M^2
BH CV ECHO MEAS - LV V1 MAX: 124.6 CM/SEC
BH CV ECHO MEAS - LV V1 MEAN: 83.4 CM/SEC
BH CV ECHO MEAS - LV V1 VTI: 23.9 CM
BH CV ECHO MEAS - LVIDD: 4.1 CM
BH CV ECHO MEAS - LVIDS: 2.9 CM
BH CV ECHO MEAS - LVOT AREA: 3.3 CM^2
BH CV ECHO MEAS - LVOT DIAM: 2.1 CM
BH CV ECHO MEAS - LVPWD: 1.2 CM
BH CV ECHO MEAS - LVPWS: 1.7 CM
BH CV ECHO MEAS - MV A MAX VEL: 101 CM/SEC
BH CV ECHO MEAS - MV DEC SLOPE: 559.9 CM/SEC^2
BH CV ECHO MEAS - MV DEC TIME: 0.2 SEC
BH CV ECHO MEAS - MV E MAX VEL: 112 CM/SEC
BH CV ECHO MEAS - MV E/A: 1.1
BH CV ECHO MEAS - MV MAX PG: 5.7 MMHG
BH CV ECHO MEAS - MV MEAN PG: 2.9 MMHG
BH CV ECHO MEAS - MV V2 MAX: 119.2 CM/SEC
BH CV ECHO MEAS - MV V2 MEAN: 81.6 CM/SEC
BH CV ECHO MEAS - MV V2 VTI: 32.8 CM
BH CV ECHO MEAS - MVA(VTI): 2.4 CM^2
BH CV ECHO MEAS - PA ACC TIME: 0.13 SEC
BH CV ECHO MEAS - PA MAX PG (FULL): 0.7 MMHG
BH CV ECHO MEAS - PA MAX PG: 5.3 MMHG
BH CV ECHO MEAS - PA MEAN PG (FULL): 0.61 MMHG
BH CV ECHO MEAS - PA MEAN PG: 3.2 MMHG
BH CV ECHO MEAS - PA PR(ACCEL): 18.3 MMHG
BH CV ECHO MEAS - PA V2 MAX: 114.6 CM/SEC
BH CV ECHO MEAS - PA V2 MEAN: 86.5 CM/SEC
BH CV ECHO MEAS - PA V2 VTI: 21.2 CM
BH CV ECHO MEAS - RV MAX PG: 4.5 MMHG
BH CV ECHO MEAS - RV MEAN PG: 2.6 MMHG
BH CV ECHO MEAS - RV V1 MAX: 106.6 CM/SEC
BH CV ECHO MEAS - RV V1 MEAN: 76.7 CM/SEC
BH CV ECHO MEAS - RV V1 VTI: 21.2 CM
BH CV ECHO MEAS - RVDD: 2.7 CM
BH CV ECHO MEAS - SI(AO): 114 ML/M^2
BH CV ECHO MEAS - SI(CUBED): 21.8 ML/M^2
BH CV ECHO MEAS - SI(LVOT): 40 ML/M^2
BH CV ECHO MEAS - SI(MOD-SP4): 17.5 ML/M^2
BH CV ECHO MEAS - SI(TEICH): 20.8 ML/M^2
BH CV ECHO MEAS - SV(AO): 225.8 ML
BH CV ECHO MEAS - SV(CUBED): 43.3 ML
BH CV ECHO MEAS - SV(LVOT): 79.2 ML
BH CV ECHO MEAS - SV(MOD-SP4): 34.7 ML
BH CV ECHO MEAS - SV(TEICH): 41.2 ML
MAXIMAL PREDICTED HEART RATE: 157 BPM
STRESS TARGET HR: 133 BPM

## 2022-02-09 LAB — FUNGUS WND CULT: NORMAL

## 2022-02-23 LAB
MYCOBACTERIUM SPEC CULT: NORMAL
NIGHT BLUE STAIN TISS: NORMAL

## 2022-05-24 NOTE — PROGRESS NOTES
Subjective:     Encounter Date:09/09/2021      Patient ID: Emery Nesbitt is a 61 y.o. male.    Chief Complaint: Peripheral vascular disease  History of Present Illness 61-year-old white male with history of coronary disease diabetes hypertension hyperlipidemia peripheral vascular disease presents to my office for follow-up.  Patient is currently stable without any symptoms of chest pain or shortness of breath at rest on exertion.  No complaints any PND orthopnea.  No palpitation dizziness syncope or swelling of the feet but is been taking his medicine regularly.  He does not smoke.  He is currently in a nursing home and is in a wheelchair most of the time.  The following portions of the patient's history were reviewed and updated as appropriate: allergies, current medications, past family history, past medical history, past social history, past surgical history and problem list.  Past Medical History:   Diagnosis Date   • Anxiety    • Bipolar affective (CMS/[facility])    • Bipolar disorder (CMS/HCC) 07/14/2020   • Cellulitis 07/14/2020   • Compression fracture of lumbar vertebra (CMS/[facility])    • Constipation    • Coronary artery disease    • CVA (cerebral vascular accident) (CMS/HCC)    • Diabetes mellitus (CMS/HCC)    • Diarrhea    • GERD (gastroesophageal reflux disease)    • Hyperlipidemia    • Hypertension    • Insomnia    • Low back pain    • Major depression    • Nausea    • Neuropathy    • Open wound     toes   • Orthostatic hypotension    • Peripheral vascular disease (CMS/HCC)    • Schizophrenia (CMS/[facility])    • Weakness      Past Surgical History:   Procedure Laterality Date   • AMPUTATION DIGIT Bilateral 3/3/2021    Procedure: AMPUTATION 2ND TOE RIGHT SIDE/ PARTIAL AMPUTATION 1ST TOE ON LEFT;  Surgeon: Will Garcia DPM;  Location: [facility];  Service: Podiatry;  Laterality: Bilateral;   • AMPUTATION DIGIT Right 8/13/2021    Procedure: AMPUTATION OF THIRD TOE;  Surgeon: Will Garcia DPM;   Location: [facility] MAIN OR;  Service: Podiatry;  Laterality: Right;   • AMPUTATION FOOT Right 7/17/2020    Procedure: RIGHT GREAT TOE AMPUTATION;  Surgeon: Will Garcia DPM;  Location: [facility] MAIN OR;  Service: Podiatry;  Laterality: Right;   • CARDIAC CATHETERIZATION     • CORONARY ANGIOPLASTY       There were no vitals taken for this visit.  Family History   Adopted: Yes       Current Outpatient Medications:   •  amLODIPine (NORVASC) 5 MG tablet, Take 5 mg by mouth Daily. Take dos, Disp: , Rfl:   •  atorvastatin (LIPITOR) 80 MG tablet, Take 80 mg by mouth Every Night., Disp: , Rfl:   •  cetirizine (zyrTEC) 10 MG tablet, Take 10 mg by mouth Every Night., Disp: , Rfl:   •  CloNIDine (CATAPRES) 0.2 MG tablet, Take 0.1 mg by mouth 3 (Three) Times a Day As Needed., Disp: , Rfl:   •  clopidogrel (PLAVIX) 75 MG tablet, Take 75 mg by mouth Daily. Per Jermaine at [facility] office to call [facility], Disp: , Rfl:   •  dicyclomine (BENTYL) 10 MG capsule, Take 10 mg by mouth Every 6 (Six) Hours As Needed., Disp: , Rfl:   •  diphenhydrAMINE (BENADRYL) 25 mg capsule, Take 25 mg by mouth Every 8 (Eight) Hours As Needed for Itching. Not dos, Disp: , Rfl:   •  docusate sodium (COLACE) 100 MG capsule, Take 100 mg by mouth 2 (Two) Times a Day., Disp: , Rfl:   •  doxycycline (VIBRAMYCIN) 100 MG capsule, Take 100 mg by mouth 2 (Two) Times a Day. Take preop, Disp: , Rfl:   •  DULoxetine (CYMBALTA) 20 MG capsule, Take 20 mg by mouth Daily. Take dos, Disp: , Rfl:   •  fenofibrate (TRICOR) 145 MG tablet, Take 145 mg by mouth Daily. Take dos, Disp: , Rfl:   •  hydrALAZINE (APRESOLINE) 50 MG tablet, Take 50 mg by mouth 2 (two) times a day. Take dos, Disp: , Rfl:   •  HYDROcodone-acetaminophen (Norco)  MG per tablet, Take 1 tablet by mouth Every 6 (Six) Hours As Needed for Moderate Pain . (Patient taking differently: Take 1 tablet by mouth Every 6 (Six) Hours As Needed for Moderate Pain . May take preop if needed), Disp: 4 tablet, Rfl:  0  •  loperamide (IMODIUM) 2 MG capsule, Take 2 mg by mouth Every 4 (Four) Hours As Needed for Diarrhea. dont take preop, Disp: , Rfl:   •  magnesium oxide (MAGNESIUM-OXIDE) 400 (241.3 Mg) MG tablet tablet, Take 400 mg by mouth Daily. Stop now for surgery, Disp: , Rfl:   •  metFORMIN (GLUCOPHAGE) 500 MG tablet, Take 500 mg by mouth 2 (Two) Times a Day With Meals. Last dose 8/11by 0900, Disp: , Rfl:   •  metoprolol succinate XL (TOPROL-XL) 25 MG 24 hr tablet, Take 25 mg by mouth Daily. Take dos, Disp: , Rfl:   •  Multiple Vitamins-Minerals (MULTIVITAMIN WITH MINERALS) tablet tablet, Take 1 tablet by mouth Daily., Disp: , Rfl:   •  nitroglycerin (NITROLINGUAL) 0.4 MG/SPRAY spray, NITROLINGUAL 0.4 MG/SPRAY SOLN, Disp: , Rfl:   •  OXcarbazepine (TRILEPTAL) 300 MG tablet, Take 150 mg by mouth Every Night., Disp: , Rfl:   •  OXcarbazepine (TRILEPTAL) 300 MG tablet, Take 300 mg by mouth 3 (Three) Times a Day. Take dos, Disp: , Rfl:   •  pantoprazole (PROTONIX) 40 MG EC tablet, Take 40 mg by mouth Daily. Take dos, Disp: , Rfl:   •  pregabalin (LYRICA) 150 MG capsule, Take 150 mg by mouth Every 8 (Eight) Hours. Take dos, Disp: , Rfl:   •  promethazine (PHENERGAN) 12.5 MG tablet, Take 25 mg by mouth Every 6 (Six) Hours As Needed for Nausea or Vomiting. May take preop if needed, Disp: , Rfl:   •  risperiDONE (risperDAL) 0.5 MG tablet, Take 0.5 mg by mouth 2 (Two) Times a Day. Take dos, Disp: , Rfl:   •  simethicone (MYLICON) 80 MG chewable tablet, Chew 80 mg Every 8 (Eight) Hours As Needed for Flatulence., Disp: , Rfl:   •  sodium chloride 1 g tablet, Take 1 g by mouth 3 (Three) Times a Day. Take preop, Disp: , Rfl:   •  traZODone (DESYREL) 50 MG tablet, Take 100 mg by mouth Every Night., Disp: , Rfl:   •  vitamin D3 125 MCG (5000 UT) capsule capsule, Take 5,000 Units by mouth Daily. Stop now for surgery, Disp: , Rfl:   No Known Allergies  Social History     Socioeconomic History   • Marital status: Single     Spouse name: Not  [FreeTextEntry3] : Focused skin exam performed \par The relevant portions of the exam were performed today\par \par AAOx3, NAD, well-appearing / pleasant\par Focused examination within normal limits with the exception of:\par - oval hyperpigmented patches on anterior neck  on file   • Number of children: Not on file   • Years of education: Not on file   • Highest education level: Not on file   Tobacco Use   • Smoking status: Former Smoker     Quit date: 2016     Years since quittin.6   • Smokeless tobacco: Never Used   Substance and Sexual Activity   • Alcohol use: Never   • Drug use: Never     Review of Systems   Constitutional: Negative for fever and malaise/fatigue.   HENT: Negative for congestion and hearing loss.    Eyes: Negative for double vision and visual disturbance.   Cardiovascular: Negative for chest pain, claudication, dyspnea on exertion, leg swelling and syncope.   Respiratory: Negative for cough and shortness of breath.    Endocrine: Negative for cold intolerance.   Skin: Negative for color change and rash.   Musculoskeletal: Negative for arthritis and joint pain.   Gastrointestinal: Negative for abdominal pain and heartburn.   Genitourinary: Negative for hematuria.   Neurological: Negative for excessive daytime sleepiness and dizziness.   Psychiatric/Behavioral: Negative for depression. The patient is not nervous/anxious.    All other systems reviewed and are negative.             Objective:     Constitutional:       Appearance: Well-developed.   Eyes:      General: No scleral icterus.     Conjunctiva/sclera: Conjunctivae normal.   HENT:      Head: Normocephalic and atraumatic.   Neck:      Vascular: No carotid bruit or JVD.   Pulmonary:      Effort: Pulmonary effort is normal.      Breath sounds: Normal breath sounds. No wheezing. No rales.   Cardiovascular:      Normal rate. Regular rhythm.   Pulses:     Intact distal pulses.   Abdominal:      General: Bowel sounds are normal.      Palpations: Abdomen is soft.   Musculoskeletal:      Cervical back: Normal range of motion and neck supple. Skin:     General: Skin is warm and dry.      Findings: No rash.   Neurological:      Mental Status: Alert.       Procedures    Lab Review:         MDM  1.  Coronary  disease  Patient has nonobstructive disease in the past with normal systolic function is currently stable on medications  2.  Hypertension  Patient blood pressure currently stable on medications  3.  Diabetes  Patient is currently on oral medicines and followed by the primary doctor  4.  Hyperlipidemia  Patient lipid levels are followed by primary doctor is on a statin  5.  Peripheral artery disease  Patient has significant peripheral artery disease and is on a wheelchair most of the time.    Patient's previous medical records, labs, and EKG were reviewed and discussed with the patient at today's visit.

## 2023-03-23 ENCOUNTER — OFFICE VISIT (OUTPATIENT)
Dept: CARDIOLOGY | Facility: CLINIC | Age: 63
End: 2023-03-23
Payer: MEDICARE

## 2023-03-23 VITALS
HEART RATE: 68 BPM | SYSTOLIC BLOOD PRESSURE: 134 MMHG | BODY MASS INDEX: 27.43 KG/M2 | WEIGHT: 181 LBS | HEIGHT: 68 IN | OXYGEN SATURATION: 98 % | DIASTOLIC BLOOD PRESSURE: 71 MMHG

## 2023-03-23 DIAGNOSIS — E11.9 TYPE 2 DIABETES MELLITUS WITHOUT COMPLICATION, WITHOUT LONG-TERM CURRENT USE OF INSULIN: ICD-10-CM

## 2023-03-23 DIAGNOSIS — E78.00 PURE HYPERCHOLESTEROLEMIA: ICD-10-CM

## 2023-03-23 DIAGNOSIS — I10 ESSENTIAL HYPERTENSION: ICD-10-CM

## 2023-03-23 DIAGNOSIS — I25.118 CORONARY ARTERY DISEASE OF NATIVE ARTERY OF NATIVE HEART WITH STABLE ANGINA PECTORIS: ICD-10-CM

## 2023-03-23 DIAGNOSIS — I73.9 PERIPHERAL VASCULAR DISEASE: Primary | ICD-10-CM

## 2023-03-23 PROCEDURE — 1159F MED LIST DOCD IN RCRD: CPT | Performed by: INTERNAL MEDICINE

## 2023-03-23 PROCEDURE — 99214 OFFICE O/P EST MOD 30 MIN: CPT | Performed by: INTERNAL MEDICINE

## 2023-03-23 PROCEDURE — 1160F RVW MEDS BY RX/DR IN RCRD: CPT | Performed by: INTERNAL MEDICINE

## 2023-03-23 PROCEDURE — 3078F DIAST BP <80 MM HG: CPT | Performed by: INTERNAL MEDICINE

## 2023-03-23 PROCEDURE — 3075F SYST BP GE 130 - 139MM HG: CPT | Performed by: INTERNAL MEDICINE

## 2023-03-23 NOTE — PROGRESS NOTES
Subjective:     Encounter Date:03/23/2023      Patient ID: Emery Nesbitt is a 63 y.o. male.    Chief Complaint:  History of Present Illness 63-year-old white male with history of coronary disease hypertension hyperlipidemia diabetes and sleep apnea presents to my office for follow-up.  Patient is currently stable without any symptoms of chest pain or shortness of breath at rest.  No complains any PND orthopnea.  No palpitation dizziness syncope or swelling of the feet.  He always uses a wheelchair.  He is currently in a nursing home.  He is taking his medicines regularly.  Does not smoke    The following portions of the patient's history were reviewed and updated as appropriate: allergies, current medications, past family history, past medical history, past social history, past surgical history and problem list.  Past Medical History:   Diagnosis Date   • Anxiety    • Bipolar affective (HCC)    • Bipolar disorder (HCC) 07/14/2020   • Cellulitis 07/14/2020   • Compression fracture of lumbar vertebra ([facility])    • Constipation    • Coronary artery disease    • CVA (cerebral vascular accident) ([facility])    • Diabetes mellitus ([facility])    • Diarrhea    • GERD (gastroesophageal reflux disease)    • Hyperlipidemia    • Hypertension    • Insomnia    • Low back pain    • Major depression    • Nausea    • Neuropathy    • Open wound     toes   • Orthostatic hypotension    • Peripheral vascular disease (HCC)    • Schizophrenia ([facility])    • Weakness      Past Surgical History:   Procedure Laterality Date   • AMPUTATION DIGIT Bilateral 3/3/2021    Procedure: AMPUTATION 2ND TOE RIGHT SIDE/ PARTIAL AMPUTATION 1ST TOE ON LEFT;  Surgeon: Will Garcia DPM;  Location: [facility];  Service: Podiatry;  Laterality: Bilateral;   • AMPUTATION DIGIT Right 8/13/2021    Procedure: AMPUTATION OF THIRD TOE;  Surgeon: Will Garcia DPM;  Location: [facility] OR;  Service: Podiatry;  Laterality: Right;   • AMPUTATION DIGIT Right 1/12/2022  "   Procedure: RIGHT 4TH TOE/METATARSAL AMPUTATION DIGIT;  Surgeon: Will Garcia DPM;  Location: [facility] MAIN OR;  Service: Podiatry;  Laterality: Right;   • AMPUTATION FOOT Right 7/17/2020    Procedure: RIGHT GREAT TOE AMPUTATION;  Surgeon: Will Garcia DPM;  Location: [facility] MAIN OR;  Service: Podiatry;  Laterality: Right;   • CARDIAC CATHETERIZATION     • CORONARY ANGIOPLASTY       /71   Pulse 68   Ht 172.7 cm (67.99\")   Wt 82.1 kg (181 lb)   SpO2 98%   BMI 27.53 kg/m²   Family History   Adopted: Yes       Current Outpatient Medications:   •  amLODIPine (NORVASC) 5 MG tablet, Take 1 tablet by mouth Daily., Disp: , Rfl:   •  atorvastatin (LIPITOR) 80 MG tablet, Take 1 tablet by mouth Every Night., Disp: , Rfl:   •  cetirizine (zyrTEC) 10 MG tablet, Take 1 tablet by mouth Every Night., Disp: , Rfl:   •  clopidogrel (PLAVIX) 75 MG tablet, Take 1 tablet by mouth Daily., Disp: , Rfl:   •  dicyclomine (BENTYL) 10 MG capsule, Take 1 capsule by mouth Every 6 (Six) Hours As Needed., Disp: , Rfl:   •  diphenhydrAMINE (BENADRYL) 25 mg capsule, Take 1 capsule by mouth Every 8 (Eight) Hours As Needed for Itching. Not dos, Disp: , Rfl:   •  fenofibrate (TRICOR) 145 MG tablet, Take 1 tablet by mouth Daily., Disp: , Rfl:   •  hydrALAZINE (APRESOLINE) 50 MG tablet, Take 1 tablet by mouth 3 (Three) Times a Day., Disp: , Rfl:   •  HYDROcodone-acetaminophen (Norco)  MG per tablet, Take 1 tablet by mouth Every 6 (Six) Hours As Needed for Moderate Pain ., Disp: 4 tablet, Rfl: 0  •  loperamide (IMODIUM) 2 MG capsule, Take 1 capsule by mouth Every 4 (Four) Hours As Needed for Diarrhea., Disp: , Rfl:   •  magnesium oxide (MAG-OX) 400 tablet tablet, Take 1 tablet by mouth 2 (Two) Times a Day., Disp: , Rfl:   •  metFORMIN (GLUCOPHAGE) 500 MG tablet, Take 1 tablet by mouth 2 (Two) Times a Day With Meals., Disp: , Rfl:   •  metoprolol succinate XL (TOPROL-XL) 25 MG 24 hr tablet, Take 1 tablet by mouth Daily. Take " dos, Disp: , Rfl:   •  nitroglycerin (NITROSTAT) 0.4 MG SL tablet, Place 1 tablet under the tongue Every 5 (Five) Minutes As Needed for Chest Pain. Take no more than 3 doses in 15 minutes., Disp: , Rfl:   •  OXcarbazepine (TRILEPTAL) 300 MG tablet, Take 150 mg by mouth Every Night., Disp: , Rfl:   •  OXcarbazepine (TRILEPTAL) 300 MG tablet, Take 1 tablet by mouth 3 (Three) Times a Day., Disp: , Rfl:   •  pantoprazole (PROTONIX) 40 MG EC tablet, Take 1 tablet by mouth Daily., Disp: , Rfl:   •  pregabalin (LYRICA) 150 MG capsule, Take 1 capsule by mouth Every 8 (Eight) Hours., Disp: , Rfl:   •  promethazine (PHENERGAN) 12.5 MG tablet, Take 2 tablets by mouth Every 6 (Six) Hours As Needed for Nausea or Vomiting., Disp: , Rfl:   •  risperiDONE (risperDAL) 0.25 MG tablet, Take 1 tablet by mouth 2 (Two) Times a Day., Disp: , Rfl:   •  saccharomyces boulardii (Florastor) 250 MG capsule, Take 1 capsule by mouth 2 (Two) Times a Day., Disp: 30 capsule, Rfl: 1  •  simethicone (MYLICON) 80 MG chewable tablet, Chew 1 tablet Every 8 (Eight) Hours As Needed for Flatulence., Disp: , Rfl:   •  sodium chloride 1 g tablet, Take 1 tablet by mouth 3 (Three) Times a Day., Disp: , Rfl:   •  traZODone (DESYREL) 50 MG tablet, Take 2 tablets by mouth Every Night., Disp: , Rfl:   •  vitamin D3 125 MCG (5000 UT) capsule capsule, Take 1 capsule by mouth Daily., Disp: , Rfl:   No Known Allergies  Social History     Socioeconomic History   • Marital status: Single   Tobacco Use   • Smoking status: Former     Types: Cigarettes     Quit date: 2016     Years since quittin.2   • Smokeless tobacco: Never   Vaping Use   • Vaping Use: Never used   Substance and Sexual Activity   • Alcohol use: Never   • Drug use: Never   • Sexual activity: Defer     Review of Systems   Constitutional: Negative for malaise/fatigue.   Cardiovascular: Negative for chest pain, dyspnea on exertion, leg swelling and palpitations.   Respiratory: Negative for cough and  shortness of breath.    Gastrointestinal: Negative for abdominal pain, nausea and vomiting.   Neurological: Negative for dizziness, focal weakness, headaches, light-headedness and numbness.   All other systems reviewed and are negative.             Objective:     Constitutional:       Appearance: Well-developed.   Eyes:      General: No scleral icterus.     Conjunctiva/sclera: Conjunctivae normal.   HENT:      Head: Normocephalic and atraumatic.   Neck:      Vascular: No carotid bruit or JVD.   Pulmonary:      Effort: Pulmonary effort is normal.      Breath sounds: Normal breath sounds. No wheezing. No rales.   Cardiovascular:      Normal rate. Regular rhythm.   Pulses:     Intact distal pulses.   Abdominal:      General: Bowel sounds are normal.      Palpations: Abdomen is soft.   Musculoskeletal:      Cervical back: Normal range of motion and neck supple. Skin:     General: Skin is warm and dry.      Findings: No rash.   Neurological:      Mental Status: Alert.       Procedures    Lab Review:         [facility]  1.  Coronary disease  Patient had nonobstructive disease in the past with normal function is currently stable on medications  2.  Hypertension  Patient blood pressure currently stable on metoprolol hydralazine and amlodipine  3.  Hyperlipidemia  Maylin on atorvastatin the lipids are well within normal limits  4.  Diabetes  Patient is on oral medicines and followed by the primary care doctor  5.  Peripheral artery disease patient has severe peripheral vascular disease and is followed by the primary care doctor      Patient's previous medical records, labs, and EKG were reviewed and discussed with the patient at today's visit.

## (undated) DEVICE — PK EXTREM 50

## (undated) DEVICE — 1010 S-DRAPE TOWEL DRAPE 10/BX: Brand: STERI-DRAPE™

## (undated) DEVICE — SOL IRRIG NACL 9PCT 1000ML BTL

## (undated) DEVICE — KT SURG TURNOVER 050

## (undated) DEVICE — WET SKIN PREP TRAY: Brand: MEDLINE INDUSTRIES, INC.

## (undated) DEVICE — BNDG ELAS ELITE V/CLOSE 4IN 5YD LF STRL

## (undated) DEVICE — BNDG ESMARK 4IN 12FT LF STRL BLU

## (undated) DEVICE — GOWN,REINFORCE,POLY,SIRUS,BREATH SLV,XLG: Brand: MEDLINE

## (undated) DEVICE — SPNG LAP PREWSH SFTPK 18X18IN STRL PK/5

## (undated) DEVICE — DRSNG WND GZ CURAD OIL EMULSION 3X3IN STRL

## (undated) DEVICE — SUT ETHLN 3/0 PS1 18IN 1663H

## (undated) DEVICE — PAD,ABDOMINAL,5"X9",STERILE,LF,1/PK: Brand: MEDLINE INDUSTRIES, INC.

## (undated) DEVICE — MICRO SAGITTAL BLADE (9.5 X 0.4 X 25.5MM)

## (undated) DEVICE — SUT ETHLN 4/0 FS2 18IN 662H

## (undated) DEVICE — CUFF TOURNI 1BLADDER 1PRT 12IN STRL

## (undated) DEVICE — GLV SURG SIGNATURE ESSENTIAL PF LTX SZ7

## (undated) DEVICE — DRAPE,T,LIMB,BILATERAL,STERILE: Brand: MEDLINE

## (undated) DEVICE — BNDG GZ SOF-FORM CONFRM 3X75IN LF STRL

## (undated) DEVICE — GAUZE,SPONGE,FLUFF,6"X6.75",STRL,5/TRAY: Brand: MEDLINE

## (undated) DEVICE — SPNG GZ WOVN 4X4IN 12PLY 10/BX STRL

## (undated) DEVICE — SUT VIC 3/0 CT2 27IN J232H

## (undated) DEVICE — BNDG ELAS ELITE V/CLOSE 3IN 5YD LF STRL

## (undated) DEVICE — CONTAINER,SPECIMEN,OR STERILE,4OZ: Brand: MEDLINE

## (undated) DEVICE — UNDERGLV SURG BIOGEL INDICAT PF 71/2 GRN

## (undated) DEVICE — 9165 UNIVERSAL PATIENT PLATE: Brand: 3M™

## (undated) DEVICE — BANDAGE,GAUZE,BULKEE II,4.5"X4.1YD,STRL: Brand: MEDLINE

## (undated) DEVICE — SHOE, POST-OPERATIVE: Brand: DEROYAL

## (undated) DEVICE — BNDG ELAS CO-FLEX SLF ADHR 4IN5YD LF STRL

## (undated) DEVICE — SOL IRRIG NACL 1000ML

## (undated) DEVICE — UNDYED BRAIDED (POLYGLACTIN 910), SYNTHETIC ABSORBABLE SUTURE: Brand: COATED VICRYL

## (undated) DEVICE — SOLUTION,WATER,IRRIGATION,1000ML,STERILE: Brand: MEDLINE

## (undated) DEVICE — BLD SCLPL BEAVR MINI STR 2BVL 180D LF

## (undated) DEVICE — ESWAB LQ AMIES  REG. NYLON FLOCKED  APPLICATOR: Brand: ESWAB™

## (undated) DEVICE — PAD UNDERCAST WYTEX 4IN 4YD LF STRL

## (undated) DEVICE — SOL IRRIG H2O 1000ML STRL

## (undated) DEVICE — GLV SURG SENSICARE PI MIC PF SZ5.5 LF STRL

## (undated) DEVICE — INTENDED FOR TISSUE SEPARATION, AND OTHER PROCEDURES THAT REQUIRE A SHARP SURGICAL BLADE TO PUNCTURE OR CUT.: Brand: BARD-PARKER ® CARBON RIB-BACK BLADES

## (undated) DEVICE — CUFF SCD HEMOFORCE SEQ CALF STD MD

## (undated) DEVICE — SUT VIC 3/0 SH 27IN J416H

## (undated) DEVICE — PENCL EVAC ULTRAVAC SMOKE W/BLD

## (undated) DEVICE — PENCL HND ROCKRSWTCH HOLSTR EZ CLEAN TP CRD 10FT

## (undated) DEVICE — SUT ETHLN 3/0 FS1 30IN 669H

## (undated) DEVICE — PK PROC TURNOVER